# Patient Record
Sex: MALE | Race: WHITE | Employment: OTHER | URBAN - METROPOLITAN AREA
[De-identification: names, ages, dates, MRNs, and addresses within clinical notes are randomized per-mention and may not be internally consistent; named-entity substitution may affect disease eponyms.]

---

## 2017-01-04 ENCOUNTER — TELEPHONE (OUTPATIENT)
Dept: INTERVENTIONAL RADIOLOGY/VASCULAR | Facility: CLINIC | Age: 67
End: 2017-01-04

## 2017-01-05 ENCOUNTER — APPOINTMENT (OUTPATIENT)
Dept: INTERVENTIONAL RADIOLOGY/VASCULAR | Facility: CLINIC | Age: 67
End: 2017-01-05
Attending: RADIOLOGY
Payer: MEDICARE

## 2017-01-05 ENCOUNTER — APPOINTMENT (OUTPATIENT)
Dept: MEDSURG UNIT | Facility: CLINIC | Age: 67
End: 2017-01-05
Attending: RADIOLOGY
Payer: MEDICARE

## 2017-01-05 PROCEDURE — C1887 CATHETER, GUIDING: HCPCS

## 2017-01-05 PROCEDURE — C1874 STENT, COATED/COV W/DEL SYS: HCPCS

## 2017-01-05 PROCEDURE — 75710 ARTERY X-RAYS ARM/LEG: CPT | Mod: LT

## 2017-01-05 PROCEDURE — 37221 ZZHC REVASC ILIAC ART, ANGIO/STENT, INIT VESSEL: CPT

## 2017-01-05 PROCEDURE — C1769 GUIDE WIRE: HCPCS

## 2017-01-05 PROCEDURE — C1725 CATH, TRANSLUMIN NON-LASER: HCPCS

## 2017-01-16 ENCOUNTER — TRANSFERRED RECORDS (OUTPATIENT)
Dept: HEALTH INFORMATION MANAGEMENT | Facility: CLINIC | Age: 67
End: 2017-01-16

## 2017-01-17 ENCOUNTER — TELEPHONE (OUTPATIENT)
Dept: INTERVENTIONAL RADIOLOGY/VASCULAR | Facility: CLINIC | Age: 67
End: 2017-01-17

## 2017-01-17 ENCOUNTER — TELEPHONE (OUTPATIENT)
Dept: FAMILY MEDICINE | Facility: CLINIC | Age: 67
End: 2017-01-17

## 2017-01-17 DIAGNOSIS — I70.213 ATHEROSCLEROSIS OF BOTH LOWER EXTREMITIES WITH INTERMITTENT CLAUDICATION (H): ICD-10-CM

## 2017-01-17 DIAGNOSIS — I73.9 PAD (PERIPHERAL ARTERY DISEASE) (H): Primary | ICD-10-CM

## 2017-01-17 NOTE — TELEPHONE ENCOUNTER
I called to get patient scheduled for 1 month post stent placement bilateral common and external iliac arteries imaging and follow up visit with Dr. Hampton.  Pt confirms that he is available 2/1/17 and is scheduled as follows ( a letter was sent with details).  Appointment Reminder:      Your ultrasounds have been scheduled for Wednesday, February 1st @ 12:30 pm.     Please arrive 15 minutes early.     Allow 90 minutes for this exam.    Nothing to eat for 8 hours prior to imaging.    To check in for this test go to the [facility] building located at [address] [place] [address]. Imaging will be completed on the first floor.    Your clinic appointment with Dr. Hampton will be on Wednesday, February 1st @ 2:00 pm, at the [facility], Vascular/Derm Clinic 3F.    DEISY Day, RN, BSN  Interventional Radiology Care Coordinator   Phone:  [phone]

## 2017-01-17 NOTE — Clinical Note
January 17, 2017    Joseph Cardona  [address] [address] [facility] [address]    Dear Joseph,     Appointment Reminder:      Your ultrasounds have been scheduled for Wednesday, February 1st @ 12:30 pm.     Please arrive 15 minutes early.     Allow 90 minutes for this exam.    Nothing to eat for 8 hours prior to imaging.    To check in for this test go to the [facility] building located at [address] [place] [address]. Imaging will be completed on the first floor.    Your clinic appointment with Dr. Hampton will be on Wednesday, February 1st @ 2:00 pm, at the [facility], Vascular/Derm Clinic 3F.    DEISY Day, RN, BSN  Interventional Radiology Care Coordinator   Phone:  [phone]

## 2017-02-01 ENCOUNTER — OFFICE VISIT (OUTPATIENT)
Dept: RADIOLOGY | Facility: CLINIC | Age: 67
End: 2017-02-01

## 2017-02-01 VITALS — DIASTOLIC BLOOD PRESSURE: 70 MMHG | HEART RATE: 77 BPM | SYSTOLIC BLOOD PRESSURE: 149 MMHG

## 2017-02-01 DIAGNOSIS — I70.213 ATHEROSCLEROSIS OF NATIVE ARTERY OF BOTH LOWER EXTREMITIES WITH INTERMITTENT CLAUDICATION (H): Primary | ICD-10-CM

## 2017-02-01 ASSESSMENT — PAIN SCALES - GENERAL: PAINLEVEL: NO PAIN (0)

## 2017-02-01 NOTE — Clinical Note
2/1/2017       RE: Joseph Cardona  [address] [address] [facility] [place] [address]     Dear Colleague,    Thank you for referring your patient, Joseph Cardona, to the [facility] VASCULAR [facility]INIC at [facility]. Please see a copy of my visit note below.        INTERVENTIONAL RADIOLOGY CONSULTATION    Name: Joseph Cardona  Age: 66 year old   Referring Physician: Dr. López   REASON FOR Followup: Post procedure followup    HPI:   Mr Cardona is a 66 year old gentleman, status placement of overlapping bare metal stents in the right iliac arteries (8 mm Zilver bare metal stents in the right common iliac artery and right external iliac artery) and overlapping stent grafts in the left iliac arteries (10 mm iCast stent graft in left common iliac artery, and 8 mm Viabhan stent graft in the left external iliac artery) on 1/5/2017 for bilateral lower extremity claudication. Clinically, he reports significant improvement in his claudication symptoms. He has been able to walk without life limiting claudications and he is very happy with the results of the procedure. He denies any new leg ulcers, palpable mass or hematoma in the groin access site, fever, chills, or any other medical issues. He has been compliant with his antiplatelet medications (ASA and Clopidegrol).         PAST MEDICAL HISTORY:   Past Medical History   Diagnosis Date     Heart disease      Hypertension        PAST SURGICAL HISTORY:   Past Surgical History   Procedure Laterality Date     Eye surgery       cataract surgery     Cardiac surgery       Bypass double and quadruple     Head & neck surgery       Caratid surgery       FAMILY HISTORY:   No family history on file.    SOCIAL HISTORY:   Social History   Substance Use Topics     Smoking status: Former Smoker     Smokeless tobacco: Never Used      Comment: quit 2 years ago     Alcohol Use: 0.0 oz/week     0 Standard drinks or equivalent per week       PROBLEM LIST:    Patient Active Problem List    Diagnosis Date Noted     Intermittent claudication (H) 11/09/2016     Priority: Medium     Post herpetic neuralgia 11/09/2016     Priority: Medium     Positive hepatitis C antibody test 06/10/2016     Priority: Medium     Coronary artery disease involving native coronary artery of native heart without angina pectoris 06/09/2016     Priority: Medium     COPD, mild (H) 02/09/2016     Priority: Medium     Tinea pedis of left foot 02/09/2016     Priority: Medium     Hyperlipidemia LDL goal <70 08/14/2015     Priority: Medium     Problem list name updated by automated process. Provider to review       Essential hypertension 08/14/2015     Priority: Medium     Tobacco dependence syndrome 08/14/2015     Priority: Medium     Paroxysmal atrial fibrillation (H) 08/14/2015     Priority: Medium     Advanced directives, counseling/discussion 11/17/2015     Patient dont have HCD, declined to discuss at this time  Funmi King MA           MEDICATIONS:   Prescription Medications as of 2/1/2017             gabapentin (NEURONTIN) 300 MG capsule TAKE 2 CAPSULES (600 MG) BY MOUTH 3 TIMES DAILY    albuterol (VENTOLIN HFA) 108 (90 BASE) MCG/ACT inhaler Inhale 2 puffs into the lungs every 4 hours as needed for shortness of breath / dyspnea or wheezing    losartan (COZAAR) 50 MG tablet Take 1 tablet (50 mg) by mouth daily    carvedilol (COREG) 6.25 MG tablet Take 1 tablet (6.25 mg) by mouth 2 times daily (with meals)    pantoprazole (PROTONIX) 20 MG tablet Take 1 capsule 30 minutes before breakfast.    zolpidem (AMBIEN) 5 MG tablet Take 1 tablet (5 mg) by mouth nightly as needed for sleep    tiotropium (SPIRIVA HANDIHALER) 18 MCG inhalation capsule Inhale 1 capsule (18 mcg) into the lungs daily    lidocaine (LIDODERM) 5 % patch Place 1 patch onto the skin every 24 hours    FUROSEMIDE PO Take 20 mg by mouth daily     nitroglycerin (NITROSTAT) 0.4 MG SL tablet Place 1 tablet (0.4 mg) under the tongue every  5 minutes as needed for chest pain As needed for chest pain    aspirin 81 MG chewable tablet 81 mg daily    atorvastatin (LIPITOR) 40 MG tablet Take 40 mg by mouth daily    clopidogrel (PLAVIX) 75 MG tablet Take 75 mg by mouth daily          ALLERGIES:   Review of patient's allergies indicates no known allergies.    ROS:  Skin: negative  Cardiovascular: negative  Gastrointestinal: negative  Genitourinary: negative  Musculoskeletal: negative  Neurologic: negative  Psychiatric: negative      Physical Examination:   VITALS:   There were no vitals taken for this visit.  Constitutional: healthy, alert and no distress  Head: Normocephalic.   Musculoskeletal: extremities normal- no gross deformities noted  Skin: no suspicious lesions or rashes  Psychiatric: mentation appears normal and mentation appears normal.  Vascular: No bruits are noted. No hematoma or mass in the groin,.   DP  PT    Left  1/2 1/2    Right  2/2 2/2        Labs:    BMP RESULTS:  Lab Results   Component Value Date     11/08/2016    POTASSIUM 4.4 11/08/2016    CHLORIDE 107 11/08/2016    CO2 26 11/08/2016    ANIONGAP 8 11/08/2016    * 11/08/2016    BUN 22 11/08/2016    CR 0.98 01/05/2017    GFRESTIMATED 76 01/05/2017    GFRESTBLACK >90   GFR Calc   01/05/2017    MARIBEL 8.2* 11/08/2016        CBC RESULTS:  Lab Results   Component Value Date    WBC 7.6 01/05/2017    RBC 4.66 01/05/2017    HGB 14.4 01/05/2017    HCT 42.9 01/05/2017    MCV 92 01/05/2017    MCH 30.9 01/05/2017    MCHC 33.6 01/05/2017    RDW 12.2 01/05/2017     01/05/2017       INR/PTT:  Lab Results   Component Value Date    INR 1.29* 01/05/2017    PTT 31 01/05/2017       Diagnostic studies: ABIs and Doppler US of 2/1/2017 was reviewed,    Assessment/ Plan:  Mr Cardona is a 66 year old gentleman, status placement of overlapping bare metal stents in the right iliac arteries (8 mm Zilver bare metal stents in the right common iliac artery and right external  iliac artery) and overlapping stent grafts in the left iliac arteries (10 mm iCast stent graft in left common iliac artery, and 8 mm Viabhan stent graft in the left external iliac artery) on 1/5/2017 for bilateral lower extremity claudication. Clinically, he reports significant improvement in his claudication symptoms. On Doppler US today, the stents are patent. His ABIs has not significantly changed since before the procedure. This could be potentially due to improvement in the inflow of the bilateral lower extremity arteries. His left fem-pop occlusion is not symptomatic at this stage. For now, we would continue to monitor him with a repeat PIOTR and Doppler US of the pelvic arteries in 6 months to ascertain patency of the stents. From IR prospective, we would like him to continue Plavix for at least 3 months. He is concerned about driving in the rush hour and prefers to have his tests be done in [facility] clinic. We could arrange his tests to be done in [facility] and if there is any concerns about the patency of the stents or if the left fem-pop occlusive disease becomes symptomatic, we are happy to see him again in the [facility] IR clinic.    I saw and examined the patient with the fellow Dr. Jordan and agree with the assessment and plan. Clinical success after iliac stenting with resolution of lifestyle limiting claudication. Would recommend continued antiplatelet treatment with high dose ASA or plavix. Recommened surveillance at 6 months. Given clinical improvement no current need to attempt left SFA revascularization.     Total of approximately 25 minutes spent with the patient of which >50% spent on couselling.    Sincerely,    Bo Hampton MD and Viri Jordan MD  Interventional Radiology Clinic  [facility]    CC  Patient Care Team:  Kyle López MD as PCP - General (Internal Medicine)

## 2017-02-01 NOTE — Clinical Note
February 2, 2017    Joseph Cardona  [address] [address] [facility] [address]    Dear Joseph,     Appointment Reminder:      Your 6 month interval PIOTR and iliac duplex ultrasounds have been scheduled for Monday, August 7th starting at 8:00 am at [place].   Located at [address] [place] [id number]    Nothing to eat 8 hours prior to imaging    No caffiene or tobacco 1 hour prior to imaging    Please arrive 15 minutes early.     Allow 80 minutes for these exams.    My plan is to have Dr. Hampton review your imaging and call you with the results.      Please feel free to call me with any questions or concerns as this date approaches.    Sincerely,    Cheri Day RN, BSN  Interventional Radiology Care Coordinator  Office: [phone]

## 2017-02-01 NOTE — NURSING NOTE
"Chief Complaint   Patient presents with     Vascular Disease     Follow up for posterior iliac artery stent, \"things are going pretty well.\"       Filed Vitals:    02/01/17 1400   BP: 149/70   Pulse: 77       There is no weight on file to calculate BMI.                              Irma Paulino, [facility]    "

## 2017-02-01 NOTE — PROGRESS NOTES
INTERVENTIONAL RADIOLOGY CONSULTATION    Name: Joseph Cardona  Age: 66 year old   Referring Physician: Dr. López   REASON FOR Followup: Post procedure followup    HPI:   Mr Cardona is a 66 year old gentleman, status placement of overlapping bare metal stents in the right iliac arteries (8 mm Zilver bare metal stents in the right common iliac artery and right external iliac artery) and overlapping stent grafts in the left iliac arteries (10 mm iCast stent graft in left common iliac artery, and 8 mm Viabhan stent graft in the left external iliac artery) on 1/5/2017 for bilateral lower extremity claudication. Clinically, he reports significant improvement in his claudication symptoms. He has been able to walk without life limiting claudications and he is very happy with the results of the procedure. He denies any new leg ulcers, palpable mass or hematoma in the groin access site, fever, chills, or any other medical issues. He has been compliant with his antiplatelet medications (ASA and Clopidegrol).         PAST MEDICAL HISTORY:   Past Medical History   Diagnosis Date     Heart disease      Hypertension        PAST SURGICAL HISTORY:   Past Surgical History   Procedure Laterality Date     Eye surgery       cataract surgery     Cardiac surgery       Bypass double and quadruple     Head & neck surgery       Caratid surgery       FAMILY HISTORY:   No family history on file.    SOCIAL HISTORY:   Social History   Substance Use Topics     Smoking status: Former Smoker     Smokeless tobacco: Never Used      Comment: quit 2 years ago     Alcohol Use: 0.0 oz/week     0 Standard drinks or equivalent per week       PROBLEM LIST:   Patient Active Problem List    Diagnosis Date Noted     Intermittent claudication (H) 11/09/2016     Priority: Medium     Post herpetic neuralgia 11/09/2016     Priority: Medium     Positive hepatitis C antibody test 06/10/2016     Priority: Medium     Coronary artery disease involving native  coronary artery of native heart without angina pectoris 06/09/2016     Priority: Medium     COPD, mild (H) 02/09/2016     Priority: Medium     Tinea pedis of left foot 02/09/2016     Priority: Medium     Hyperlipidemia LDL goal <70 08/14/2015     Priority: Medium     Problem list name updated by automated process. Provider to review       Essential hypertension 08/14/2015     Priority: Medium     Tobacco dependence syndrome 08/14/2015     Priority: Medium     Paroxysmal atrial fibrillation (H) 08/14/2015     Priority: Medium     Advanced directives, counseling/discussion 11/17/2015     Patient dont have HCD, declined to discuss at this time  Funmi King MA           MEDICATIONS:   Prescription Medications as of 2/1/2017             gabapentin (NEURONTIN) 300 MG capsule TAKE 2 CAPSULES (600 MG) BY MOUTH 3 TIMES DAILY    albuterol (VENTOLIN HFA) 108 (90 BASE) MCG/ACT inhaler Inhale 2 puffs into the lungs every 4 hours as needed for shortness of breath / dyspnea or wheezing    losartan (COZAAR) 50 MG tablet Take 1 tablet (50 mg) by mouth daily    carvedilol (COREG) 6.25 MG tablet Take 1 tablet (6.25 mg) by mouth 2 times daily (with meals)    pantoprazole (PROTONIX) 20 MG tablet Take 1 capsule 30 minutes before breakfast.    zolpidem (AMBIEN) 5 MG tablet Take 1 tablet (5 mg) by mouth nightly as needed for sleep    tiotropium (SPIRIVA HANDIHALER) 18 MCG inhalation capsule Inhale 1 capsule (18 mcg) into the lungs daily    lidocaine (LIDODERM) 5 % patch Place 1 patch onto the skin every 24 hours    FUROSEMIDE PO Take 20 mg by mouth daily     nitroglycerin (NITROSTAT) 0.4 MG SL tablet Place 1 tablet (0.4 mg) under the tongue every 5 minutes as needed for chest pain As needed for chest pain    aspirin 81 MG chewable tablet 81 mg daily    atorvastatin (LIPITOR) 40 MG tablet Take 40 mg by mouth daily    clopidogrel (PLAVIX) 75 MG tablet Take 75 mg by mouth daily          ALLERGIES:   Review of patient's allergies indicates  no known allergies.    ROS:  Skin: negative  Cardiovascular: negative  Gastrointestinal: negative  Genitourinary: negative  Musculoskeletal: negative  Neurologic: negative  Psychiatric: negative      Physical Examination:   VITALS:   There were no vitals taken for this visit.  Constitutional: healthy, alert and no distress  Head: Normocephalic.   Musculoskeletal: extremities normal- no gross deformities noted  Skin: no suspicious lesions or rashes  Psychiatric: mentation appears normal and mentation appears normal.  Vascular: No bruits are noted. No hematoma or mass in the groin,.   DP  PT    Left  1/2  1/2    Right  2/2  2/2        Labs:    BMP RESULTS:  Lab Results   Component Value Date     11/08/2016    POTASSIUM 4.4 11/08/2016    CHLORIDE 107 11/08/2016    CO2 26 11/08/2016    ANIONGAP 8 11/08/2016    * 11/08/2016    BUN 22 11/08/2016    CR 0.98 01/05/2017    GFRESTIMATED 76 01/05/2017    GFRESTBLACK >90   GFR Calc   01/05/2017    MARIBEL 8.2* 11/08/2016        CBC RESULTS:  Lab Results   Component Value Date    WBC 7.6 01/05/2017    RBC 4.66 01/05/2017    HGB 14.4 01/05/2017    HCT 42.9 01/05/2017    MCV 92 01/05/2017    MCH 30.9 01/05/2017    MCHC 33.6 01/05/2017    RDW 12.2 01/05/2017     01/05/2017       INR/PTT:  Lab Results   Component Value Date    INR 1.29* 01/05/2017    PTT 31 01/05/2017       Diagnostic studies: ABIs and Doppler US of 2/1/2017 was reviewed,    Assessment/ Plan:  Mr Cardona is a 66 year old gentleman, status placement of overlapping bare metal stents in the right iliac arteries (8 mm Zilver bare metal stents in the right common iliac artery and right external iliac artery) and overlapping stent grafts in the left iliac arteries (10 mm iCast stent graft in left common iliac artery, and 8 mm Viabhan stent graft in the left external iliac artery) on 1/5/2017 for bilateral lower extremity claudication. Clinically, he reports significant improvement in his  claudication symptoms. On Doppler US today, the stents are patent. His ABIs has not significantly changed since before the procedure. This could be potentially due to improvement in the inflow of the bilateral lower extremity arteries. His left fem-pop occlusion is not symptomatic at this stage. For now, we would continue to monitor him with a repeat PIOTR and Doppler US of the pelvic arteries in 6 months to ascertain patency of the stents. From IR prospective, we would like him to continue Plavix for at least 3 months. He is concerned about driving in the rush hour and prefers to have his tests be done in [facility] clinic. We could arrange his tests to be done in [facility] and if there is any concerns about the patency of the stents or if the left fem-pop occlusive disease becomes symptomatic, we are happy to see him again in the [facility] IR clinic.    I saw and examined the patient with the fellow Dr. Jordan and agree with the assessment and plan. Clinical success after iliac stenting with resolution of lifestyle limiting claudication. Would recommend continued antiplatelet treatment with high dose ASA or plavix. Recommened surveillance at 6 months. Given clinical improvement no current need to attempt left SFA revascularization.     Total of approximately 25 minutes spent with the patient of which >50% spent on couselling.    Sincerely,    Bo Hampton MD and Viri Jordan MD  Interventional Radiology Clinic  [facility]    CC  Patient Care Team:  Erik López MD as PCP - General (Internal Medicine)  Erik López MD as MD (Internal Medicine)  Bo Hampton MD as Resident (Radiology)  ERIK LÓPEZ

## 2017-04-05 DIAGNOSIS — K21.9 GASTROESOPHAGEAL REFLUX DISEASE, ESOPHAGITIS PRESENCE NOT SPECIFIED: ICD-10-CM

## 2017-04-05 NOTE — TELEPHONE ENCOUNTER
pantoprazole (PROTONIX) 20 MG tablet      Last Written Prescription Date: 10/07/16  Last Fill Quantity: 90,  # refills: 1   Last Office Visit with [facility], [facility] or [facility] prescribing provider: 11/08/16      Sana Bejarano Radiology

## 2017-04-06 RX ORDER — PANTOPRAZOLE SODIUM 20 MG/1
TABLET, DELAYED RELEASE ORAL
Qty: 90 TABLET | Refills: 1 | Status: SHIPPED | OUTPATIENT
Start: 2017-04-06 | End: 2018-02-02

## 2017-04-06 NOTE — TELEPHONE ENCOUNTER
Prescription approved per [facility] Refill Protocol.    Jacquelyn Finn RN, [facility]

## 2017-04-07 DIAGNOSIS — G47.00 PERSISTENT INSOMNIA: ICD-10-CM

## 2017-04-07 NOTE — TELEPHONE ENCOUNTER
Ambien      Last Written Prescription Date: 09/15/16  Last Fill Quantity: 30,  # refills: 5   Last Office Visit with [facility], [facility] or [facility] prescribing provider: 11/08/16                                             Routing refill request to provider for review/approval because:  Drug not on the [facility] refill protocol   Kristen Olsen RN

## 2017-04-10 RX ORDER — ZOLPIDEM TARTRATE 5 MG/1
TABLET ORAL
Qty: 30 TABLET | Refills: 5 | Status: SHIPPED | OUTPATIENT
Start: 2017-04-10 | End: 2017-10-04

## 2017-04-27 ENCOUNTER — TELEPHONE (OUTPATIENT)
Dept: FAMILY MEDICINE | Facility: CLINIC | Age: 67
End: 2017-04-27

## 2017-04-27 NOTE — TELEPHONE ENCOUNTER
Panel Management Review          Fail List measure:       Patient is due/failing the following:   COLONOSCOPY    Action needed:   none    Type of outreach:    Sent letter.    Questions for provider review:    None                                                                                                                                    Tiffanie Champion MA

## 2017-04-27 NOTE — LETTER
[address] [address]  [phone]  Dept: [phone]      April 27, 2017      Joseph Cardona  [address] [facility]  [place] [address]    Dear Joseph Cardona,     At [facility] we care about your health and are committed to providing quality patient care.    Which includes staying current on preventive cancer screenings.  You can increase your chances of finding and treating cancers through regular screenings.      Our records indicate you may be due for the following preventive screening(s):    Colonoscopy    Colonoscopy is recommended every ten years for everyone age 50 and older. Please take a moment to read over the enclosed information packet about colon cancer screening. We strongly urge our patient's to consider having a colonoscopy done, which is the best screening test available and only needs to be done every 10 years if normal. If you are unwilling or unable to have a colonoscopy then we recommend the annual stool testing for blood. This test is called a FIT test and it looks for blood in the stool.     To schedule an appointment for your colonoscopy, please see the attached referral.     To schedule an appointment or discuss this screening further, you may contact us by phone at the [facility] at [phone] or online through the patient [email or web address]    If you have had any of the screenings listed above at another facility, please call us so that we may update your chart.      Your partners in health,      Quality Committee at [facility]

## 2017-05-08 ENCOUNTER — OFFICE VISIT (OUTPATIENT)
Dept: PHARMACY | Facility: CLINIC | Age: 67
End: 2017-05-08
Payer: COMMERCIAL

## 2017-05-08 DIAGNOSIS — J44.9 COPD, MILD (H): ICD-10-CM

## 2017-05-08 DIAGNOSIS — E78.5 HYPERLIPIDEMIA LDL GOAL <70: ICD-10-CM

## 2017-05-08 DIAGNOSIS — I10 ESSENTIAL HYPERTENSION: Primary | ICD-10-CM

## 2017-05-08 DIAGNOSIS — K21.9 GASTROESOPHAGEAL REFLUX DISEASE WITHOUT ESOPHAGITIS: ICD-10-CM

## 2017-05-08 DIAGNOSIS — G47.00 INSOMNIA, UNSPECIFIED TYPE: ICD-10-CM

## 2017-05-08 DIAGNOSIS — I25.810 ATHEROSCLEROSIS OF CORONARY ARTERY BYPASS GRAFT OF NATIVE HEART WITHOUT ANGINA PECTORIS: ICD-10-CM

## 2017-05-08 PROCEDURE — 99607 MTMS BY PHARM ADDL 15 MIN: CPT | Performed by: PHARMACIST

## 2017-05-08 PROCEDURE — 99605 MTMS BY PHARM NP 15 MIN: CPT | Performed by: PHARMACIST

## 2017-05-08 NOTE — PROGRESS NOTES
SUBJECTIVE/OBJECTIVE:                                                    Joseph Cardona is a 66 year old male coming in for a follow-up visit for Medication Therapy Management.  He was referred to me from  health insurance plan.     Chief Complaint: Follow-up from our appointment on 3/7/16. General medication review.    Allergies/ADRs: Reviewed in Epic  Tobacco: History of tobacco dependence - quit August 2015   Alcohol: not currently using  Caffeine: 1 cups/day of coffee  Activity: walks every day - couple blocks  PMH: Reviewed in Epic    Medication Adherence: no issues reported by patient    H/o MI/HTN: Currently taking losartan 50mg daily, carvedilol 6.25mg BID, furosemide 20mg daily, ASA 81mg daily and Plavix 75mg daily. Also has nitrostat 0.4mg SL on-hand for chest pain, though he hasn't needed to every use it (bottle is in-date). Denies abnormal bruising/bleeding or other side effects. [place]. Denies lightheadedness or orthostatic hypotension. Is complaining of significant urination with furosemide.    Hyperlipidemia: Current therapy includes atorvastatin 40mg once daily.  Pt reports no significant myalgias or other side effects.     COPD: Current medications: Tiotropium (Spiriva)  once daily and Albuterol used rarely  Pt is not experiencing side effects.   Pt reports the following symptoms: none.  Pt does not have an COPD Action Plan on file.   Has spirometry been completed: Yes     Insomnia: Current medications include: zolpidem 5mg 3-4 times per week. Pt reports trouble falling asleep. Denies sleep walking or sleep eating; also denies daytime grogginess.    GERD: Current medications include: Protonix (pantoprazole) 20mg daily. The patient does notice symptoms if they miss a dose. Patient feels that current regimen is effective, however is willing to decrease to avoid long term side effects.     Current labs include:  BP Readings from Last 3 Encounters:   05/08/17  136/50   02/01/17 149/70   01/05/17 137/54     Lab Results   Component Value Date    A1C 5.4 07/14/2016    A1C 5.6 02/09/2016     Recent Labs   Lab Test  11/08/16   1139 10/26/15  01/12/10   0757   CHOL  124   --   118   HDL  36*   --   33*   LDL  78  72  71   TRIG  50   --   68   CHOLHDLRATIO   --    --   3.6       Lab Results   Component Value Date    MICROL <5 07/14/2016     Last Basic Metabolic Panel:  Lab Results   Component Value Date     11/08/2016      Lab Results   Component Value Date    POTASSIUM 4.4 11/08/2016     Lab Results   Component Value Date    CHLORIDE 107 11/08/2016     Lab Results   Component Value Date    MARIBEL 8.2 11/08/2016     Lab Results   Component Value Date    CO2 26 11/08/2016     Lab Results   Component Value Date    BUN 22 11/08/2016     Lab Results   Component Value Date    CR 0.98 01/05/2017     Lab Results   Component Value Date     11/08/2016       GFR Estimate   Date Value Ref Range Status   01/05/2017 76 >60 mL/min/1.7m2 Final     Comment:     Non  GFR Calc   11/08/2016 77 >60 mL/min/1.7m2 Final     Comment:     Non  GFR Calc   02/09/2016 63 >60 mL/min/1.7m2 Final     Comment:     Non  GFR Calc     TSH   Date Value Ref Range Status   11/17/2015 4.71 (H) 0.40 - 4.00 mU/L Final   ]  T4 Free   Date Value Ref Range Status   11/17/2015 1.10 0.76 - 1.46 ng/dL Final   ]    Wt Readings from Last 2 Encounters:   01/05/17 180 lb (81.6 kg)   11/08/16 191 lb (86.6 kg)       Most Recent Immunizations   Administered Date(s) Administered     Influenza (High Dose) 3 valent vaccine 10/01/2016     Pneumococcal (PCV 13) 11/08/2016     Pneumococcal 23 valent 11/17/2015     TDAP Vaccine (Adacel) 11/17/2015     ASSESSMENT:                                                       Current medications were reviewed with him today. Medicare Part D topics discussed:Recommendations to doctor and Medication changes      Medication Adherence: no issues  identified    H/o MI/HTN: Consider discontinuation of furosemide, as he has been having frequent urination. There is room to increase the other BP medications if needed.     Hyperlipidemia: Stable. Pt is on high intensity statin which is indicated based on 2013 ACC/AHA guidelines for lipid management.      COPD: Stable.     Insomnia: Stable.    GERD: Would be beneficial to taper off PPI and start a PRN H2 blocker due to the potential for long-term side effects. Pt open to this.     PLAN:                                                      1. Take Protonix 20mg every other day until July, then discontinue. Start Zantac 150mg PRN BID for breakthrough heartburn.   2. Consider discontinuation of furosemide, can increase other BP medications if needed for BP.     I spent 30 minutes with this patient today. Extra 15 minutes spent creating a MAP. All changes were made via collaborative practice agreement with Kyle López. A copy of the visit note was provided to the patient's primary care provider.    Will follow up by phone in 1 month.    The patient was given a summary of these recommendations as an after visit summary.     Janell Harris, PharmD  Medication Therapy Management Pharmacist  Pager: [phone]

## 2017-05-08 NOTE — PATIENT INSTRUCTIONS
Recommendations from today's MTM visit:                                                      1. Cut back your pantoprazole (Protonix) to every other day until July, then stop. Start Zantac 150mg as needed (up to twice a day) for heartburn.     2. Talk to Dr. López about stopping the furosemide and increasing one of your other medications for your blood pressure.     Next MTM visit: 1 year, I'll call you in about a month to check in on your heartburn.     To schedule another MTM appointment, please call the clinic directly or you may call the MTM scheduling line at [phone] or toll-free at [phone].     My Clinical Pharmacist's contact information:                                                      It was a pleasure seeing you today!  Please feel free to contact me with any questions or concerns you have.      Janell Woodson, PharmD  Medication Therapy Management Pharmacist  Pager: [phone]    You may receive a survey about the MTM services you received.  I would appreciate your feedback to help me serve you better in the future. Please fill it out and return it when you can. Your comments will be anonymous.

## 2017-05-08 NOTE — MR AVS SNAPSHOT
After Visit Summary   5/8/2017    Joseph Cardona    MRN: [id number]           Patient Information     Date Of Birth          1950        Visit Information        Provider Department      5/8/2017 10:30 AM Janell Woodson [facility]        Care Instructions    Recommendations from today's MTM visit:                                                      1. Cut back your pantoprazole (Protonix) to every other day until July, then stop. Start Zantac 150mg as needed (up to twice a day) for heartburn.     2. Talk to Dr. López about stopping the furosemide and increasing one of your other medications for your blood pressure.     Next MTM visit: 1 year, I'll call you in about a month to check in on your heartburn.     To schedule another MT appointment, please call the clinic directly or you may call the MTM scheduling line at [phone] or toll-free at [phone].     My Clinical Pharmacist's contact information:                                                      It was a pleasure seeing you today!  Please feel free to contact me with any questions or concerns you have.      Janell Woodson, PharmD  Medication Therapy Management Pharmacist  Pager: [phone]    You may receive a survey about the MTM services you received.  I would appreciate your feedback to help me serve you better in the future. Please fill it out and return it when you can. Your comments will be anonymous.                Follow-ups after your visit        Your next 10 appointments already scheduled     Aug 07, 2017  8:00 AM CDT   US AORTA/IVC/ILIAC DUPLEX COMPLETE with MGUS1   [facility] ([facility])    [address] [address] [place] [id number] [phone]           Please bring a list of your medicines (including vitamins, minerals and over-the-counter drugs). Also, tell your doctor about any allergies you may have. Wear comfortable clothes  and leave your valuables at home.  Adults: No eating or drinking for 8 hours before the exam. You may take medicine with a small sip of water.  Children: - Children 6+ years: No food or drink for 6 hours before exam. - Children 1-5 years: No food or drink for 4 hours before exam. - Infants, breast-fed: may have breast milk up to 2 hours before exam. - Infants, formula: may have bottle until 4 hours before exam.  Please call the Imaging Department at your exam site with any questions.            Aug 07, 2017  8:40 AM CDT   US PIOTR DOPPLER NO EXERCISE with MGUS1, MG US TECH   [facility]    [address] [address] [place] [id number] [phone]           Please bring a list of your medicines (including vitamins, minerals and over-the-counter drugs). Also, tell your doctor about any allergies you may have. Wear comfortable clothes and leave your valuables at home.  No caffeine or tobacco for 1 hour prior to exam.  Please call the Imaging Department at your exam site with any questions.              Who to contact     If you have questions or need follow up information about today's clinic visit or your schedule please contact [facility] directly at [phone].  Normal or non-critical lab and imaging results will be communicated to you by [employer], letter or phone within 4 business days after the clinic has received the results. If you do not hear from us within 7 days, please contact the clinic through [employer] or phone. If you have a critical or abnormal lab result, we will notify you by phone as soon as possible.  Submit refill requests through [employer] or call your pharmacy and they will forward the refill request to us. Please allow 3 business days for your refill to be completed.          Additional Information About Your Visit        [employer] Information     [employer] lets you send messages to your doctor, view your test results, renew your  "prescriptions, schedule appointments and more. To sign up, go to [email or web address] . Click on \"Log in\" on the left side of the screen, which will take you to the Welcome page. Then click on \"Sign up Now\" on the right side of the page.     You will be asked to enter the access code listed below, as well as some personal information. Please follow the directions to create your username and password.     Your access code is: [email or web address]  Expires: 2017 10:44 AM     Your access code will  in 90 days. If you need help or a new code, please call your [facility] or [phone].        Care EveryWhere ID     This is your Care EveryWhere ID. This could be used by other organizations to access your [facility] medical records  [id number]         Blood Pressure from Last 3 Encounters:   17 149/70   17 137/54   16 156/78    Weight from Last 3 Encounters:   17 180 lb (81.6 kg)   16 191 lb (86.6 kg)   16 180 lb (81.6 kg)              Today, you had the following     No orders found for display       Primary Care Provider Office Phone # Fax #    Kyle López [phone]       [facility] [address]  [facility] [address]        Thank you!     Thank you for choosing [facility]  for your care. Our goal is always to provide you with excellent care. Hearing back from our patients is one way we can continue to improve our services. Please take a few minutes to complete the written survey that you may receive in the mail after your visit with us. Thank you!             Your Updated Medication List - Protect others around you: Learn how to safely use, store and throw away your medicines at www.disposemymeds.org.          This list is accurate as of: 17 10:44 AM.  Always use your most recent med list.                   Brand Name Dispense Instructions for use    albuterol 108 (90 BASE) MCG/ACT Inhaler    " VENTOLIN HFA    1 Inhaler    Inhale 2 puffs into the lungs every 4 hours as needed for shortness of breath / dyspnea or wheezing       aspirin 81 MG chewable tablet      81 mg daily       atorvastatin 40 MG tablet    LIPITOR     Take 40 mg by mouth daily       carvedilol 6.25 MG tablet    COREG    180 tablet    Take 1 tablet (6.25 mg) by mouth 2 times daily (with meals)       clopidogrel 75 MG tablet    PLAVIX     Take 75 mg by mouth daily       FUROSEMIDE PO      Take 20 mg by mouth daily       lidocaine 5 % Patch    LIDODERM    30 patch    Place 1 patch onto the skin every 24 hours       losartan 50 MG tablet    COZAAR    90 tablet    Take 1 tablet (50 mg) by mouth daily       nitroglycerin 0.4 MG sublingual tablet    NITROSTAT    25 tablet    Place 1 tablet (0.4 mg) under the tongue every 5 minutes as needed for chest pain As needed for chest pain       pantoprazole 20 MG EC tablet    PROTONIX    90 tablet    TAKE 1 TABLET BY MOUTH DAILY 30 MINUTES BEFORE BREAKFAST.       tiotropium 18 MCG capsule    SPIRIVA HANDIHALER    30 capsule    Inhale 1 capsule (18 mcg) into the lungs daily       zolpidem 5 MG tablet    AMBIEN    30 tablet    TAKE 1 TABLET BY MOUTH NIGHTLY AS NEEDED FOR SLEEP.

## 2017-05-08 NOTE — LETTER
"     [facility] MTM     Date: 2017    Joseph Cardona  [address] [address] [address]    Dear Mr. Cardona,    Thank you for talking with me on 2017 about your health and medications. Medicare s MTM (Medication Therapy Management) program helps you understand your medications and use them safely.      This letter includes an action plan (Medication Action Plan) and medication list (Personal Medication List). The action plan has steps you should take to help you get the best results from your medications. The medication list will help you keep track of your medications and how to use them the right way.       Have your action plan and medication list with you when you talk with your doctors, pharmacists, and other healthcare providers in your care team.     Ask your doctors, pharmacists, and other healthcare providers to update the action plan and medication list at every visit.     Take your medication list with you if you go to the hospital or emergency room.     Give a copy of the action plan and medication list to your family or caregivers.     If you want to talk about this letter or any of the papers with it, please call   [phone].   We look forward to working with you, your doctors, and other healthcare providers to help you stay healthy.     Sincerely,    Janell Woodson      Enclosed: Medication Action Plan and Personal Medication List    MEDICATION ACTION PLAN FOR Joseph Cardona,  1950     This action plan will help you get the best results from your medications if you:   1. Read \"What we talked about.\"   2. Take the steps listed in the \"What I need to do\" boxes.   3. Fill in \"What I did and when I did it.\"   4. Fill in \"My follow-up plan\" and \"Questions I want to ask.\"     Have this action plan with you when you talk with your doctors, pharmacists, and other healthcare providers in your care team. Share this with your family or caregivers " too.  DATE PREPARED: 2017  What we talked about: Stopping pantoprazole due to concerns about long-term side effects.                                                   What I need to do: Take pantoprazole every other day until July, then stop it.        What I did and when I did it:                                              What we talked about: Starting Zantac (ranitidine) for breakthrough heartburn.                                                   What I need to do: Take ranitidine 150mg as needed (up to twice daily) for heartburn.        What I did and when I did it:                                               What we talked about: Potentially stopping furosemide.                                                   What I need to do: Talk to Dr. López about stopping this.        What I did and when I did it:                                                 My follow-up plan:                 Questions I want to ask:              If you have any questions about your action plan, call Janell Woodson, Phone: [phone] , Monday-Friday 8-4:30pm.           MEDICATION LIST FOR Joseph Cardona,  1950     This medication list was made for you after we talked. We also used information from your doctor's chart.      Use blank rows to add new medications. Then fill in the dates you started using them.    Cross out medications when you no longer use them. Then write the date and why you stopped using them.    Ask your doctors, pharmacists, and other healthcare providers to update this list at every visit. Keep this list up-to-date with:       Prescription medications    Over the counter drugs     Herbals    Vitamins    Minerals      If you go to the hospital or emergency room, take this list with you. Share this with your family or caregivers too.     DATE PREPARED: 2017  Allergies or side effects: Review of patient's allergies indicates no known allergies.     Medication:  ALBUTEROL SULFATE   (90 BASE) MCG/ACT IN AERS      How I use it:  Inhale 2 puffs into the lungs every 4 hours as needed for shortness of breath / dyspnea or wheezing      Why I use it: COPD, mild (H)    Prescriber:  Kyle López MD      Date I started using it:       Date I stopped using it:         Why I stopped using it:            Medication:  ASPIRIN 81 MG PO CHEW      How I use it:  81 mg daily      Why I use it:  history of heart attack    Prescriber:  Patient Reported      Date I started using it:       Date I stopped using it:         Why I stopped using it:            Medication:  ATORVASTATIN CALCIUM 40 MG PO TABS      How I use it:  Take 40 mg by mouth daily      Why I use it:  high cholesterol    Prescriber:  Patient Reported      Date I started using it:       Date I stopped using it:         Why I stopped using it:            Medication:  CARVEDILOL 6.25 MG PO TABS      How I use it:  Take 1 tablet (6.25 mg) by mouth 2 times daily (with meals)      Why I use it:  Essential hypertension with goal blood pressure less than 140/90    Prescriber:  Kyle López MD      Date I started using it:       Date I stopped using it:         Why I stopped using it:            Medication:  CLOPIDOGREL BISULFATE 75 MG PO TABS      How I use it:  Take 75 mg by mouth daily      Why I use it:  history of heart attack    Prescriber:  Patient Reported      Date I started using it:       Date I stopped using it:         Why I stopped using it:            Medication:  FUROSEMIDE PO      How I use it:  Take 20 mg by mouth daily       Why I use it:  blood pressure    Prescriber:  Patient Reported      Date I started using it:       Date I stopped using it:         Why I stopped using it:            Medication:  LOSARTAN POTASSIUM 50 MG PO TABS      How I use it:  Take 1 tablet (50 mg) by mouth daily      Why I use it: Essential hypertension with goal blood pressure less than 140/90    Prescriber:  Kyle López MD      Date I  started using it:       Date I stopped using it:         Why I stopped using it:            Medication:  NITROGLYCERIN 0.4 MG SL SUBL      How I use it:  Place 1 tablet (0.4 mg) under the tongue every 5 minutes as needed for chest pain As needed for chest pain      Why I use it: Coronary artery disease involving native coronary artery without angina pectoris, unspecified whether native or transplanted heart    Prescriber:  Kyle López MD      Date I started using it:       Date I stopped using it:         Why I stopped using it:            Medication:  PANTOPRAZOLE SODIUM 20 MG PO TBEC      How I use it:  TAKE 1 TABLET BY MOUTH DAILY 30 MINUTES BEFORE BREAKFAST.      Why I use it: Gastroesophageal reflux disease, esophagitis presence not specified    Prescriber:  Kyle López MD      Date I started using it:       Date I stopped using it:         Why I stopped using it:            Medication:  RANITIDINE  MG PO TABS      How I use it:  Take 1 tablet (150 mg) by mouth 2 times daily as needed for heartburn      Why I use it: Gastroesophageal reflux disease without esophagitis    Prescriber:  Kyle López MD      Date I started using it:       Date I stopped using it:         Why I stopped using it:            Medication:  TIOTROPIUM BROMIDE MONOHYDRATE 18 MCG IN CAPS      How I use it:  Inhale 1 capsule (18 mcg) into the lungs daily      Why I use it: Mild chronic obstructive pulmonary disease (H)    Prescriber:  Kyle López MD      Date I started using it:       Date I stopped using it:         Why I stopped using it:            Medication:  ZOLPIDEM TARTRATE 5 MG PO TABS      How I use it:  TAKE 1 TABLET BY MOUTH NIGHTLY AS NEEDED FOR SLEEP.      Why I use it: Persistent insomnia    Prescriber:  Kyle López MD      Date I started using it:       Date I stopped using it:         Why I stopped using it:            Medication:         How I use it:         Why I use it:       Prescriber:         Date I started using it:       Date I stopped using it:         Why I stopped using it:            Medication:         How I use it:         Why I use it:      Prescriber:         Date I started using it:       Date I stopped using it:         Why I stopped using it:            Medication:         How I use it:         Why I use it:      Prescriber:         Date I started using it:       Date I stopped using it:         Why I stopped using it:              Other Information:     If you have any questions about your action plan, call [phone].    According to the Paperwork Reduction Act of 1995, no persons are required to respond to a collection of information unless it displays a valid OMB control number. The valid OMB number for this information collection is 4069-7926. The time required to complete this information collection is estimated to average 40 minutes per response, including the time to review instructions, searching existing data resources, gather the data needed, and complete and review the information collection. If you have any comments concerning the accuracy of the time estimate(s) or suggestions for improving this form, please write to: CMS, Attn: CHANDAN Reports Clearance Officer, [address] [place] [phone].

## 2017-05-08 NOTE — Clinical Note
Blayne López,   I met with Joseph this week. He's having excessive urination with furosemide and would like to stop it. He's due for a visit with you, so I said you might want to talk with him about this at your appointment, otherwise I can call him.   Thanks, Janell Woodson, PharmD Medication Therapy Management Pharmacist Pager: [phone]

## 2017-05-11 VITALS — DIASTOLIC BLOOD PRESSURE: 50 MMHG | SYSTOLIC BLOOD PRESSURE: 136 MMHG

## 2017-06-09 ENCOUNTER — TELEPHONE (OUTPATIENT)
Dept: PHARMACY | Facility: CLINIC | Age: 67
End: 2017-06-09

## 2017-06-09 NOTE — TELEPHONE ENCOUNTER
Patient was in to see MTM visit on 5/8/17. It was decided during the visit to discontinue the pantoprazole through a taper, while also starting ranitidine 150 mg by mouth twice daily as needed because of long term side effects of pantoprazole.    Today called patient to check in on how the taper is going, and how the ranitidine is going. Patient had started the taper, and said that he has been experiencing heartburn at night 3 days of the week. The patient has not started ranitidine yet because he was not sure if he could take the pantoprazole and ranitidine together. Suggested that he start ranitidine as needed and he could do non-pharm options such as raising the foot of the bed and avoiding spicy or greasy meals before bed.    Lisa Garzon D4 Student  Ryann PatleD  Medication Therapy Management Pharmacist  Pager: [phone]

## 2017-06-26 ENCOUNTER — RADIANT APPOINTMENT (OUTPATIENT)
Dept: GENERAL RADIOLOGY | Facility: CLINIC | Age: 67
End: 2017-06-26
Attending: INTERNAL MEDICINE
Payer: COMMERCIAL

## 2017-06-26 ENCOUNTER — OFFICE VISIT (OUTPATIENT)
Dept: FAMILY MEDICINE | Facility: CLINIC | Age: 67
End: 2017-06-26
Payer: COMMERCIAL

## 2017-06-26 VITALS
HEIGHT: 70 IN | OXYGEN SATURATION: 95 % | DIASTOLIC BLOOD PRESSURE: 70 MMHG | TEMPERATURE: 97.3 F | BODY MASS INDEX: 26.7 KG/M2 | WEIGHT: 186.5 LBS | HEART RATE: 76 BPM | SYSTOLIC BLOOD PRESSURE: 135 MMHG

## 2017-06-26 DIAGNOSIS — R73.03 PREDIABETES: ICD-10-CM

## 2017-06-26 DIAGNOSIS — E78.5 HYPERLIPIDEMIA LDL GOAL <70: ICD-10-CM

## 2017-06-26 DIAGNOSIS — I10 HYPERTENSION GOAL BP (BLOOD PRESSURE) < 140/90: ICD-10-CM

## 2017-06-26 DIAGNOSIS — M76.61 ACHILLES TENDINITIS OF RIGHT LOWER EXTREMITY: Primary | ICD-10-CM

## 2017-06-26 DIAGNOSIS — I73.9 PERIPHERAL VASCULAR DISEASE (H): ICD-10-CM

## 2017-06-26 PROBLEM — Z95.828 S/P INSERTION OF ILIAC ARTERY STENT: Status: ACTIVE | Noted: 2017-06-26

## 2017-06-26 LAB
ALBUMIN SERPL-MCNC: 3.7 G/DL (ref 3.4–5)
ALP SERPL-CCNC: 73 U/L (ref 40–150)
ALT SERPL W P-5'-P-CCNC: 15 U/L (ref 0–70)
ANION GAP SERPL CALCULATED.3IONS-SCNC: 5 MMOL/L (ref 3–14)
AST SERPL W P-5'-P-CCNC: 12 U/L (ref 0–45)
BASOPHILS # BLD AUTO: 0.1 10E9/L (ref 0–0.2)
BASOPHILS NFR BLD AUTO: 0.6 %
BILIRUB SERPL-MCNC: 1.1 MG/DL (ref 0.2–1.3)
BUN SERPL-MCNC: 23 MG/DL (ref 7–30)
CALCIUM SERPL-MCNC: 9 MG/DL (ref 8.5–10.1)
CHLORIDE SERPL-SCNC: 106 MMOL/L (ref 94–109)
CHOLEST SERPL-MCNC: 109 MG/DL
CO2 SERPL-SCNC: 28 MMOL/L (ref 20–32)
CREAT SERPL-MCNC: 1.03 MG/DL (ref 0.66–1.25)
DIFFERENTIAL METHOD BLD: NORMAL
EOSINOPHIL # BLD AUTO: 0.3 10E9/L (ref 0–0.7)
EOSINOPHIL NFR BLD AUTO: 3.4 %
ERYTHROCYTE [DISTWIDTH] IN BLOOD BY AUTOMATED COUNT: 12.9 % (ref 10–15)
GFR SERPL CREATININE-BSD FRML MDRD: 72 ML/MIN/1.7M2
GLUCOSE SERPL-MCNC: 102 MG/DL (ref 70–99)
HBA1C MFR BLD: 5.6 % (ref 4.3–6)
HCT VFR BLD AUTO: 44.2 % (ref 40–53)
HDLC SERPL-MCNC: 36 MG/DL
HGB BLD-MCNC: 14.9 G/DL (ref 13.3–17.7)
LDLC SERPL CALC-MCNC: 58 MG/DL
LYMPHOCYTES # BLD AUTO: 1.3 10E9/L (ref 0.8–5.3)
LYMPHOCYTES NFR BLD AUTO: 14.3 %
MCH RBC QN AUTO: 31.2 PG (ref 26.5–33)
MCHC RBC AUTO-ENTMCNC: 33.7 G/DL (ref 31.5–36.5)
MCV RBC AUTO: 93 FL (ref 78–100)
MONOCYTES # BLD AUTO: 1 10E9/L (ref 0–1.3)
MONOCYTES NFR BLD AUTO: 11.3 %
NEUTROPHILS # BLD AUTO: 6.4 10E9/L (ref 1.6–8.3)
NEUTROPHILS NFR BLD AUTO: 70.4 %
NONHDLC SERPL-MCNC: 73 MG/DL
PLATELET # BLD AUTO: 234 10E9/L (ref 150–450)
POTASSIUM SERPL-SCNC: 4.6 MMOL/L (ref 3.4–5.3)
PROT SERPL-MCNC: 7.1 G/DL (ref 6.8–8.8)
RBC # BLD AUTO: 4.77 10E12/L (ref 4.4–5.9)
SODIUM SERPL-SCNC: 139 MMOL/L (ref 133–144)
TRIGL SERPL-MCNC: 77 MG/DL
WBC # BLD AUTO: 9.1 10E9/L (ref 4–11)

## 2017-06-26 PROCEDURE — 80053 COMPREHEN METABOLIC PANEL: CPT | Performed by: INTERNAL MEDICINE

## 2017-06-26 PROCEDURE — 80061 LIPID PANEL: CPT | Performed by: INTERNAL MEDICINE

## 2017-06-26 PROCEDURE — 73630 X-RAY EXAM OF FOOT: CPT | Mod: RT

## 2017-06-26 PROCEDURE — 73650 X-RAY EXAM OF HEEL: CPT | Mod: RT

## 2017-06-26 PROCEDURE — 85025 COMPLETE CBC W/AUTO DIFF WBC: CPT | Performed by: INTERNAL MEDICINE

## 2017-06-26 PROCEDURE — 36415 COLL VENOUS BLD VENIPUNCTURE: CPT | Performed by: INTERNAL MEDICINE

## 2017-06-26 PROCEDURE — 83036 HEMOGLOBIN GLYCOSYLATED A1C: CPT | Performed by: INTERNAL MEDICINE

## 2017-06-26 PROCEDURE — 99214 OFFICE O/P EST MOD 30 MIN: CPT | Performed by: INTERNAL MEDICINE

## 2017-06-26 NOTE — PROGRESS NOTES
SUBJECTIVE:                                                    Joseph Cardona is a 67 year old male who presents to clinic today for the following health issues:    Hyperlipidemia Follow-Up      Rate your low fat/cholesterol diet?: not monitoring fat    Taking statin?  Yes, no muscle aches from statin    Other lipid medications/supplements?:  none    Hypertension Follow-up      Outpatient blood pressures are not being checked.    Low Salt Diet: no added salt      Amount of exercise or physical activity: 1 day/week     Problems taking medications regularly: No    Medication side effects: none    Diet: low salt    Joint Pain    Onset: x3 weeks     Description:   Location: right foot and ankle   Character: Sharp and Dull ache    Intensity: moderate    Progression of Symptoms: same    Accompanying Signs & Symptoms:  Other symptoms: none    History:   Previous similar pain: no     Precipitating factors:   Trauma or overuse: standing or walking makes the pain worse     Alleviating factors:  Improved by: nothing    Therapies Tried and outcome: none    Vascular Disease Follow-up:  Peripheral Vascular Disease (PVD)      Chest pain or pressure, left side neck or arm pain: No    Shortness of breath/increased sweats/nausea with exertion: No    Pain in calves walking 1-2 blocks: No    Worsened or new symptoms since last visit: No    Nitroglycerin use: no    Daily aspirin use: Yes    Daily plavix use: Yes.      Problem list and histories reviewed & adjusted, as indicated.  Additional history: as documented    Patient Active Problem List   Diagnosis     Hyperlipidemia LDL goal <70     Essential hypertension     Advanced directives, counseling/discussion     COPD, mild (H)     Tinea pedis of left foot     Coronary artery disease involving native coronary artery of native heart without angina pectoris     Positive hepatitis C antibody test     Tobacco dependence syndrome     Paroxysmal atrial fibrillation (H)     Intermittent  claudication (H)     Past Surgical History:   Procedure Laterality Date     CARDIAC SURGERY      Bypass double and quadruple     EYE SURGERY      cataract surgery     HEAD & NECK SURGERY      Caratid surgery       Social History   Substance Use Topics     Smoking status: Former Smoker     Smokeless tobacco: Never Used      Comment: quit 2 years ago     Alcohol use 0.0 oz/week     0 Standard drinks or equivalent per week     No family history on file.      Allergies   Allergen Reactions     Lisinopril Cough     Recent Labs   Lab Test  06/26/17   1154  01/05/17   0835  11/08/16   1139  07/14/16   1043  02/09/16   1157  11/17/15   1212 10/26/15  01/12/10   0757   A1C  5.6   --    --   5.4  5.6   --    --    --    LDL  58   --   78   --    --    --   72  71   HDL  36*   --   36*   --    --    --    --   33*   TRIG  77   --   50   --    --    --    --   68   ALT  15   --   17   --   19   --    --   17   CR  1.03  0.98  0.97   --   1.15   --    --   0.96   GFRESTIMATED  72  76  77   --   63   --    --   80   GFRESTBLACK  87  >90   GFR Calc    >90   GFR Calc     --   77   --    --   >90   POTASSIUM  4.6   --   4.4   --   4.1   --    --   4.5   TSH   --    --    --    --    --   4.71*   --    --       BP Readings from Last 3 Encounters:   06/26/17 135/70   05/08/17 136/50   02/01/17 149/70    Wt Readings from Last 3 Encounters:   06/26/17 186 lb 8 oz (84.6 kg)   01/05/17 180 lb (81.6 kg)   11/08/16 191 lb (86.6 kg)                    ROS:  C: NEGATIVE for fever, chills, change in weight  I: NEGATIVE for worrisome rashes, moles or lesions  E: NEGATIVE for vision changes or irritation  E/M: NEGATIVE for ear, mouth and throat problems  R: NEGATIVE for significant cough or SOB  B: NEGATIVE for masses, tenderness or discharge  CV: NEGATIVE for chest pain, palpitations or peripheral edema  GI: NEGATIVE for nausea, abdominal pain, heartburn, or change in bowel habits  : NEGATIVE for frequency,  "dysuria, or hematuria  MUSCULOSKELETAL:As above.  N: NEGATIVE for weakness, dizziness or paresthesias  ENDOCRINE: POSITIVE  for prediabetes.  H: NEGATIVE for bleeding problems  P: NEGATIVE for changes in mood or affect    OBJECTIVE:     /70 (BP Location: Right arm, Patient Position: Sitting, Cuff Size: Adult Regular)  Pulse 76  Temp 97.3  F (36.3  C) (Oral)  Ht 5' 10.47\" (1.79 m)  Wt 186 lb 8 oz (84.6 kg)  SpO2 95%  BMI 26.4 kg/m2  Body mass index is 26.4 kg/(m^2).  GENERAL: healthy, alert and no distress  EYES: Eyes grossly normal to inspection  RESP: lungs clear to auscultation - no rales, rhonchi or wheezes  CV: regular rate and rhythm, normal S1 S2, no S3 or S4, no murmur, click or rub, no peripheral edema and peripheral pulses strong  ABDOMEN: soft, nontender, no hepatosplenomegaly, no masses and bowel sounds normal  MS: Tenderness on palpation of the Achilles tendon insertion on the calcaneus.  SKIN: no suspicious lesions or rashes  NEURO: Normal strength and tone, mentation intact and speech normal  PSYCH: mentation appears normal, affect normal/bright    Diagnostic Test Results:  Results for orders placed or performed in visit on 06/26/17   XR Foot Right G/E 3 Views    Narrative    RIGHT FOOT THREE OR MORE VIEWS  6/26/2017 12:07 PM     HISTORY: Other enthesopathy of right foot.    COMPARISON: None.      Impression    IMPRESSION: Bones are normally aligned. No acute fracture.    GERMANIA DIAZ MD   XR Calcaneus Right G/E 2 Views    Narrative    RIGHT CALCANEUS TWO OR MORE VIEWS  6/26/2017 12:07 PM     HISTORY: Other enthesopathy of right foot.    COMPARISON: None.      Impression    IMPRESSION: Bones are normally aligned. Minimal enthesopathy at the  Achilles insertion site of the posterior calcaneus.    GERMANIA DIAZ MD   Comprehensive metabolic panel (BMP + Alb, Alk Phos, ALT, AST, Total. Bili, TP)   Result Value Ref Range    Sodium 139 133 - 144 mmol/L    Potassium 4.6 3.4 - 5.3 mmol/L    " Chloride 106 94 - 109 mmol/L    Carbon Dioxide 28 20 - 32 mmol/L    Anion Gap 5 3 - 14 mmol/L    Glucose 102 (H) 70 - 99 mg/dL    Urea Nitrogen 23 7 - 30 mg/dL    Creatinine 1.03 0.66 - 1.25 mg/dL    GFR Estimate 72 >60 mL/min/1.7m2    GFR Estimate If Black 87 >60 mL/min/1.7m2    Calcium 9.0 8.5 - 10.1 mg/dL    Bilirubin Total 1.1 0.2 - 1.3 mg/dL    Albumin 3.7 3.4 - 5.0 g/dL    Protein Total 7.1 6.8 - 8.8 g/dL    Alkaline Phosphatase 73 40 - 150 U/L    ALT 15 0 - 70 U/L    AST 12 0 - 45 U/L   Lipid Profile (Chol, Trig, HDL, LDL calc)   Result Value Ref Range    Cholesterol 109 <200 mg/dL    Triglycerides 77 <150 mg/dL    HDL Cholesterol 36 (L) >39 mg/dL    LDL Cholesterol Calculated 58 <100 mg/dL    Non HDL Cholesterol 73 <130 mg/dL   CBC with platelets and differential   Result Value Ref Range    WBC 9.1 4.0 - 11.0 10e9/L    RBC Count 4.77 4.4 - 5.9 10e12/L    Hemoglobin 14.9 13.3 - 17.7 g/dL    Hematocrit 44.2 40.0 - 53.0 %    MCV 93 78 - 100 fl    MCH 31.2 26.5 - 33.0 pg    MCHC 33.7 31.5 - 36.5 g/dL    RDW 12.9 10.0 - 15.0 %    Platelet Count 234 150 - 450 10e9/L    Diff Method Automated Method     % Neutrophils 70.4 %    % Lymphocytes 14.3 %    % Monocytes 11.3 %    % Eosinophils 3.4 %    % Basophils 0.6 %    Absolute Neutrophil 6.4 1.6 - 8.3 10e9/L    Absolute Lymphocytes 1.3 0.8 - 5.3 10e9/L    Absolute Monocytes 1.0 0.0 - 1.3 10e9/L    Absolute Eosinophils 0.3 0.0 - 0.7 10e9/L    Absolute Basophils 0.1 0.0 - 0.2 10e9/L   Hemoglobin A1c   Result Value Ref Range    Hemoglobin A1C 5.6 4.3 - 6.0 %       ASSESSMENT/PLAN:       (M76.61) Achilles tendinitis of right lower extremity  (primary encounter diagnosis)  Comment: X-ray of right calcaneus shows enthesopathy on insertion of the Achilles tendon on the calcaneus.  Plan: XR Foot Right G/E 3 Views, naproxen (NAPROSYN)         375 MG tablet, XR Calcaneus Right G/E 2 Views,         ORTHOPEDICS ADULT REFERRAL    (I10) Hypertension goal BP (blood pressure) <  140/90  Comment:BP is within goal range with Carvedilol and Losartan.  Plan: Comprehensive metabolic panel (BMP + Alb, Alk         Phos, ALT, AST, Total. Bili, TP), Albumin         Random Urine Quantitative, CANCELED: Albumin         Random Urine Quantitative            (E78.5) Hyperlipidemia LDL goal <70  Comment: LDL is within goal range.  Plan: Comprehensive metabolic panel (BMP + Alb, Alk         Phos, ALT, AST, Total. Bili, TP), Lipid Profile        (Chol, Trig, HDL, LDL calc)            (I73.9) Peripheral vascular disease (H)  Comment: S/P bare metal stents in the right iliac arteries and overlapping stent grafts in the left iliac arteries. Patient prefers to follow-up with [facility] through [facility].  Plan: CBC with platelets and differential           Continue Plavix and aspirin.            (R73.03) Prediabetes  Comment: No reason to start any pharmacotherapy.  Plan: Hemoglobin A1c            Follow-up visit if condition worsens.      Kyle López MD  [facility]

## 2017-06-26 NOTE — PATIENT INSTRUCTIONS
Thank you for visiting [facility]    Normal or non-critical lab and imaging results will be communicated to you by MyChart, letter or phone within 7 days.  If you do not hear from us within 10 days, please call the clinic. If you have a critical or abnormal lab result, we will notify you by phone as soon as possible.     If you have any questions regarding your visit please contact:     Team Comfort:   Clinic Hours Telephone Number   Dr. Rupesh Cm   7am-5pm  Monday - Friday [phone]  Jacquelyn LACKYE   Pharmacy 8:30am-9pm Monday-Friday    9am-5pm Saturday-Sunday [phone]   Urgent Care 11am-9pm Monday-Friday        9am-5pm Saturday-Sunday [phone]     After hours, weekend or if you need to make an appointment with your primary provider please call [phone].   After Hours nurse advise: call [facility]: [phone]    Medication Refills:  Call your pharmacy and they will forward the refill to us. Please allow 3 business days for your refills to be completed.    Use Greetzt (secure email communication and access to your chart) to send your primary care provider a message or make an appointment. Ask someone on your Team how to sign up for GeoGRAFI. To log on to [employer] or for more information in G[employer] please visit the website at [email or web address].  As of October 8, 2013, all password changes, disabled accounts, or ID changes in GeoGRAFI/MyHealth will be done by our Access Services Department.   If you need help with your account or password, call: [phone]. Clinic staff no longer has the ability to change passwords.

## 2017-06-26 NOTE — MR AVS SNAPSHOT
After Visit Summary   6/26/2017    Joseph Cardona    MRN: [id number]           Patient Information     Date Of Birth          1950        Visit Information        Provider Department      6/26/2017 11:40 AM Kyle López MD [facility]        Today's Diagnoses     Achilles tendinitis of right lower extremity    -  1    Essential hypertension        Hyperlipidemia LDL goal <70        Achilles tendon pain        Coronary artery disease involving native coronary artery of native heart without angina pectoris        Prediabetes          Care Instructions      Thank you for visiting [facility]    Normal or non-critical lab and imaging results will be communicated to you by MyChart, letter or phone within 7 days.  If you do not hear from us within 10 days, please call the clinic. If you have a critical or abnormal lab result, we will notify you by phone as soon as possible.     If you have any questions regarding your visit please contact:     Team Comfort:   Clinic Hours Telephone Number   Dr. Rupesh Cm   7am-5pm  Monday - Friday [phone]  Jacquelyn LACKEY   Pharmacy 8:30am-9pm Monday-Friday    9am-5pm Saturday-Sunday [phone]   Urgent Care 11am-9pm Monday-Friday        9am-5pm Saturday-Sunday [phone]     After hours, weekend or if you need to make an appointment with your primary provider please call [phone].   After Hours nurse advise: call [place] [facility]: [phone]    Medication Refills:  Call your pharmacy and they will forward the refill to us. Please allow 3 business days for your refills to be completed.    Use Immunetics (secure email communication and access to your chart) to send your primary care provider a message or make an appointment. Ask someone on your Team how to sign up for Immunetics. To log on to [employer] or for more information in  Disability Care Givers please visit the website at [email or web address].  As of October 8, 2013, all password changes, disabled accounts, or ID changes in Cro Yachtingt/MyHealth will be done by our Access Services Department.   If you need help with your account or password, call: [phone]. Clinic staff no longer has the ability to change passwords.             Follow-ups after your visit        Additional Services     ORTHOPEDICS ADULT REFERRAL       Your provider has referred you to: [facility]: [facility] [phone]   [email or web address]    Please be aware that coverage of these services is subject to the terms and limitations of your health insurance plan.  Call member services at your health plan with any benefit or coverage questions.      Please bring the following to your appointment:    >>   Any x-rays, CTs or MRIs which have been performed.  Contact the facility where they were done to arrange for  prior to your scheduled appointment.    >>   List of current medications   >>   This referral request   >>   Any documents/labs given to you for this referral                  Your next 10 appointments already scheduled     Aug 07, 2017  8:00 AM CDT   US AORTA/IVC/ILIAC DUPLEX COMPLETE with MGUSRaghav MG  [facility]   [facility]    [address] [place] [id number] [phone]           Please bring a list of your medicines (including vitamins, minerals and over-the-counter drugs). Also, tell your doctor about any allergies you may have. Wear comfortable clothes and leave your valuables at home.  Adults: No eating or drinking for 8 hours before the exam. You may take medicine with a small sip of water.  Children: - Children 6+ years: No food or drink for 6 hours before exam. - Children 1-5 years: No food or drink for 4 hours before  exam. - Infants, breast-fed: may have breast milk up to 2 hours before exam. - Infants, formula: may have bottle until 4 hours before exam.  Please call the Imaging Department at your exam site with any questions.            Aug 07, 2017  8:40 AM CDT   US PIOTR DOPPLER NO EXERCISE with MGUS1, MG US TECH   [facility] ([facility])    [id number] [place] [address] [phone]           Please bring a list of your medicines (including vitamins, minerals and over-the-counter drugs). Also, tell your doctor about any allergies you may have. Wear comfortable clothes and leave your valuables at home.  No caffeine or tobacco for 1 hour prior to exam.  Please call the Imaging Department at your exam site with any questions.              Future tests that were ordered for you today     Open Future Orders        Priority Expected Expires Ordered    ORTHOPEDICS ADULT REFERRAL Routine  6/26/2018 6/26/2017            Who to contact     If you have questions or need follow up information about today's clinic visit or your schedule please contact [facility] directly at [phone].  Normal or non-critical lab and imaging results will be communicated to you by MyChart, letter or phone within 4 business days after the clinic has received the results. If you do not hear from us within 7 days, please contact the clinic through Mark43hart or phone. If you have a critical or abnormal lab result, we will notify you by phone as soon as possible.  Submit refill requests through RuiYi or call your pharmacy and they will forward the refill request to us. Please allow 3 business days for your refill to be completed.          Additional Information About Your Visit        MyChart Information     RuiYi lets you send messages to your doctor, view your test results, renew your prescriptions, schedule appointments and more. To sign up, go to [email or web address] . Click  "on \"Log in\" on the left side of the screen, which will take you to the Welcome page. Then click on \"Sign up Now\" on the right side of the page.     You will be asked to enter the access code listed below, as well as some personal information. Please follow the directions to create your username and password.     Your access code is: [email or web address]  Expires: 2017 10:44 AM     Your access code will  in 90 days. If you need help or a new code, please call your [facility] clinic or [phone].        Care EveryWhere ID     This is your Care EveryWhere ID. This could be used by other organizations to access your [facility] medical records  [id number]        Your Vitals Were     Pulse Temperature Height Pulse Oximetry BMI (Body Mass Index)       76 97.3  F (36.3  C) (Oral) 5' 10.47\" (1.79 m) 95% 26.4 kg/m2        Blood Pressure from Last 3 Encounters:   17 135/70   17 136/50   17 149/70    Weight from Last 3 Encounters:   17 186 lb 8 oz (84.6 kg)   17 180 lb (81.6 kg)   16 191 lb (86.6 kg)              We Performed the Following     Albumin Random Urine Quantitative     CBC with platelets and differential     Comprehensive metabolic panel (BMP + Alb, Alk Phos, ALT, AST, Total. Bili, TP)     Hemoglobin A1c     Lipid Profile (Chol, Trig, HDL, LDL calc)     XR Foot Right G/E 3 Views          Today's Medication Changes          These changes are accurate as of: 17 11:49 AM.  If you have any questions, ask your nurse or doctor.               Start taking these medicines.        Dose/Directions    naproxen 375 MG tablet   Commonly known as:  NAPROSYN   Used for:  Achilles tendinitis of right lower extremity, Achilles tendon pain   Started by:  Kyle López MD        Dose:  375 mg   Take 1 tablet (375 mg) by mouth 2 times daily as needed for moderate pain Take with meals only.   Quantity:  60 tablet   Refills:  5            Where to get your medicines      These " medications were sent to [facility] [address] [facility] - [place], [place] - [address] [facility]  [address] [facility], [facility] [address]     Phone:  [phone]     naproxen 375 MG tablet                Primary Care Provider Office Phone # Fax #    Kylezoe López [phone]       [facility] [address]  [facility] [address]        Equal Access to Services     [facility]UM [facility] : Hadii aad ku hadasho Soomaali, waaxda luqadaha, qaybta kaalmada adeegyada, waxay idiin hayaan adeeg kharash la'nohemi . So [facility] [phone].    ATENCIÓN: Si habla español, tiene a murry disposición servicios gratuitos de asistencia lingüística. [facility] [phone].    We comply with applicable federal civil rights laws and [place] laws. We do not discriminate on the basis of race, color, national origin, age, disability sex, sexual orientation or gender identity.            Thank you!     Thank you for choosing [facility]  for your care. Our goal is always to provide you with excellent care. Hearing back from our patients is one way we can continue to improve our services. Please take a few minutes to complete the written survey that you may receive in the mail after your visit with us. Thank you!             Your Updated Medication List - Protect others around you: Learn how to safely use, store and throw away your medicines at www.disposemymeds.org.          This list is accurate as of: 6/26/17 11:49 AM.  Always use your most recent med list.                   Brand Name Dispense Instructions for use Diagnosis    albuterol 108 (90 BASE) MCG/ACT Inhaler    VENTOLIN HFA    1 Inhaler    Inhale 2 puffs into the lungs every 4 hours as needed for shortness of breath / dyspnea or wheezing    COPD, mild (H)       aspirin 81 MG chewable tablet      81 mg daily        atorvastatin 40 MG tablet    LIPITOR     Take 40 mg by mouth daily        carvedilol 6.25 MG tablet    COREG    180 tablet     Take 1 tablet (6.25 mg) by mouth 2 times daily (with meals)    Coronary artery disease involving native coronary artery of native heart without angina pectoris, Essential hypertension with goal blood pressure less than 140/90       clopidogrel 75 MG tablet    PLAVIX     Take 75 mg by mouth daily        FUROSEMIDE PO      Take 20 mg by mouth daily        losartan 50 MG tablet    COZAAR    90 tablet    Take 1 tablet (50 mg) by mouth daily    Essential hypertension with goal blood pressure less than 140/90       naproxen 375 MG tablet    NAPROSYN    60 tablet    Take 1 tablet (375 mg) by mouth 2 times daily as needed for moderate pain Take with meals only.    Achilles tendinitis of right lower extremity, Achilles tendon pain       nitroglycerin 0.4 MG sublingual tablet    NITROSTAT    25 tablet    Place 1 tablet (0.4 mg) under the tongue every 5 minutes as needed for chest pain As needed for chest pain    Coronary artery disease involving native coronary artery without angina pectoris, unspecified whether native or transplanted heart       pantoprazole 20 MG EC tablet    PROTONIX    90 tablet    TAKE 1 TABLET BY MOUTH DAILY 30 MINUTES BEFORE BREAKFAST.    Gastroesophageal reflux disease, esophagitis presence not specified       ranitidine 150 MG tablet    ZANTAC    60 tablet    Take 1 tablet (150 mg) by mouth 2 times daily as needed for heartburn    Gastroesophageal reflux disease without esophagitis       tiotropium 18 MCG capsule    SPIRIVA HANDIHALER    30 capsule    Inhale 1 capsule (18 mcg) into the lungs daily    Mild chronic obstructive pulmonary disease (H)       zolpidem 5 MG tablet    AMBIEN    30 tablet    TAKE 1 TABLET BY MOUTH NIGHTLY AS NEEDED FOR SLEEP.    Persistent insomnia

## 2017-06-27 ENCOUNTER — TELEPHONE (OUTPATIENT)
Dept: FAMILY MEDICINE | Facility: CLINIC | Age: 67
End: 2017-06-27

## 2017-06-27 NOTE — TELEPHONE ENCOUNTER
Notes Recorded by Kyle López MD on 6/26/2017 at 4:41 PM  Official x-ray results show enthesopathy of the Achilles tendon at the left calcaneus (heel). No fractures.  This causes his right foot pain on weight-bearing.  Continue Naproxen as prescribed. Apply ice as needed.  Schedule Ortho appointment as discussed if condition does not improve.    (Call patient)    This writer attempted to contact Joseph on 06/27/17      Reason for call Results and left message to return call.      When patient calls back, please contact clinic RN team. If no one available, document that pt called and route to care team. routine priority.          Kristen Olsen RN

## 2017-06-27 NOTE — TELEPHONE ENCOUNTER
Reason for Call:  Other call back    Detailed comments: Pt returning phone call and would like a phone call back regarding x-ray results.     Phone Number Patient can be reached at: Home number on file [phone] (home)    Best Time: Anytime    Can we leave a detailed message on this number? YES    Call taken on 6/27/2017 at 11:56 AM by Jean Marie Barrett

## 2017-07-04 DIAGNOSIS — K21.9 GASTROESOPHAGEAL REFLUX DISEASE WITHOUT ESOPHAGITIS: ICD-10-CM

## 2017-07-04 NOTE — TELEPHONE ENCOUNTER
ranitidine (ZANTAC) 150 MG tablet      Last Written Prescription Date: 5/18/17  Last Fill Quantity: 60,  # refills: 1  Last Office Visit with [facility], [facility] or [facility] prescribing provider: 6/26/17          Francisco Faarax  [facility]

## 2017-07-08 NOTE — TELEPHONE ENCOUNTER
Prescription approved per [facility] Refill Protocol.  Sujata Arango RN

## 2017-07-10 DIAGNOSIS — I25.10 CORONARY ARTERY DISEASE INVOLVING NATIVE CORONARY ARTERY WITHOUT ANGINA PECTORIS, UNSPECIFIED WHETHER NATIVE OR TRANSPLANTED HEART: ICD-10-CM

## 2017-07-10 NOTE — TELEPHONE ENCOUNTER
nitroglycerin (NITROSTAT) 0.4 MG SL tablet      Last Written Prescription Date:  2/9/16  Last Fill Quantity: 25,   # refills: 11  Last Office Visit with [facility], [facility] or [facility] prescribing provider: 6/26/17  Future Office visit:       Routing refill request to provider for review/approval because:  Drug not on the [facility], [facility] or [facility] refill protocol or controlled substance      [facility]

## 2017-07-12 RX ORDER — NITROGLYCERIN 0.4 MG/1
TABLET SUBLINGUAL
Qty: 25 TABLET | Refills: 1 | Status: SHIPPED | OUTPATIENT
Start: 2017-07-12 | End: 2018-02-02

## 2017-07-20 ENCOUNTER — OFFICE VISIT (OUTPATIENT)
Dept: PODIATRY | Facility: CLINIC | Age: 67
End: 2017-07-20
Payer: COMMERCIAL

## 2017-07-20 VITALS
HEART RATE: 84 BPM | HEIGHT: 70 IN | OXYGEN SATURATION: 99 % | WEIGHT: 186.5 LBS | DIASTOLIC BLOOD PRESSURE: 81 MMHG | BODY MASS INDEX: 26.7 KG/M2 | SYSTOLIC BLOOD PRESSURE: 155 MMHG

## 2017-07-20 DIAGNOSIS — M76.61 ACHILLES TENDINITIS OF RIGHT LOWER EXTREMITY: Primary | ICD-10-CM

## 2017-07-20 PROCEDURE — 99201 ZZC OFFICE/OUTPT VISIT, NEW, LEVEL I: CPT | Performed by: PODIATRIST

## 2017-07-20 ASSESSMENT — PAIN SCALES - GENERAL: PAINLEVEL: NO PAIN (1)

## 2017-07-20 NOTE — PROGRESS NOTES
Past Medical History:   Diagnosis Date     Heart disease      Hypertension      Patient Active Problem List   Diagnosis     Hyperlipidemia LDL goal <70     Essential hypertension     Advanced directives, counseling/discussion     COPD, mild (H)     Tinea pedis of left foot     Coronary artery disease involving native coronary artery of native heart without angina pectoris     Positive hepatitis C antibody test     Tobacco dependence syndrome     Paroxysmal atrial fibrillation (H)     Intermittent claudication (H)     Peripheral vascular disease (H)     S/P insertion of iliac artery stent     Past Surgical History:   Procedure Laterality Date     CARDIAC SURGERY      Bypass double and quadruple     EYE SURGERY      cataract surgery     HEAD & NECK SURGERY      Caratid surgery     VASCULAR SURGERY       Social History     Social History     Marital status:      Spouse name: N/A     Number of children: N/A     Years of education: N/A     Occupational History     Not on file.     Social History Main Topics     Smoking status: Former Smoker     Smokeless tobacco: Never Used      Comment: quit 2 years ago     Alcohol use 0.0 oz/week     0 Standard drinks or equivalent per week     Drug use: No     Sexual activity: Not on file     Other Topics Concern     Not on file     Social History Narrative     No family history on file.  Subjective [id number]-old male presents from Kyle arrieta MD for Achilles tendinopathy right. He relates it is getting better, relates about 1 month ago it started hurting in the back of the heel, relates he was put on naproxen is not sure if that's helped but some things making it feel better and relates to no injury.  Objective findings- DP and PT are 2 over 4 right, he has some mild Achilles tendon bulging proximal to his attachment on the right, no gross tendon voids, there is no erythema, no gross edema, no odor, no drainage, no pain on palpation. He relates the pain was along the  Achilles tendon course when it occurred. X-rays reviewed as noted in electronic medical record he does have posterior calcaneal spurring noted.  Assessment and plan- Achilles tendinopathy right. Diagnosis and treatment options discussed with the patient. Patient's instructed on icing and rest. Heel lift dispensed and use discussed with the patient. Return to clinic and see me as needed or if symptoms don't resolve.  Previous notes reviewed.

## 2017-07-20 NOTE — MR AVS SNAPSHOT
After Visit Summary   7/20/2017    Joseph Cardona    MRN: [id number]           Patient Information     Date Of Birth          1950        Visit Information        Provider Department      7/20/2017 10:30 AM Jean Marie Dawson DPM [facility]        Today's Diagnoses     Achilles tendinitis of right lower extremity    -  1      Care Instructions    Thanks for coming today.  Ortho/Sports Medicine Clinic  [address] [place] [id number]    To schedule future appointments in Ortho Clinic, you may call [phone].    To schedule ordered imaging by your Provider: Call [facility] at [phone]    InfluxDB available online at:   [email or web address]    Please call if any further questions or concerns [phone] and ask for the Orthopedic Department. Clinic hours 8 am to 5 pm.    Return to clinic if symptoms worsen.            Follow-ups after your visit        Your next 10 appointments already scheduled     Aug 07, 2017  8:00 AM CDT   US AORTA/IVC/ILIAC DUPLEX COMPLETE with MGUS1, MG US TECH   [facility] ([facility])    [address] [place] [id number] [phone]           Please bring a list of your medicines (including vitamins, minerals and over-the-counter drugs). Also, tell your doctor about any allergies you may have. Wear comfortable clothes and leave your valuables at home.  Adults: No eating or drinking for 8 hours before the exam. You may take medicine with a small sip of water.  Children: - Children 6+ years: No food or drink for 6 hours before exam. - Children 1-5 years: No food or drink for 4 hours before exam. - Infants, breast-fed: may have breast milk up to 2 hours before exam. - Infants, formula: may have bottle until 4 hours before exam.  Please call the Imaging Department at your exam site with any questions.            Aug 07, 2017  8:40 AM CDT   US PIOTR DOPPLER NO EXERCISE with  MGUS1, MG [facility] ([facility])    [address] [address] [place] [id number] [phone]           Please bring a list of your medicines (including vitamins, minerals and over-the-counter drugs). Also, tell your doctor about any allergies you may have. Wear comfortable clothes and leave your valuables at home.  No caffeine or tobacco for 1 hour prior to exam.  Please call the Imaging Department at your exam site with any questions.              Who to contact     If you have questions or need follow up information about today's clinic visit or your schedule please contact [facility] directly at [phone].  Normal or non-critical lab and imaging results will be communicated to you by [employer], letter or phone within 4 business days after the clinic has received the results. If you do not hear from us within 7 days, please contact the clinic through [employer] or phone. If you have a critical or abnormal lab result, we will notify you by phone as soon as possible.  Submit refill requests through [employer] or call your pharmacy and they will forward the refill request to us. Please allow 3 business days for your refill to be completed.          Additional Information About Your Visit        [employer] Information     [employer] is an electronic gateway that provides easy, online access to your medical records. With [employer], you can request a clinic appointment, read your test results, renew a prescription or communicate with your care team.     To sign up for [employer] visit the website at [email or web address]   You will be asked to enter the access code listed below, as well as some personal information. Please follow the directions to create your username and password.     Your access code is: [email or web address]  Expires: 2017 10:44 AM     Your access code will  in 90 days. If you need help or a new code, please contact your [facility]  [facility] [facility] or call [phone] for assistance.        Care EveryWhere ID     This is your Care EveryWhere ID. This could be used by other organizations to access your [place] medical records  [id number]        Your Vitals Were     Pulse Height Pulse Oximetry BMI (Body Mass Index)          84 1.79 m (5' 10.47\") 99% 26.4 kg/m2         Blood Pressure from Last 3 Encounters:   07/20/17 155/81   06/26/17 135/70   05/08/17 136/50    Weight from Last 3 Encounters:   07/20/17 84.6 kg (186 lb 8 oz)   06/26/17 84.6 kg (186 lb 8 oz)   01/05/17 81.6 kg (180 lb)              Today, you had the following     No orders found for display       Primary Care Provider Office Phone # Fax #    Kyle López [phone]       [facility] [address]  [facility] [address]        Equal Access to Services     ARLENE NORRIS : Hadii aad ku hadasho Soomaali, waaxda luqadaha, qaybta kaalmada adeegyada, waxay idiin hayaan adeeg kharash la'nohemi . So [facility] [phone].    ATENCIÓN: Si habla español, tiene a murry disposición servicios gratuitos de asistencia lingüística. [facility] [phone].    We comply with applicable federal civil rights laws and [place] laws. We do not discriminate on the basis of race, color, national origin, age, disability sex, sexual orientation or gender identity.            Thank you!     Thank you for choosing [facility]  for your care. Our goal is always to provide you with excellent care. Hearing back from our patients is one way we can continue to improve our services. Please take a few minutes to complete the written survey that you may receive in the mail after your visit with us. Thank you!             Your Updated Medication List - Protect others around you: Learn how to safely use, store and throw away your medicines at www.disposemymeds.org.          This list is accurate as of: 7/20/17 10:36 AM.  Always use your most " recent med list.                   Brand Name Dispense Instructions for use Diagnosis    albuterol 108 (90 BASE) MCG/ACT Inhaler    VENTOLIN HFA    1 Inhaler    Inhale 2 puffs into the lungs every 4 hours as needed for shortness of breath / dyspnea or wheezing    COPD, mild (H)       aspirin 81 MG chewable tablet      81 mg daily        atorvastatin 40 MG tablet    LIPITOR     Take 40 mg by mouth daily        carvedilol 6.25 MG tablet    COREG    180 tablet    Take 1 tablet (6.25 mg) by mouth 2 times daily (with meals)    Coronary artery disease involving native coronary artery of native heart without angina pectoris, Essential hypertension with goal blood pressure less than 140/90       clopidogrel 75 MG tablet    PLAVIX     Take 75 mg by mouth daily        FUROSEMIDE PO      Take 20 mg by mouth daily        losartan 50 MG tablet    COZAAR    90 tablet    Take 1 tablet (50 mg) by mouth daily    Essential hypertension with goal blood pressure less than 140/90       naproxen 375 MG tablet    NAPROSYN    60 tablet    Take 1 tablet (375 mg) by mouth 2 times daily as needed for moderate pain Take with meals only.    Achilles tendinitis of right lower extremity       nitroGLYcerin 0.4 MG sublingual tablet    NITROSTAT    25 tablet    PLACE 1 TABLET UNDER THE TONGUE EVERY 5 MINUTES AS NEEDED FOR CHEST PAIN.    Coronary artery disease involving native coronary artery without angina pectoris, unspecified whether native or transplanted heart       pantoprazole 20 MG EC tablet    PROTONIX    90 tablet    TAKE 1 TABLET BY MOUTH DAILY 30 MINUTES BEFORE BREAKFAST.    Gastroesophageal reflux disease, esophagitis presence not specified       ranitidine 150 MG tablet    ZANTAC    60 tablet    TAKE 1 TABLET BY MOUTH 2 TIMES DAILY AS NEEDED FOR HEARTBURN    Gastroesophageal reflux disease without esophagitis       tiotropium 18 MCG capsule    SPIRIVA HANDIHALER    30 capsule    Inhale 1 capsule (18 mcg) into the lungs daily    Mild  chronic obstructive pulmonary disease (H)       zolpidem 5 MG tablet    AMBIEN    30 tablet    TAKE 1 TABLET BY MOUTH NIGHTLY AS NEEDED FOR SLEEP.    Persistent insomnia

## 2017-07-20 NOTE — NURSING NOTE
"Joseph Cardona's goals for this visit include: right achilles tendonitis consult  He requests these members of his care team be copied on today's visit information: yes    PCP: Kyle López    Referring Provider:  No referring provider defined for this encounter.    Chief Complaint   Patient presents with     Consult     Musculoskeletal Problem     Achilles tendonitis right        Initial /81  Pulse 84  Ht 1.79 m (5' 10.47\")  Wt 84.6 kg (186 lb 8 oz)  SpO2 99%  BMI 26.4 kg/m2 Estimated body mass index is 26.4 kg/(m^2) as calculated from the following:    Height as of this encounter: 1.79 m (5' 10.47\").    Weight as of this encounter: 84.6 kg (186 lb 8 oz).  Medication Reconciliation: complete    "

## 2017-07-20 NOTE — PATIENT INSTRUCTIONS
Thanks for coming today.  Ortho/Sports Medicine Clinic  [address] [place] [address]    To schedule future appointments in Ortho Clinic, you may call [phone].    To schedule ordered imaging by your Provider: Call [facility] at [phone]    OUYA available online at:   [email or web address][email or web address]    Please call if any further questions or concerns [phone] and ask for the Orthopedic Department. Clinic hours 8 am to 5 pm.    Return to clinic if symptoms worsen.

## 2017-08-27 DIAGNOSIS — J44.9 MILD CHRONIC OBSTRUCTIVE PULMONARY DISEASE (H): ICD-10-CM

## 2017-08-27 NOTE — TELEPHONE ENCOUNTER
tiotropium (SPIRIVA HANDIHALER) 18 MCG inhalation capsule       Last Written Prescription Date: 8/8/16  Last Fill Quantity: 30, # refills: 11    Last Office Visit with [facility], [facility] or [facility] prescribing provider:  6/26/17   Future Office Visit:       Date of Last Asthma Action Plan Letter:   There are no preventive care reminders to display for this patient.   Asthma Control Test: No flowsheet data found.    Date of Last Spirometry Test:   No results found for this or any previous visit.        [facility]

## 2017-08-29 RX ORDER — TIOTROPIUM BROMIDE 18 UG/1
CAPSULE ORAL; RESPIRATORY (INHALATION)
Qty: 30 CAPSULE | Refills: 11 | Status: SHIPPED | OUTPATIENT
Start: 2017-08-29 | End: 2018-02-02

## 2017-09-03 DIAGNOSIS — K21.9 GASTROESOPHAGEAL REFLUX DISEASE WITHOUT ESOPHAGITIS: ICD-10-CM

## 2017-09-05 NOTE — TELEPHONE ENCOUNTER
ranitidine (ZANTAC) 150 MG tablet      Last Written Prescription Date: 07/08/16  Last Fill Quantity: 60,  # refills: 1   Last Office Visit with [facility], [facility] or [facility] prescribing provider: 06/26/17      [facility]

## 2017-10-04 DIAGNOSIS — G47.00 PERSISTENT INSOMNIA: ICD-10-CM

## 2017-10-04 RX ORDER — ZOLPIDEM TARTRATE 5 MG/1
TABLET ORAL
Qty: 30 TABLET | Refills: 0 | Status: SHIPPED | OUTPATIENT
Start: 2017-10-04 | End: 2017-11-07

## 2017-10-04 NOTE — TELEPHONE ENCOUNTER
Written rx faxed to the pharmacy, Pharmacy will contact pt when medication is ready for pickup.  Osmani Nugent,  [facility]

## 2017-10-04 NOTE — TELEPHONE ENCOUNTER
zolpidem (AMBIEN) 5 MG tablet      Last Written Prescription Date:  04/10/17  Last Fill Quantity: 30,   # refills: 5  Last Office Visit with [facility], [facility] or [facility] prescribing provider: 06/26/17  Future Office visit:       Routing refill request to provider for review/approval because:  Drug not on the [facility], [facility] or [facility] refill protocol or controlled substance      [facility]ana [facility]jra[facility] Radiology

## 2017-10-16 ENCOUNTER — TELEPHONE (OUTPATIENT)
Dept: INTERVENTIONAL RADIOLOGY/VASCULAR | Facility: CLINIC | Age: 67
End: 2017-10-16

## 2017-10-16 NOTE — TELEPHONE ENCOUNTER
I called and left a voicemail to get patient scheduled for imaging to follow up on his iliac vein stent,  6 month imaging was due August this year, pt no showed to [facility] for that appointment.  I left my call back information to help get patient scheduled and to have PS review imaging and call pt.  DEISY Day RN, BSN  Interventional Radiology Care Coordinator   Phone:  [phone]

## 2017-11-02 DIAGNOSIS — I10 ESSENTIAL HYPERTENSION WITH GOAL BLOOD PRESSURE LESS THAN 140/90: ICD-10-CM

## 2017-11-02 DIAGNOSIS — I25.10 CORONARY ARTERY DISEASE INVOLVING NATIVE CORONARY ARTERY OF NATIVE HEART WITHOUT ANGINA PECTORIS: ICD-10-CM

## 2017-11-03 RX ORDER — CARVEDILOL 6.25 MG/1
TABLET ORAL
Qty: 180 TABLET | Refills: 1 | Status: SHIPPED | OUTPATIENT
Start: 2017-11-03 | End: 2018-02-02

## 2017-11-03 RX ORDER — LOSARTAN POTASSIUM 50 MG/1
TABLET ORAL
Qty: 90 TABLET | Refills: 1 | Status: SHIPPED | OUTPATIENT
Start: 2017-11-03 | End: 2018-02-02

## 2017-11-07 DIAGNOSIS — G47.00 PERSISTENT INSOMNIA: ICD-10-CM

## 2017-11-07 RX ORDER — ZOLPIDEM TARTRATE 5 MG/1
TABLET ORAL
Qty: 30 TABLET | Refills: 5 | Status: SHIPPED | OUTPATIENT
Start: 2017-11-07 | End: 2018-02-02

## 2017-11-07 NOTE — TELEPHONE ENCOUNTER
Requested Prescriptions   Pending Prescriptions Disp Refills     zolpidem (AMBIEN) 5 MG tablet [Pharmacy Med Name: ZOLPIDEM TARTRATE 5 MG TABLET] 30 tablet 0     Sig: TAKE 1 TABLET BY MOUTH NIGHTLY AS NEEDED FOR SLEEP    There is no refill protocol information for this order        6/26/2017            Francisco Faarax  [facility]

## 2017-11-07 NOTE — TELEPHONE ENCOUNTER
Ambien      Last Written Prescription Date: 10/04/17  Last Fill Quantity: 30,  # refills: 0   Last Office Visit with [facility], [facility] or [facility] prescribing provider: 06/26/17    Routing refill request to provider for review/approval because:  Drug not on the [facility] refill protocol   Kristen Olsen RN

## 2017-12-19 ENCOUNTER — TELEPHONE (OUTPATIENT)
Dept: FAMILY MEDICINE | Facility: CLINIC | Age: 67
End: 2017-12-19

## 2017-12-19 NOTE — LETTER
December 19, 2017      Joseph Cardona  [address]  [place] [address]        Dear Joseph,     At [facility] we care about your health and are committed to providing quality patient care. Which includes staying current on preventive cancer screenings.  You can increase your chances of finding and treating cancers through regular screenings.      Our records indicate you may be due for the following preventive screening(s):    Colonoscopy  Colonoscopy is recommended every ten years for everyone age 50 and older. We strongly urge our patient's to consider having a colonoscopy done, which is the best screening test available and only needs to be done every 10 years if normal. If you are unwilling or unable to have a colonoscopy then we recommend the annual stool testing for blood. This test is called a FIT test and it looks for blood in the stool.     To schedule an appointment or discuss this screening further, you may contact us by phone at the [facility] at [phone] or online through the patient portal/[employer] @ [email or web address]    If you have had any of the screenings listed above at another facility, please call us so that we may update your chart.      Your partners in health,        Quality Committee at [facility]/[employer]

## 2017-12-19 NOTE — TELEPHONE ENCOUNTER
Panel Management Review      BP Readings from Last 1 Encounters:   07/20/17 155/81    ,   Lab Results   Component Value Date    A1C 5.6 06/26/2017    A1C 5.4 07/14/2016   , 6/27/2017  Last Office Visit with this department: 6/27/2017    Fail List measure: Colon cancer screening      Patient is due/failing the following:   COLONOSCOPY    Action needed:   Schedule colonoscopy    Type of outreach:    Sent letter.    Questions for provider review:    None                                                                                                                                    Shun Dodson      Chart routed to NA .

## 2018-01-28 DIAGNOSIS — K21.9 GASTROESOPHAGEAL REFLUX DISEASE WITHOUT ESOPHAGITIS: ICD-10-CM

## 2018-01-28 NOTE — TELEPHONE ENCOUNTER
"Requested Prescriptions   Pending Prescriptions Disp Refills     ranitidine (ZANTAC) 150 MG tablet [Pharmacy Med Name: RANITIDINE 150 MG TABLET] 60 tablet 2    Last Written Prescription Date:  9/5/17  Last Fill Quantity: 60,  # refills: 6   Last Office Visit with [facility], [facility] or [facility] prescribing provider:  5/8/2017     Future Office Visit:      Sig: TAKE 1 TABLET BY MOUTH 2 TIMES DAILY AS NEEDED FOR HEARTBURN    H2 Blockers Protocol Passed    1/28/2018  1:11 AM       Passed - Patient is age 12 or older       Passed - Recent or future visit with authorizing provider's specialty    Patient had office visit in the last year or has a visit in the next 30 days with authorizing provider.  See \"Patient Info\" tab in inbasket, or \"Choose Columns\" in Meds & Orders section of the refill encounter.               "

## 2018-02-02 ENCOUNTER — OFFICE VISIT (OUTPATIENT)
Dept: FAMILY MEDICINE | Facility: CLINIC | Age: 68
End: 2018-02-02
Payer: COMMERCIAL

## 2018-02-02 VITALS
SYSTOLIC BLOOD PRESSURE: 138 MMHG | OXYGEN SATURATION: 95 % | TEMPERATURE: 96.6 F | HEIGHT: 70 IN | HEART RATE: 85 BPM | WEIGHT: 188 LBS | DIASTOLIC BLOOD PRESSURE: 74 MMHG | BODY MASS INDEX: 26.92 KG/M2

## 2018-02-02 DIAGNOSIS — K21.9 GASTROESOPHAGEAL REFLUX DISEASE, ESOPHAGITIS PRESENCE NOT SPECIFIED: ICD-10-CM

## 2018-02-02 DIAGNOSIS — J44.9 COPD, MILD (H): ICD-10-CM

## 2018-02-02 DIAGNOSIS — I10 HYPERTENSION GOAL BP (BLOOD PRESSURE) < 140/90: ICD-10-CM

## 2018-02-02 DIAGNOSIS — Z12.11 SCREEN FOR COLON CANCER: ICD-10-CM

## 2018-02-02 DIAGNOSIS — E78.5 HYPERLIPIDEMIA LDL GOAL <70: ICD-10-CM

## 2018-02-02 DIAGNOSIS — I10 ESSENTIAL HYPERTENSION WITH GOAL BLOOD PRESSURE LESS THAN 140/90: ICD-10-CM

## 2018-02-02 DIAGNOSIS — K21.9 GASTROESOPHAGEAL REFLUX DISEASE WITHOUT ESOPHAGITIS: ICD-10-CM

## 2018-02-02 DIAGNOSIS — G47.00 PERSISTENT INSOMNIA: ICD-10-CM

## 2018-02-02 DIAGNOSIS — I25.118 CORONARY ARTERY DISEASE OF NATIVE HEART WITH STABLE ANGINA PECTORIS, UNSPECIFIED VESSEL OR LESION TYPE (H): Primary | ICD-10-CM

## 2018-02-02 LAB
ALBUMIN SERPL-MCNC: 3.9 G/DL (ref 3.4–5)
ALP SERPL-CCNC: 68 U/L (ref 40–150)
ALT SERPL W P-5'-P-CCNC: 17 U/L (ref 0–70)
ANION GAP SERPL CALCULATED.3IONS-SCNC: 8 MMOL/L (ref 3–14)
AST SERPL W P-5'-P-CCNC: 13 U/L (ref 0–45)
BASOPHILS # BLD AUTO: 0 10E9/L (ref 0–0.2)
BASOPHILS NFR BLD AUTO: 0.4 %
BILIRUB SERPL-MCNC: 1.2 MG/DL (ref 0.2–1.3)
BUN SERPL-MCNC: 22 MG/DL (ref 7–30)
CALCIUM SERPL-MCNC: 9.1 MG/DL (ref 8.5–10.1)
CHLORIDE SERPL-SCNC: 103 MMOL/L (ref 94–109)
CHOLEST SERPL-MCNC: 119 MG/DL
CO2 SERPL-SCNC: 28 MMOL/L (ref 20–32)
CREAT SERPL-MCNC: 1.13 MG/DL (ref 0.66–1.25)
CREAT UR-MCNC: 57 MG/DL
DIFFERENTIAL METHOD BLD: NORMAL
EOSINOPHIL # BLD AUTO: 0.3 10E9/L (ref 0–0.7)
EOSINOPHIL NFR BLD AUTO: 3.3 %
ERYTHROCYTE [DISTWIDTH] IN BLOOD BY AUTOMATED COUNT: 12.8 % (ref 10–15)
GFR SERPL CREATININE-BSD FRML MDRD: 64 ML/MIN/1.7M2
GLUCOSE SERPL-MCNC: 99 MG/DL (ref 70–99)
HCT VFR BLD AUTO: 47.3 % (ref 40–53)
HDLC SERPL-MCNC: 39 MG/DL
HGB BLD-MCNC: 15.9 G/DL (ref 13.3–17.7)
LDLC SERPL CALC-MCNC: 66 MG/DL
LYMPHOCYTES # BLD AUTO: 1.3 10E9/L (ref 0.8–5.3)
LYMPHOCYTES NFR BLD AUTO: 16.4 %
MCH RBC QN AUTO: 31.2 PG (ref 26.5–33)
MCHC RBC AUTO-ENTMCNC: 33.6 G/DL (ref 31.5–36.5)
MCV RBC AUTO: 93 FL (ref 78–100)
MICROALBUMIN UR-MCNC: 7 MG/L
MICROALBUMIN/CREAT UR: 11.63 MG/G CR (ref 0–17)
MONOCYTES # BLD AUTO: 0.8 10E9/L (ref 0–1.3)
MONOCYTES NFR BLD AUTO: 10.5 %
NEUTROPHILS # BLD AUTO: 5.4 10E9/L (ref 1.6–8.3)
NEUTROPHILS NFR BLD AUTO: 69.4 %
NONHDLC SERPL-MCNC: 80 MG/DL
PLATELET # BLD AUTO: 233 10E9/L (ref 150–450)
POTASSIUM SERPL-SCNC: 4.5 MMOL/L (ref 3.4–5.3)
PROT SERPL-MCNC: 7.4 G/DL (ref 6.8–8.8)
RBC # BLD AUTO: 5.09 10E12/L (ref 4.4–5.9)
SODIUM SERPL-SCNC: 139 MMOL/L (ref 133–144)
TRIGL SERPL-MCNC: 72 MG/DL
WBC # BLD AUTO: 7.8 10E9/L (ref 4–11)

## 2018-02-02 PROCEDURE — 36415 COLL VENOUS BLD VENIPUNCTURE: CPT | Performed by: INTERNAL MEDICINE

## 2018-02-02 PROCEDURE — 82043 UR ALBUMIN QUANTITATIVE: CPT | Performed by: INTERNAL MEDICINE

## 2018-02-02 PROCEDURE — 85025 COMPLETE CBC W/AUTO DIFF WBC: CPT | Performed by: INTERNAL MEDICINE

## 2018-02-02 PROCEDURE — 93000 ELECTROCARDIOGRAM COMPLETE: CPT | Performed by: INTERNAL MEDICINE

## 2018-02-02 PROCEDURE — 80061 LIPID PANEL: CPT | Performed by: INTERNAL MEDICINE

## 2018-02-02 PROCEDURE — 99214 OFFICE O/P EST MOD 30 MIN: CPT | Performed by: INTERNAL MEDICINE

## 2018-02-02 PROCEDURE — 80053 COMPREHEN METABOLIC PANEL: CPT | Performed by: INTERNAL MEDICINE

## 2018-02-02 RX ORDER — CARVEDILOL 6.25 MG/1
TABLET ORAL
Qty: 180 TABLET | Refills: 3 | Status: SHIPPED | OUTPATIENT
Start: 2018-02-02 | End: 2018-11-12

## 2018-02-02 RX ORDER — NITROGLYCERIN 0.4 MG/1
TABLET SUBLINGUAL
Qty: 25 TABLET | Refills: 1 | Status: SHIPPED | OUTPATIENT
Start: 2018-02-02 | End: 2020-02-05

## 2018-02-02 RX ORDER — PANTOPRAZOLE SODIUM 20 MG/1
TABLET, DELAYED RELEASE ORAL
Qty: 90 TABLET | Refills: 3 | Status: SHIPPED | OUTPATIENT
Start: 2018-02-02 | End: 2018-02-19 | Stop reason: ALTCHOICE

## 2018-02-02 RX ORDER — FUROSEMIDE 20 MG
20 TABLET ORAL DAILY
Qty: 90 TABLET | Refills: 3 | Status: SHIPPED | OUTPATIENT
Start: 2018-02-02 | End: 2019-01-19

## 2018-02-02 RX ORDER — LOSARTAN POTASSIUM 50 MG/1
50 TABLET ORAL DAILY
Qty: 90 TABLET | Refills: 3 | Status: SHIPPED | OUTPATIENT
Start: 2018-02-02 | End: 2018-11-12

## 2018-02-02 RX ORDER — ATORVASTATIN CALCIUM 40 MG/1
40 TABLET, FILM COATED ORAL DAILY
Qty: 90 TABLET | Refills: 3 | Status: SHIPPED | OUTPATIENT
Start: 2018-02-02 | End: 2019-05-31

## 2018-02-02 RX ORDER — CLOPIDOGREL BISULFATE 75 MG/1
75 TABLET ORAL DAILY
Qty: 90 TABLET | Refills: 3 | Status: SHIPPED | OUTPATIENT
Start: 2018-02-02 | End: 2019-03-26

## 2018-02-02 RX ORDER — ZOLPIDEM TARTRATE 5 MG/1
5 TABLET ORAL
Qty: 30 TABLET | Refills: 5 | Status: SHIPPED | OUTPATIENT
Start: 2018-02-02 | End: 2018-09-01

## 2018-02-02 ASSESSMENT — PAIN SCALES - GENERAL: PAINLEVEL: NO PAIN (0)

## 2018-02-02 NOTE — PATIENT INSTRUCTIONS
At [facility], we strive to deliver an exceptional experience to you, every time we see you.  If you receive a survey in the mail, please send us back your thoughts. We really do value your feedback.    Based on your medical history, these are the current health maintenance/preventive care services that you are due for (some may have been done at this visit.)  Health Maintenance Due   Topic Date Due     EYE EXAM Q1 YEAR  02/16/1951     MICROALBUMIN Q1 YEAR  07/14/2017     FOOT EXAM Q1 YEAR  07/29/2017     TSH W/ FREE T4 REFLEX Q2 YEAR  11/17/2017     A1C Q6 MO  12/26/2017         Suggested websites for health information:  Www.[facility].org : Up to date and easily searchable information on multiple topics.  Www.medlineplus.gov : medication info, interactive tutorials, watch real surgeries online  Www.familydoctor.org : good info from the Academy of Family Physicians  Www.cdc.gov : public health info, travel advisories, epidemics (H1N1)  Www.aap.org : children's health info, normal development, vaccinations  Www.health.[facility].[place].us : [place] dept of health, public health issues in [place], N1N1    Your care team:                            Family Medicine Internal Medicine   MD Kyle Mario MD Shantel Branch-Fleming, MD Katya Georgiev PA-C Nam Ho, MD Pediatrics   RONI Ricci, MD Rohini Lanza CNP, MD Deborah Mielke, MD Kim Thein, APRN [facility]      Clinic hours: Monday - Thursday 7 am-7 pm; Fridays 7 am-5 pm.   Urgent care: Monday - Friday 11 am-9 pm; Saturday and Sunday 9 am-5 pm.  Pharmacy : Monday -Thursday 8 am-8 pm; Friday 8 am-6 pm; Saturday and Sunday 9 am-5 pm.     Clinic: [phone]   Pharmacy: [phone]

## 2018-02-02 NOTE — PROGRESS NOTES
SUBJECTIVE:   Joseph Cardona is a 67 year old male who presents to clinic today for the following health issues:    Medication Followup of Lipid Panel, Potassium, Creatinine    Taking Medication as prescribed: yes    Side Effects:  None    Medication Helping Symptoms:  Yes    When shoveling snow, patient developed CP.       Problem list and histories reviewed & adjusted, as indicated.  Additional history: as documented    Patient Active Problem List   Diagnosis     Hyperlipidemia LDL goal <70     Essential hypertension     Advanced directives, counseling/discussion     COPD, mild (H)     Tinea pedis of left foot     Coronary artery disease involving native coronary artery of native heart without angina pectoris     Positive hepatitis C antibody test     Tobacco dependence syndrome     Paroxysmal atrial fibrillation (H)     Intermittent claudication (H)     Peripheral vascular disease (H)     S/P insertion of iliac artery stent     Past Surgical History:   Procedure Laterality Date     CARDIAC SURGERY      Bypass double and quadruple     EYE SURGERY      cataract surgery     HEAD & NECK SURGERY      Caratid surgery     VASCULAR SURGERY         Social History   Substance Use Topics     Smoking status: Former Smoker     Smokeless tobacco: Never Used      Comment: quit 2 years ago     Alcohol use 0.0 oz/week     0 Standard drinks or equivalent per week     History reviewed. No pertinent family history.      Allergies   Allergen Reactions     Lisinopril Cough     Recent Labs   Lab Test  02/02/18   1130  06/26/17   1154   11/08/16   1139  07/14/16   1043  02/09/16   1157  11/17/15   1212   A1C   --   5.6   --    --   5.4  5.6   --    LDL  66  58   --   78   --    --    --    HDL  39*  36*   --   36*   --    --    --    TRIG  72  77   --   50   --    --    --    ALT  17  15   --   17   --   19   --    CR  1.13  1.03   < >  0.97   --   1.15   --    GFRESTIMATED  64  72   < >  77   --   63   --    GFRESTBLACK  78  87    "< >  >90   GFR Calc     --   77   --    POTASSIUM  4.5  4.6   --   4.4   --   4.1   --    TSH   --    --    --    --    --    --   4.71*    < > = values in this interval not displayed.      BP Readings from Last 3 Encounters:   02/02/18 138/74   07/20/17 155/81   06/26/17 135/70    Wt Readings from Last 3 Encounters:   02/02/18 188 lb (85.3 kg)   07/20/17 186 lb 8 oz (84.6 kg)   06/26/17 186 lb 8 oz (84.6 kg)                    ROS:  C: NEGATIVE for fever, chills, change in weight  I: NEGATIVE for worrisome rashes, moles or lesions  E: NEGATIVE for vision changes or irritation  E/M: NEGATIVE for ear, mouth and throat problems  R: NEGATIVE for significant cough or SOB  CV: NEGATIVE for chest pain, palpitations or peripheral edema  GI: NEGATIVE for nausea, abdominal pain, heartburn, or change in bowel habits  : NEGATIVE for frequency, dysuria, or hematuria  M: NEGATIVE for significant arthralgias or myalgia  N: NEGATIVE for weakness, dizziness or paresthesias  E: NEGATIVE for temperature intolerance, skin/hair changes  H: NEGATIVE for bleeding problems  P: NEGATIVE for changes in mood or affect    OBJECTIVE:     /74 (BP Location: Right arm, Patient Position: Chair, Cuff Size: Adult Regular)  Pulse 85  Temp 96.6  F (35.9  C) (Oral)  Ht 5' 10\" (1.778 m)  Wt 188 lb (85.3 kg)  SpO2 95%  BMI 26.98 kg/m2  Body mass index is 26.98 kg/(m^2).  GENERAL: healthy, alert and no distress  EYES: Eyes grossly normal to inspection, PERRL and conjunctivae and sclerae normal  HENT: ear canals and TM's normal, nose and mouth without ulcers or lesions  NECK: no adenopathy, no asymmetry, masses, or scars and thyroid normal to palpation  RESP: lungs clear to auscultation - no rales, rhonchi or wheezes  CV: regular rate and rhythm, normal S1 S2, no S3 or S4, no murmur, click or rub, no peripheral edema and peripheral pulses strong  ABDOMEN: soft, nontender, no hepatosplenomegaly, no masses and bowel sounds " normal  MS: no gross musculoskeletal defects noted, no edema  SKIN: no suspicious lesions or rashes  NEURO: Normal strength and tone, mentation intact and speech normal  PSYCH: mentation appears normal, affect normal/bright    Diagnostic Test Results:  Results for orders placed or performed in visit on 02/02/18   Lipid Profile   Result Value Ref Range    Cholesterol 119 <200 mg/dL    Triglycerides 72 <150 mg/dL    HDL Cholesterol 39 (L) >39 mg/dL    LDL Cholesterol Calculated 66 <100 mg/dL    Non HDL Cholesterol 80 <130 mg/dL   Albumin Random Urine Quantitative with Creat Ratio   Result Value Ref Range    Creatinine Urine 57 mg/dL    Albumin Urine mg/L 7 mg/L    Albumin Urine mg/g Cr 11.63 0 - 17 mg/g Cr   CBC with platelets and differential   Result Value Ref Range    WBC 7.8 4.0 - 11.0 10e9/L    RBC Count 5.09 4.4 - 5.9 10e12/L    Hemoglobin 15.9 13.3 - 17.7 g/dL    Hematocrit 47.3 40.0 - 53.0 %    MCV 93 78 - 100 fl    MCH 31.2 26.5 - 33.0 pg    MCHC 33.6 31.5 - 36.5 g/dL    RDW 12.8 10.0 - 15.0 %    Platelet Count 233 150 - 450 10e9/L    Diff Method Automated Method     % Neutrophils 69.4 %    % Lymphocytes 16.4 %    % Monocytes 10.5 %    % Eosinophils 3.3 %    % Basophils 0.4 %    Absolute Neutrophil 5.4 1.6 - 8.3 10e9/L    Absolute Lymphocytes 1.3 0.8 - 5.3 10e9/L    Absolute Monocytes 0.8 0.0 - 1.3 10e9/L    Absolute Eosinophils 0.3 0.0 - 0.7 10e9/L    Absolute Basophils 0.0 0.0 - 0.2 10e9/L   Comprehensive metabolic panel (BMP + Alb, Alk Phos, ALT, AST, Total. Bili, TP)   Result Value Ref Range    Sodium 139 133 - 144 mmol/L    Potassium 4.5 3.4 - 5.3 mmol/L    Chloride 103 94 - 109 mmol/L    Carbon Dioxide 28 20 - 32 mmol/L    Anion Gap 8 3 - 14 mmol/L    Glucose 99 70 - 99 mg/dL    Urea Nitrogen 22 7 - 30 mg/dL    Creatinine 1.13 0.66 - 1.25 mg/dL    GFR Estimate 64 >60 mL/min/1.7m2    GFR Estimate If Black 78 >60 mL/min/1.7m2    Calcium 9.1 8.5 - 10.1 mg/dL    Bilirubin Total 1.2 0.2 - 1.3 mg/dL     Albumin 3.9 3.4 - 5.0 g/dL    Protein Total 7.4 6.8 - 8.8 g/dL    Alkaline Phosphatase 68 40 - 150 U/L    ALT 17 0 - 70 U/L    AST 13 0 - 45 U/L       ASSESSMENT/PLAN:     (I25.118) Coronary artery disease of native heart with stable angina pectoris, unspecified vessel or lesion type (H)  (primary encounter diagnosis)  Comment:   Plan: EKG 12-lead complete w/read - Clinics,         Echocardiogram Complete, carvedilol (COREG)         6.25 MG tablet, nitroGLYcerin (NITROSTAT) 0.4         MG sublingual tablet, atorvastatin (LIPITOR) 40        MG tablet, clopidogrel (PLAVIX) 75 MG tablet            (E78.5) Hyperlipidemia LDL goal <70  Comment:   Plan: Lipid Profile            (J44.9) COPD, mild (H)  Comment:   Plan: CBC with platelets and differential,         umeclidinium (INCRUSE ELLIPTA) 62.5 MCG/INH         oral inhaler            (I10) Hypertension goal BP (blood pressure) < 140/90  Comment:   Plan: Albumin Random Urine Quantitative with Creat         Ratio, Comprehensive metabolic panel (BMP +         Alb, Alk Phos, ALT, AST, Total. Bili, TP)            (K21.9) Gastroesophageal reflux disease without esophagitis  Comment:   Plan: ranitidine (ZANTAC) 150 MG tablet            (I10) Essential hypertension with goal blood pressure less than 140/90  Comment:   Plan: losartan (COZAAR) 50 MG tablet, carvedilol         (COREG) 6.25 MG tablet, furosemide (LASIX) 20         MG tablet            (G47.00) Persistent insomnia  Comment:   Plan: zolpidem (AMBIEN) 5 MG tablet            (K21.9) Gastroesophageal reflux disease, esophagitis presence not specified  Comment:   Plan: pantoprazole (PROTONIX) 20 MG EC tablet            (Z12.11) Screen for colon cancer  Comment:   Plan: Fecal colorectal cancer screen (FIT)            Follow-up visit if condition worsens.    Kyle López MD  [facility]

## 2018-02-02 NOTE — MR AVS SNAPSHOT
After Visit Summary   2/2/2018    Joseph Cardona    MRN: [id number]           Patient Information     Date Of Birth          1950        Visit Information        Provider Department      2/2/2018 10:40 AM Kyle López MD [facility]        Today's Diagnoses     Coronary artery disease of native heart with stable angina pectoris, unspecified vessel or lesion type (H)    -  1    Hyperlipidemia LDL goal <70        COPD, mild (H)        Hypertension goal BP (blood pressure) < 140/90        Gastroesophageal reflux disease without esophagitis        Essential hypertension with goal blood pressure less than 140/90        Persistent insomnia        Gastroesophageal reflux disease, esophagitis presence not specified          Care Instructions    At [facility], we strive to deliver an exceptional experience to you, every time we see you.  If you receive a survey in the mail, please send us back your thoughts. We really do value your feedback.    Based on your medical history, these are the current health maintenance/preventive care services that you are due for (some may have been done at this visit.)  Health Maintenance Due   Topic Date Due     EYE EXAM Q1 YEAR  02/16/1951     MICROALBUMIN Q1 YEAR  07/14/2017     FOOT EXAM Q1 YEAR  07/29/2017     TSH W/ FREE T4 REFLEX Q2 YEAR  11/17/2017     A1C Q6 MO  12/26/2017         Suggested websites for health information:  Www.[employer].org : Up to date and easily searchable information on multiple topics.  Www.medlineplus.gov : medication info, interactive tutorials, watch real surgeries online  Www.familydoctor.org : good info from the Academy of Family Physicians  Www.cdc.gov : public health info, travel advisories, epidemics (H1N1)  Www.aap.org : children's health info, normal development, vaccinations  Www.health.state.[place].us : [place] dept of health, public health issues in [place], N1N1    Your care team:              "               Family Medicine Internal Medicine   MD Kyle Mario MD Shantel Branch-Fleming, MD Katya Georgiev PA-C Nam Ho, MD Pediatrics   RONI Ricci, MARLENE George APRMD Rohini Quigley CNP, MD Deborah Mielke, MD Kim Thein, APRN [facility]      Clinic hours: Monday - Thursday 7 am-7 pm; Fridays 7 am-5 pm.   Urgent care: Monday - Friday 11 am-9 pm; Saturday and Sunday 9 am-5 pm.  Pharmacy : Monday -Thursday 8 am-8 pm; Friday 8 am-6 pm; Saturday and Sunday 9 am-5 pm.     Clinic: [phone]   Pharmacy: [phone]            Follow-ups after your visit        Future tests that were ordered for you today     Open Future Orders        Priority Expected Expires Ordered    Echocardiogram Complete Routine  2/2/2019 2/2/2018            Who to contact     If you have questions or need follow up information about today's clinic visit or your schedule please contact [facility] directly at [phone].  Normal or non-critical lab and imaging results will be communicated to you by MyChart, letter or phone within 4 business days after the clinic has received the results. If you do not hear from us within 7 days, please contact the clinic through MyChart or phone. If you have a critical or abnormal lab result, we will notify you by phone as soon as possible.  Submit refill requests through [employer] or call your pharmacy and they will forward the refill request to us. Please allow 3 business days for your refill to be completed.          Additional Information About Your Visit        [email or web address] Information     Obsorb lets you send messages to your doctor, view your test results, renew your prescriptions, schedule appointments and more. To sign up, go to [email or web address] . Click on \"Log in\" on the left side of the screen, which will take you to the Welcome page. Then click on \"Sign up Now\" on the right side of the page.     You " "will be asked to enter the access code listed below, as well as some personal information. Please follow the directions to create your username and password.     Your access code is: [email or web address]  Expires: 5/3/2018 11:23 AM     Your access code will  in 90 days. If you need help or a new code, please call your [place] clinic or [phone].        Care EveryWhere ID     This is your Care EveryWhere ID. This could be used by other organizations to access your [place] medical records  [id number]        Your Vitals Were     Pulse Temperature Height Pulse Oximetry BMI (Body Mass Index)       85 96.6  F (35.9  C) (Oral) 5' 10\" (1.778 m) 95% 26.98 kg/m2        Blood Pressure from Last 3 Encounters:   18 138/74   17 155/81   17 135/70    Weight from Last 3 Encounters:   18 188 lb (85.3 kg)   17 186 lb 8 oz (84.6 kg)   17 186 lb 8 oz (84.6 kg)              We Performed the Following     Albumin Random Urine Quantitative with Creat Ratio     CBC with platelets and differential     Comprehensive metabolic panel (BMP + Alb, Alk Phos, ALT, AST, Total. Bili, TP)     EKG 12-lead complete w/read - Clinics     Lipid Profile          Today's Medication Changes          These changes are accurate as of 18 11:23 AM.  If you have any questions, ask your nurse or doctor.               Start taking these medicines.        Dose/Directions    umeclidinium 62.5 MCG/INH oral inhaler   Commonly known as:  INCRUSE ELLIPTA   Used for:  COPD, mild (H)   Started by:  Kyle López MD        Dose:  1 puff   Inhale 1 puff into the lungs daily   Quantity:  1 Inhaler   Refills:  11         These medicines have changed or have updated prescriptions.        Dose/Directions    carvedilol 6.25 MG tablet   Commonly known as:  COREG   This may have changed:  See the new instructions.   Used for:  Essential hypertension with goal blood pressure less than 140/90, Coronary artery disease of native " heart with stable angina pectoris, unspecified vessel or lesion type (H)   Changed by:  Kyle López MD        TAKE 1 TABLET BY MOUTH TWO TIMES DAILY WITH MEALS   Quantity:  180 tablet   Refills:  3       furosemide 20 MG tablet   Commonly known as:  LASIX   This may have changed:  medication strength   Used for:  Essential hypertension with goal blood pressure less than 140/90   Changed by:  Kyle López MD        Dose:  20 mg   Take 1 tablet (20 mg) by mouth daily   Quantity:  90 tablet   Refills:  3       losartan 50 MG tablet   Commonly known as:  COZAAR   This may have changed:  See the new instructions.   Used for:  Essential hypertension with goal blood pressure less than 140/90   Changed by:  Kyle López MD        Dose:  50 mg   Take 1 tablet (50 mg) by mouth daily   Quantity:  90 tablet   Refills:  3       nitroGLYcerin 0.4 MG sublingual tablet   Commonly known as:  NITROSTAT   This may have changed:  See the new instructions.   Used for:  Coronary artery disease of native heart with stable angina pectoris, unspecified vessel or lesion type (H)   Changed by:  Kyle López MD        PLACE 1 TABLET UNDER THE TONGUE EVERY 5 MINUTES AS NEEDED FOR CHEST PAIN.   Quantity:  25 tablet   Refills:  1       pantoprazole 20 MG EC tablet   Commonly known as:  PROTONIX   This may have changed:  See the new instructions.   Used for:  Gastroesophageal reflux disease, esophagitis presence not specified   Changed by:  Kyle López MD        TAKE 1 TABLET BY MOUTH DAILY 30 MINUTES BEFORE BREAKFAST.   Quantity:  90 tablet   Refills:  3       ranitidine 150 MG tablet   Commonly known as:  ZANTAC   This may have changed:  See the new instructions.   Used for:  Gastroesophageal reflux disease without esophagitis   Changed by:  Kyle López MD        Dose:  150 mg   Take 1 tablet (150 mg) by mouth 2 times daily   Quantity:  180 tablet   Refills:  3       zolpidem 5 MG tablet    Commonly known as:  AMBIEN   This may have changed:  See the new instructions.   Used for:  Persistent insomnia   Changed by:  Kyle López MD        Dose:  5 mg   Take 1 tablet (5 mg) by mouth nightly as needed   Quantity:  30 tablet   Refills:  5         Stop taking these medicines if you haven't already. Please contact your care team if you have questions.     SPIRIVA HANDIHALER 18 MCG capsule   Generic drug:  tiotropium   Stopped by:  Kyle López MD                Where to get your medicines      These medications were sent to [facility] [address] [address] [facility] [address] [facility]  [address] [facility] [address]     Phone:  [phone]     atorvastatin 40 MG tablet    carvedilol 6.25 MG tablet    clopidogrel 75 MG tablet    furosemide 20 MG tablet    losartan 50 MG tablet    nitroGLYcerin 0.4 MG sublingual tablet    pantoprazole 20 MG EC tablet    ranitidine 150 MG tablet    umeclidinium 62.5 MCG/INH oral inhaler         Some of these will need a paper prescription and others can be bought over the counter.  Ask your nurse if you have questions.     Bring a paper prescription for each of these medications     zolpidem 5 MG tablet                Primary Care Provider Office Phone # Fax #    Kyle López [phone]       [address]  [facility] [address]        Equal Access to Services     ARLENE NORRIS AH: Hadii myrtle ku hadasho Soomaali, waaxda luqadaha, qaybta kaalmada adeegyada, paul ayon hayallynn jania lutz . So [facility] [phone].    ATENCIÓN: Si habla español, tiene a murry disposición servicios gratuitos de asistencia lingüística. Caitlin al [phone].    We comply with applicable federal civil rights laws and [place] laws. We do not discriminate on the basis of race, color, national origin, age, disability, sex, sexual orientation, or gender identity.            Thank you!     Thank you for choosing [facility]  [facility]  for your care. Our goal is always to provide you with excellent care. Hearing back from our patients is one way we can continue to improve our services. Please take a few minutes to complete the written survey that you may receive in the mail after your visit with us. Thank you!             Your Updated Medication List - Protect others around you: Learn how to safely use, store and throw away your medicines at www.disposemymeds.org.          This list is accurate as of 2/2/18 11:23 AM.  Always use your most recent med list.                   Brand Name Dispense Instructions for use Diagnosis    albuterol 108 (90 BASE) MCG/ACT Inhaler    VENTOLIN HFA    1 Inhaler    Inhale 2 puffs into the lungs every 4 hours as needed for shortness of breath / dyspnea or wheezing    COPD, mild (H)       aspirin 81 MG chewable tablet      81 mg daily        atorvastatin 40 MG tablet    LIPITOR    90 tablet    Take 1 tablet (40 mg) by mouth daily    Coronary artery disease of native heart with stable angina pectoris, unspecified vessel or lesion type (H)       carvedilol 6.25 MG tablet    COREG    180 tablet    TAKE 1 TABLET BY MOUTH TWO TIMES DAILY WITH MEALS    Essential hypertension with goal blood pressure less than 140/90, Coronary artery disease of native heart with stable angina pectoris, unspecified vessel or lesion type (H)       clopidogrel 75 MG tablet    PLAVIX    90 tablet    Take 1 tablet (75 mg) by mouth daily    Coronary artery disease of native heart with stable angina pectoris, unspecified vessel or lesion type (H)       furosemide 20 MG tablet    LASIX    90 tablet    Take 1 tablet (20 mg) by mouth daily    Essential hypertension with goal blood pressure less than 140/90       losartan 50 MG tablet    COZAAR    90 tablet    Take 1 tablet (50 mg) by mouth daily    Essential hypertension with goal blood pressure less than 140/90       naproxen 375 MG tablet    NAPROSYN    60 tablet    Take 1 tablet (375 mg) by  mouth 2 times daily as needed for moderate pain Take with meals only.    Achilles tendinitis of right lower extremity       nitroGLYcerin 0.4 MG sublingual tablet    NITROSTAT    25 tablet    PLACE 1 TABLET UNDER THE TONGUE EVERY 5 MINUTES AS NEEDED FOR CHEST PAIN.    Coronary artery disease of native heart with stable angina pectoris, unspecified vessel or lesion type (H)       pantoprazole 20 MG EC tablet    PROTONIX    90 tablet    TAKE 1 TABLET BY MOUTH DAILY 30 MINUTES BEFORE BREAKFAST.    Gastroesophageal reflux disease, esophagitis presence not specified       ranitidine 150 MG tablet    ZANTAC    180 tablet    Take 1 tablet (150 mg) by mouth 2 times daily    Gastroesophageal reflux disease without esophagitis       umeclidinium 62.5 MCG/INH oral inhaler    INCRUSE ELLIPTA    1 Inhaler    Inhale 1 puff into the lungs daily    COPD, mild (H)       zolpidem 5 MG tablet    AMBIEN    30 tablet    Take 1 tablet (5 mg) by mouth nightly as needed    Persistent insomnia

## 2018-02-19 ENCOUNTER — OFFICE VISIT (OUTPATIENT)
Dept: PHARMACY | Facility: CLINIC | Age: 68
End: 2018-02-19
Payer: COMMERCIAL

## 2018-02-19 VITALS — SYSTOLIC BLOOD PRESSURE: 154 MMHG | DIASTOLIC BLOOD PRESSURE: 80 MMHG

## 2018-02-19 DIAGNOSIS — K21.9 GASTROESOPHAGEAL REFLUX DISEASE WITHOUT ESOPHAGITIS: ICD-10-CM

## 2018-02-19 DIAGNOSIS — I25.810 ATHEROSCLEROSIS OF CORONARY ARTERY BYPASS GRAFT OF NATIVE HEART WITHOUT ANGINA PECTORIS: Primary | ICD-10-CM

## 2018-02-19 DIAGNOSIS — J44.9 COPD, MILD (H): ICD-10-CM

## 2018-02-19 DIAGNOSIS — I10 ESSENTIAL HYPERTENSION: ICD-10-CM

## 2018-02-19 DIAGNOSIS — I73.9 PERIPHERAL VASCULAR DISEASE (H): ICD-10-CM

## 2018-02-19 DIAGNOSIS — E78.5 HYPERLIPIDEMIA LDL GOAL <70: ICD-10-CM

## 2018-02-19 DIAGNOSIS — I25.10 CORONARY ARTERY DISEASE INVOLVING NATIVE CORONARY ARTERY OF NATIVE HEART WITHOUT ANGINA PECTORIS: Primary | ICD-10-CM

## 2018-02-19 DIAGNOSIS — G47.00 INSOMNIA, UNSPECIFIED TYPE: ICD-10-CM

## 2018-02-19 PROCEDURE — 99605 MTMS BY PHARM NP 15 MIN: CPT | Performed by: PHARMACIST

## 2018-02-19 PROCEDURE — 99607 MTMS BY PHARM ADDL 15 MIN: CPT | Performed by: PHARMACIST

## 2018-02-19 NOTE — Clinical Note
"He would like to go to [facility] to have the Echo done (and tried to show up to have it done there once already) - I pended the Echo order for you to change the location to \"external\", not sure what the workflow is to notify pt/[facility] of the order?   Thanks Natalya Stephenson, PharmD, BCACP  "

## 2018-02-19 NOTE — PATIENT INSTRUCTIONS
Recommendations from today's MTM visit:                                                    MTM (medication therapy management) is a service provided by a clinical pharmacist designed to help you get the most of out of your medicines.   Today we reviewed what your medicines are for, how to know if they are working, that your medicines are safe and how to make your medicine regimen as easy as possible.     1. Stop taking pantoprazole. You are taking two medicines for heartburn right now.    2. Please stop taking naproxen if you are taking it right now. That should be a short term medicine for pain. Only take as needed.    3. Try not to take zolpidem for sleep. It can affect your balance and clarity of thinking, even the next day.  You could try melatonin over the counter.     4. I will talk to Dr López about your Echo    Next MTM visit: 3 months    To schedule another MTM appointment, please call the clinic directly or you may call the MTM scheduling line at [phone] or toll-free at [phone].     My Clinical Pharmacist's contact information:                                                      It was a pleasure seeing you today!  Please feel free to contact me with any questions or concerns you have.      Natalya Stephenson, PharmD, [facility]   [phone]    You may receive a survey about the MTM services you received.  I would appreciate your feedback to help me serve you better in the future. Please fill it out and return it when you can. Your comments will be anonymous.      My healthcare goals:                                                      Cholesterol Goals:    Things you can do (in addition to taking your atorvastatin 40mg once daily) to lower your LDL cholesterol and triglycerides:    Lifestyle changes (based on research you may lower your LDL cholesterol by making the following changes):     Change LDL Reduction   Saturated Fat Decrease to <7% of calories 8-10%   Dietary Cholesterol Decrease to  "<200mg/day 3-5%   Weight  Lose 10 lbs if overweight 5-8%   Soluble Fiber  Add 5-10 grams/day 3-5%   Plant Stanols/Sterols Add 2 grams/day 5-15%   Total  20-30%   Adapted from http://www.nhlbi.nih.gov/health/public/heart/chol/chol_tlc.pdf    A few YOUTUBE Videos for some more information:    More information on plant stanols/sterols and how they work:  http://www.GeoEyeube.com/watch?v=CJ729vTI_qV    Explanation of what cholesterol is:   http://www.GeoEyeube.com/watch?v=-WvJKy8ZHsC&feature=related    The \"walking 30 minutes per day\" youtube video:  Http://www.GeoEyeube.com/watch?v=mOvZqG3ZBSa    Goals for patients with hypertension (high blood pressure):  Your blood pressure should be less than <140/90.   Today, yours was   BP Readings from Last 1 Encounters:   02/19/18 154/80   .  Things you can do to help lower your blood pressure:  1) Take your medications as directed  2) Regular exercise- Aim for at least 30 minutes of daily physical activity.  3) Weight loss if overweight- losing as little as 10 lbs. can reduce blood pressure  4) Avoid excess dietary sodium (salt).  You should not consume more than 1500mg of sodium per day.  Following this restriction can lower your blood pressure as much as adding another medication.  -Foods high in sodium include: ham, biswas, deli meats, canned soups and vegetables, frozen meals, gravy, chips, cheeses, fast food, bread.  -1 teaspoon of table salt is about 2300mg of sodium.  5) Avoid alcohol- if you drink, please do so in moderation (no more than 1 drink per day for women or 2 drinks per day for men).  6) Quit smoking- Tobacco injures vessel walls and speeds up the process of hardening of the arteries, making it harder to control your blood pressure.  7) Look for ways to reduce stress in your life.  8)  Try and get plenty of sleep.  9) Consider the DASH diet.  This diet can lower your blood pressure as much as adding another medication if followed correctly.   a. The DASH diet is high " in fruits and vegetables, whole grains, fat-free or low-fat milk products, fish and poultry, beans, seeds and nuts.  b. The DASH diet is low in salt, sodium, sugar, sweets, high-sugar drinks, red meat, saturated fat and trans fats.  c. For more information on the DASH diet, visit the National Heart, Lung and Blood [place] at http://www.nhlbi.nih.gov

## 2018-02-19 NOTE — LETTER
"     [facility] MTM     Date: 2018    Joseph Cardona  [address] [address]    Dear Mr. Cardona,    Thank you for talking with me on  about your health and medications. Medicare s MTM (Medication Therapy Management) program helps you understand your medications and use them safely.      This letter includes an action plan (Medication Action Plan) and medication list (Personal Medication List). The action plan has steps you should take to help you get the best results from your medications. The medication list will help you keep track of your medications and how to use them the right way.       Have your action plan and medication list with you when you talk with your doctors, pharmacists, and other healthcare providers in your care team.     Ask your doctors, pharmacists, and other healthcare providers to update the action plan and medication list at every visit.     Take your medication list with you if you go to the hospital or emergency room.     Give a copy of the action plan and medication list to your family or caregivers.     If you want to talk about this letter or any of the papers with it, please call   [phone].   We look forward to working with you, your doctors, and other healthcare providers to help you stay healthy.     Sincerely,    Natalya Stephenson, [facility]      Enclosed: Medication Action Plan and Personal Medication List    MEDICATION ACTION PLAN FOR Joseph Cardona,  1950     This action plan will help you get the best results from your medications if you:   1. Read \"What we talked about.\"   2. Take the steps listed in the \"What I need to do\" boxes.   3. Fill in \"What I did and when I did it.\"   4. Fill in \"My follow-up plan\" and \"Questions I want to ask.\"     Have this action plan with you when you talk with your doctors, pharmacists, and other healthcare providers in your care team. Share this with your family " or caregivers too.  DATE PREPARED: 2018  What we talked about: You are taking two medications for heartburn - pantoprazole and ranitidine.                                                  What I need to do: stop taking pantoprazole       What I did and when I did it:                                              What we talked about: zolpidem is not recommended for people over age 65 years because it can linger in your body and increase risk of falls and worsen your memory/cloud your thinking the next day                                                  What I need to do: try not to take zolpidem       What I did and when I did it:                                              My follow-up plan:                 Questions I want to ask:              If you have any questions about your action plan, call Natalya Martinez Seema, Phone: [phone] , Monday-Friday 8-4:30pm.           MEDICATION LIST FOR Joseph Cardona,  1950     This medication list was made for you after we talked. We also used information from your doctor's chart.      Use blank rows to add new medications. Then fill in the dates you started using them.    Cross out medications when you no longer use them. Then write the date and why you stopped using them.    Ask your doctors, pharmacists, and other healthcare providers to update this list at every visit. Keep this list up-to-date with:       Prescription medications    Over the counter drugs     Herbals    Vitamins    Minerals      If you go to the hospital or emergency room, take this list with you. Share this with your family or caregivers too.     DATE PREPARED: 2018  Allergies or side effects: Lisinopril     Medication:  ALBUTEROL SULFATE  (90 BASE) MCG/ACT IN AERS      How I use it:  Inhale 2 puffs into the lungs every 4 hours as needed for shortness of breath / dyspnea or wheezing      Why I use it: COPD    Prescriber:  Kyle López MD      Date I started  using it:       Date I stopped using it:         Why I stopped using it:            Medication:  ASPIRIN 81 MG PO CHEW      How I use it:  81 mg daily      Why I use it:  blood thinner    Prescriber:  Patient Reported      Date I started using it:       Date I stopped using it:         Why I stopped using it:            Medication:  ATORVASTATIN CALCIUM 40 MG PO TABS      How I use it:  Take 1 tablet (40 mg) by mouth daily      Why I use it: High cholesterol and arterial disease    Prescriber:  Kyle López MD      Date I started using it:       Date I stopped using it:         Why I stopped using it:            Medication:  CARVEDILOL 6.25 MG PO TABS      How I use it:  TAKE 1 TABLET BY MOUTH TWO TIMES DAILY WITH MEALS      Why I use it: High blood pressure    Prescriber:  Kyle López MD      Date I started using it:       Date I stopped using it:         Why I stopped using it:            Medication:  CLOPIDOGREL BISULFATE 75 MG PO TABS      How I use it:  Take 1 tablet (75 mg) by mouth daily      Why I use it: Blood thinner    Prescriber:  Kyle López MD      Date I started using it:       Date I stopped using it:         Why I stopped using it:            Medication:  FUROSEMIDE 20 MG PO TABS      How I use it:  Take 1 tablet (20 mg) by mouth daily      Why I use it: High blood pressure    Prescriber:  Kyle López MD      Date I started using it:       Date I stopped using it:         Why I stopped using it:            Medication:  LOSARTAN POTASSIUM 50 MG PO TABS      How I use it:  Take 1 tablet (50 mg) by mouth daily      Why I use it: High blood pressure    Prescriber:  Kyle López MD      Date I started using it:       Date I stopped using it:         Why I stopped using it:            Medication:  NAPROXEN 375 MG PO TABS      How I use it:  Take 1 tablet (375 mg) by mouth 2 times daily as needed for moderate pain Take with meals only.      Why I use it: Achilles  pain as needed    Prescriber:  Kyle López MD      Date I started using it:       Date I stopped using it:         Why I stopped using it:            Medication:  NITROGLYCERIN 0.4 MG SL SUBL      How I use it:  PLACE 1 TABLET UNDER THE TONGUE EVERY 5 MINUTES AS NEEDED FOR CHEST PAIN.      Why I use it: Chest pain    Prescriber:  Kyle López MD      Date I started using it:       Date I stopped using it:         Why I stopped using it:            Medication:  RANITIDINE  MG PO TABS      How I use it:  Take 1 tablet (150 mg) by mouth 2 times daily      Why I use it: heartburn    Prescriber:  Kyle López MD      Date I started using it:       Date I stopped using it:         Why I stopped using it:            Medication:  UMECLIDINIUM BROMIDE 62.5 MCG/INH IN AEPB      How I use it:  Inhale 1 puff into the lungs daily      Why I use it: COPD    Prescriber:  Kyle López MD      Date I started using it:       Date I stopped using it:         Why I stopped using it:            Medication:  ZOLPIDEM TARTRATE 5 MG PO TABS      How I use it:  Take 1 tablet (5 mg) by mouth nightly as needed      Why I use it: insomnia    Prescriber:  Kyle López MD      Date I started using it:       Date I stopped using it:         Why I stopped using it:            Medication:         How I use it:         Why I use it:      Prescriber:         Date I started using it:       Date I stopped using it:         Why I stopped using it:            Medication:         How I use it:         Why I use it:      Prescriber:         Date I started using it:       Date I stopped using it:         Why I stopped using it:            Medication:         How I use it:         Why I use it:      Prescriber:         Date I started using it:       Date I stopped using it:         Why I stopped using it:              Other Information:     If you have any questions about your action plan, call  [phone].    According to the Paperwork Reduction Act of 1995, no persons are required to respond to a collection of information unless it displays a valid OMB control number. The valid OMB number for this information collection is [id number]. The time required to complete this information collection is estimated to average 40 minutes per response, including the time to review instructions, searching existing data resources, gather the data needed, and complete and review the information collection. If you have any comments concerning the accuracy of the time estimate(s) or suggestions for improving this form, please write to: CMS, Attn: CHANDAN Reports Clearance Officer, [address] [place] [phone].

## 2018-02-19 NOTE — PROGRESS NOTES
"SUBJECTIVE/OBJECTIVE:                Joseph Cardona is a 68 year old male coming in for a follow-up visit for Medication Therapy Management.  [facility]   This is a follow-up visit but the first visit of this calendar year.    Chief Complaint: Follow up from [facility] visit on 5/8/17.  \"want to have my Echo at [facility]\"  Tobacco: History of tobacco dependence - quit August 2015   Alcohol: not currently using    Medication Adherence: No issues. Adherence is excellent; denies any missed doses. He sets up own med boxes and takes meds 2 times per day.     Hx STEMI/HTN: Currently taking losartan 50mg daily, carvedilol 6.25mg BID, furosemide 20mg daily, ASA 81mg daily and Plavix 75mg daily. Also has nitrostat 0.4mg SL on-hand for chest pain, though he has never used it. Denies abnormal bruising/bleeding or other side effects. [place]. Denies lightheadedness or orthostatic hypotension. Continues to complain of significant urination with furosemide. Denies any hx edema. Does not check BP outside of clinic.  Echo was ordered for him but pt wants to complete at [facility]; he tried showing up at [facility] but they were unable to complete without an order.   Cardiology: Dr Hernandez at [facility]  Last visit 12/13/2016 Matthew BARBER per [facility]:  1. Coronary artery disease.     Joseph Cardona presents to the Cardiology clinic today in annual routine follow-up. He offers no concerns of exertional angina or dyspnea on exertion today.     I will have him continue all of his medications in this regard, including aspirin, atorvastatin, as well as Plavix.    2. Ischemic cardiomyopathy with chronic systolic heart failure.     Mr Cardona's last echocardiogram performed in November 2015 demonstrated an LV function near 35%. He has had no further evaluation since this time.     I have asked that Joseph have a repeat echocardiogram within the next 1 month and we will " contact him with the results. If his left ventricular function continues to be 35% or less, we may consider a discussion of ICD implantation. Mr Cardona does seem receptive to this initially.     He is to continue his current medications, including Losartan as well as Carvedilol.      Hyperlipidemia/PVD: Current therapy includes atorvastatin 40mg once daily.  Pt reports no significant myalgias or other side effects.   Per [facility] vascular specialty consult Christo Corona MD 1/16/17:  Joseph Cardona is a 66-year-old male status post left leg intervention. 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 However, with his symptomatic improvement with iliac stenting, I would recommend just medical optimization at this time. This should include increased physical activity with exercise walking program, appropriate cholesterol management and appropriate blood pressure management. I told the patient to follow up with [facility] regarding interval surveillance and if they do not want to proceed with followup, he can call the office and we will continue to manage his postoperative ultrasound imaging.       COPD: Current medications: Incruse once daily and Albuterol used rarely, none recently.  Pt is not experiencing side effects.   Cost is not a problem for inhalers.  Pt reports the following symptoms: none.  Pt does not have an COPD Action Plan on file.   Has spirometry been completed: Yes      Insomnia: Current medications include: zolpidem 5mg every night. Pt reports trouble falling asleep and medication is effective. Denies side effects including daytime grogginess. No hx falls.      GERD: Current medications include: Protonix (pantoprazole) 20mg daily and ranitidine 150mg BID. No symptoms; pt feels his regimen is effective. Unaware that he is currently taking two medications for heartburn.     Current labs  include:  BP Readings from Last 3 Encounters:   02/02/18 138/74   07/20/17 155/81   06/26/17 135/70     Today's Vitals: /80 x2 with recheck  Lab Results   Component Value Date    A1C 5.6 06/26/2017   .  Lab Results   Component Value Date    CHOL 119 02/02/2018     Lab Results   Component Value Date    TRIG 72 02/02/2018     Lab Results   Component Value Date    HDL 39 02/02/2018     Lab Results   Component Value Date    LDL 66 02/02/2018       Liver Function Studies -   Recent Labs   Lab Test  02/02/18   1130   PROTTOTAL  7.4   ALBUMIN  3.9   BILITOTAL  1.2   ALKPHOS  68   AST  13   ALT  17       Lab Results   Component Value Date    UCRR 57 02/02/2018    MICROL 7 02/02/2018    UMALCR 11.63 02/02/2018       Last Basic Metabolic Panel:  Lab Results   Component Value Date     02/02/2018      Lab Results   Component Value Date    POTASSIUM 4.5 02/02/2018     Lab Results   Component Value Date    CHLORIDE 103 02/02/2018     Lab Results   Component Value Date    BUN 22 02/02/2018     Lab Results   Component Value Date    CR 1.13 02/02/2018     GFR Estimate   Date Value Ref Range Status   02/02/2018 64 >60 mL/min/1.7m2 Final     Comment:     Non  GFR Calc   06/26/2017 72 >60 mL/min/1.7m2 Final     Comment:     Non  GFR Calc   01/05/2017 76 >60 mL/min/1.7m2 Final     Comment:     Non  GFR Calc     GFR Estimate If Black   Date Value Ref Range Status   02/02/2018 78 >60 mL/min/1.7m2 Final     Comment:      GFR Calc   06/26/2017 87 >60 mL/min/1.7m2 Final     Comment:      GFR Calc   01/05/2017 >90   GFR Calc   >60 mL/min/1.7m2 Final     TSH   Date Value Ref Range Status   11/17/2015 4.71 (H) 0.40 - 4.00 mU/L Final   ]    Most Recent Immunizations   Administered Date(s) Administered     Influenza (High Dose) 3 valent vaccine 09/06/2017     Pneumo Conj 13-V (2010&after) 11/08/2016     Pneumococcal 23 valent 11/17/2015      "TDAP Vaccine (Adacel) 11/17/2015     Est CrCl ([place]-Gault, IBW 73kg) ~ 63 ml/min  Wt Readings from Last 1 Encounters:   02/02/18 188 lb (85.3 kg)     Ht Readings from Last 1 Encounters:   02/02/18 5' 10\" (1.778 m)     Creatinine   Date Value Ref Range Status   02/02/2018 1.13 0.66 - 1.25 mg/dL Final   ]      ASSESSMENT:              Current medications were reviewed today as discussed above.  Medicare Part D topics discussed:Medication safety and Medication changes    Medication Adherence: no issues identified. Pt has difficulty with the     Hx STEMI/HTN: needs improvement. BP today not at goal <140/90; will hold off on changing antihypertensives until pt completes Echo, as he may need to be titrated up anyway on beta-blocker and ARB if continues to have low EF.   Continue on two antiplatelet agents per cardiology recommendations.   Routed message to PCP regarding change in facility for Echo per pt request.    Hyperlipidemia/PVD: stable. Pt is taking high intensity statin as recommended per AHA/ACC guidelines.     COPD: Stable.     insomnia: stable, however not recommended for pt to continue taking zolpidem per [facility] list safety concerns (increased risk of cognitive impairment, falls). Encouraged pt try to take sparingly PRN. Alternative therapy that could be considered: melatonin, trazodone. Provided dose recommendations for melatonin.     GERD: stable, however pt is taking duplicate therapy for GERD and does not appear to have tapered off PPI as planned.  Encouraged pt to d/c PPI and monitor on H2 blocker alone.       PLAN:                  1. D/c pantoprazole  2. Reduce zolpidem use to PRN sparingly  3. Complete Echo - sent change in facility request to PCP for consideration    I spent 40 minutes with this patient today (an extra 15 minutes was spent creating the Medication Action Plan). All changes were made via collaborative practice agreement with Kyle López. A copy of the visit note was provided " to the patient's primary care provider.     Will follow up in 3 months.    The patient was given a summary of these recommendations as an after visit summary.    Natalya Stephenson, PharmD, BCACP

## 2018-02-19 NOTE — MR AVS SNAPSHOT
After Visit Summary   2/19/2018    Joseph Cardona    MRN: [id number]           Patient Information     Date Of Birth          1950        Visit Information        Provider Department      2/19/2018 9:30 AM Natalya Stephenson, [facility] MTM        Care Instructions    Recommendations from today's MTM visit:                                                    MTM (medication therapy management) is a service provided by a clinical pharmacist designed to help you get the most of out of your medicines.   Today we reviewed what your medicines are for, how to know if they are working, that your medicines are safe and how to make your medicine regimen as easy as possible.     1. Stop taking pantoprazole. You are taking two medicines for heartburn right now.    2. Please stop taking naproxen if you are taking it right now. That should be a short term medicine for pain. Only take as needed.    3. Try not to take zolpidem for sleep. It can affect your balance and clarity of thinking, even the next day.  You could try melatonin over the counter.     4. I will talk to Dr López about your Echo    Next MTM visit: 3 months    To schedule another MTM appointment, please call the clinic directly or you may call the MTM scheduling line at [phone] or toll-free at [phone].     My Clinical Pharmacist's contact information:                                                      It was a pleasure seeing you today!  Please feel free to contact me with any questions or concerns you have.      Natalya Stephenson, PharmD, [facility]   [phone]    You may receive a survey about the MTM services you received.  I would appreciate your feedback to help me serve you better in the future. Please fill it out and return it when you can. Your comments will be anonymous.      My healthcare goals:                                                      Cholesterol Goals:    Things you can do (in  "addition to taking your atorvastatin 40mg once daily) to lower your LDL cholesterol and triglycerides:    Lifestyle changes (based on research you may lower your LDL cholesterol by making the following changes):     Change LDL Reduction   Saturated Fat Decrease to <7% of calories 8-10%   Dietary Cholesterol Decrease to <200mg/day 3-5%   Weight  Lose 10 lbs if overweight 5-8%   Soluble Fiber  Add 5-10 grams/day 3-5%   Plant Stanols/Sterols Add 2 grams/day 5-15%   Total  20-30%   Adapted from http://www.nhlbi.nih.gov/health/public/heart/chol/chol_tlc.pdf    A few YOUTUBE Videos for some more information:    More information on plant stanols/sterols and how they work:  http://www.[employer].com/watch?v=XM713dMC_qH    Explanation of what cholesterol is:   http://www.[employer].com/watch?v=-PwRVw5EExY&feature=related    The \"walking 30 minutes per day\" youtube video:  Http://www.[employer].com/watch?v=yEmQnL9GCDt    Goals for patients with hypertension (high blood pressure):  Your blood pressure should be less than <140/90.   Today, yours was   BP Readings from Last 1 Encounters:   02/19/18 154/80   .  Things you can do to help lower your blood pressure:  1) Take your medications as directed  2) Regular exercise- Aim for at least 30 minutes of daily physical activity.  3) Weight loss if overweight- losing as little as 10 lbs. can reduce blood pressure  4) Avoid excess dietary sodium (salt).  You should not consume more than 1500mg of sodium per day.  Following this restriction can lower your blood pressure as much as adding another medication.  -Foods high in sodium include: ham, biswas, deli meats, canned soups and vegetables, frozen meals, gravy, chips, cheeses, fast food, bread.  -1 teaspoon of table salt is about 2300mg of sodium.  5) Avoid alcohol- if you drink, please do so in moderation (no more than 1 drink per day for women or 2 drinks per day for men).  6) Quit smoking- Tobacco injures vessel walls and speeds up the " "process of hardening of the arteries, making it harder to control your blood pressure.  7) Look for ways to reduce stress in your life.  8)  Try and get plenty of sleep.  9) Consider the DASH diet.  This diet can lower your blood pressure as much as adding another medication if followed correctly.   a. The DASH diet is high in fruits and vegetables, whole grains, fat-free or low-fat milk products, fish and poultry, beans, seeds and nuts.  b. The DASH diet is low in salt, sodium, sugar, sweets, high-sugar drinks, red meat, saturated fat and trans fats.  c. For more information on the DASH diet, visit the National Heart, Lung and Blood [place] at http://www.nhlbi.nih.gov                  Follow-ups after your visit        Who to contact     If you have questions or need follow up information about today's clinic visit or your schedule please contact [facility] directly at [phone].  Normal or non-critical lab and imaging results will be communicated to you by [employer], letter or phone within 4 business days after the clinic has received the results. If you do not hear from us within 7 days, please contact the clinic through [employer] or phone. If you have a critical or abnormal lab result, we will notify you by phone as soon as possible.  Submit refill requests through LightTable or call your pharmacy and they will forward the refill request to us. Please allow 3 business days for your refill to be completed.          Additional Information About Your Visit        LightTable Information     LightTable lets you send messages to your doctor, view your test results, renew your prescriptions, schedule appointments and more. To sign up, go to [email or web address] . Click on \"Log in\" on the left side of the screen, which will take you to the Welcome page. Then click on \"Sign up Now\" on the right side of the page.     You will be asked to enter the access code listed below, as well as some personal " information. Please follow the directions to create your username and password.     Your access code is: [email or web address]  Expires: 5/3/2018 11:23 AM     Your access code will  in 90 days. If you need help or a new code, please call your [place] clinic or [phone].        Care EveryWhere ID     This is your Care EveryWhere ID. This could be used by other organizations to access your [place] medical records  [id number]         Blood Pressure from Last 3 Encounters:   18 154/80   18 138/74   17 155/81    Weight from Last 3 Encounters:   18 188 lb (85.3 kg)   17 186 lb 8 oz (84.6 kg)   17 186 lb 8 oz (84.6 kg)              Today, you had the following     No orders found for display       Primary Care Provider Office Phone # Fax #    Kyle López [phone]       [address]  [facility] [address]        Equal Access to Services     [facility]: Hadii aad ku hadasho Soomaali, waaxda luqadaha, qaybta kaalmada adeegyada, waxay nany lutz . So [facility] [phone].    ATENCIÓN: Si habla español, tiene a murry disposición servicios gratuitos de asistencia lingüística. [facility] [phone].    We comply with applicable federal civil rights laws and [place] laws. We do not discriminate on the basis of race, color, national origin, age, disability, sex, sexual orientation, or gender identity.            Thank you!     Thank you for choosing [facility]  for your care. Our goal is always to provide you with excellent care. Hearing back from our patients is one way we can continue to improve our services. Please take a few minutes to complete the written survey that you may receive in the mail after your visit with us. Thank you!             Your Updated Medication List - Protect others around you: Learn how to safely use, store and throw away your medicines at www.disposemymeds.org.          This list is  accurate as of 2/19/18  9:53 AM.  Always use your most recent med list.                   Brand Name Dispense Instructions for use Diagnosis    albuterol 108 (90 BASE) MCG/ACT Inhaler    VENTOLIN HFA    1 Inhaler    Inhale 2 puffs into the lungs every 4 hours as needed for shortness of breath / dyspnea or wheezing    COPD, mild (H)       aspirin 81 MG chewable tablet      81 mg daily        atorvastatin 40 MG tablet    LIPITOR    90 tablet    Take 1 tablet (40 mg) by mouth daily    Coronary artery disease of native heart with stable angina pectoris, unspecified vessel or lesion type (H)       carvedilol 6.25 MG tablet    COREG    180 tablet    TAKE 1 TABLET BY MOUTH TWO TIMES DAILY WITH MEALS    Essential hypertension with goal blood pressure less than 140/90, Coronary artery disease of native heart with stable angina pectoris, unspecified vessel or lesion type (H)       clopidogrel 75 MG tablet    PLAVIX    90 tablet    Take 1 tablet (75 mg) by mouth daily    Coronary artery disease of native heart with stable angina pectoris, unspecified vessel or lesion type (H)       furosemide 20 MG tablet    LASIX    90 tablet    Take 1 tablet (20 mg) by mouth daily    Essential hypertension with goal blood pressure less than 140/90       losartan 50 MG tablet    COZAAR    90 tablet    Take 1 tablet (50 mg) by mouth daily    Essential hypertension with goal blood pressure less than 140/90       naproxen 375 MG tablet    NAPROSYN    60 tablet    Take 1 tablet (375 mg) by mouth 2 times daily as needed for moderate pain Take with meals only.    Achilles tendinitis of right lower extremity       nitroGLYcerin 0.4 MG sublingual tablet    NITROSTAT    25 tablet    PLACE 1 TABLET UNDER THE TONGUE EVERY 5 MINUTES AS NEEDED FOR CHEST PAIN.    Coronary artery disease of native heart with stable angina pectoris, unspecified vessel or lesion type (H)       pantoprazole 20 MG EC tablet    PROTONIX    90 tablet    TAKE 1 TABLET BY MOUTH  DAILY 30 MINUTES BEFORE BREAKFAST.    Gastroesophageal reflux disease, esophagitis presence not specified       ranitidine 150 MG tablet    ZANTAC    180 tablet    Take 1 tablet (150 mg) by mouth 2 times daily    Gastroesophageal reflux disease without esophagitis       umeclidinium 62.5 MCG/INH oral inhaler    INCRUSE ELLIPTA    1 Inhaler    Inhale 1 puff into the lungs daily    COPD, mild (H)       zolpidem 5 MG tablet    AMBIEN    30 tablet    Take 1 tablet (5 mg) by mouth nightly as needed    Persistent insomnia

## 2018-02-19 NOTE — PROGRESS NOTES
Pt prefers to have Echo completed at [facility] - pended order for PCP and routed for consideration.    Natalya Stephenson, PharmD, BCACP

## 2018-02-22 DIAGNOSIS — Z12.11 SCREEN FOR COLON CANCER: ICD-10-CM

## 2018-02-22 LAB — HEMOCCULT STL QL IA: NEGATIVE

## 2018-02-22 PROCEDURE — 82274 ASSAY TEST FOR BLOOD FECAL: CPT | Performed by: INTERNAL MEDICINE

## 2018-02-26 NOTE — PROGRESS NOTES
Called and LVM informing pt order for Echo was signed and faxed to Juan. Pt can schedule at his convenience.     Natalya Stephenson, RyannD, BCACP

## 2018-03-09 ENCOUNTER — TRANSFERRED RECORDS (OUTPATIENT)
Dept: HEALTH INFORMATION MANAGEMENT | Facility: CLINIC | Age: 68
End: 2018-03-09

## 2018-03-09 LAB — EJECTION FRACTION: 45

## 2018-04-25 ENCOUNTER — TRANSFERRED RECORDS (OUTPATIENT)
Dept: HEALTH INFORMATION MANAGEMENT | Facility: CLINIC | Age: 68
End: 2018-04-25

## 2018-04-25 ENCOUNTER — TELEPHONE (OUTPATIENT)
Dept: FAMILY MEDICINE | Facility: CLINIC | Age: 68
End: 2018-04-25

## 2018-04-25 ENCOUNTER — OFFICE VISIT (OUTPATIENT)
Dept: FAMILY MEDICINE | Facility: CLINIC | Age: 68
End: 2018-04-25
Payer: COMMERCIAL

## 2018-04-25 ENCOUNTER — RADIANT APPOINTMENT (OUTPATIENT)
Dept: GENERAL RADIOLOGY | Facility: CLINIC | Age: 68
End: 2018-04-25
Attending: INTERNAL MEDICINE
Payer: COMMERCIAL

## 2018-04-25 VITALS
HEART RATE: 91 BPM | WEIGHT: 189 LBS | DIASTOLIC BLOOD PRESSURE: 74 MMHG | SYSTOLIC BLOOD PRESSURE: 128 MMHG | HEIGHT: 70 IN | OXYGEN SATURATION: 95 % | TEMPERATURE: 97.4 F | BODY MASS INDEX: 27.06 KG/M2

## 2018-04-25 DIAGNOSIS — Z86.39 HISTORY OF DIABETES MELLITUS, TYPE II: ICD-10-CM

## 2018-04-25 DIAGNOSIS — I20.9 ANGINA PECTORIS (H): Primary | ICD-10-CM

## 2018-04-25 DIAGNOSIS — I25.9 CHRONIC ISCHEMIC HEART DISEASE: ICD-10-CM

## 2018-04-25 DIAGNOSIS — I48.0 PAROXYSMAL ATRIAL FIBRILLATION (H): ICD-10-CM

## 2018-04-25 LAB
BASOPHILS # BLD AUTO: 0.1 10E9/L (ref 0–0.2)
BASOPHILS NFR BLD AUTO: 0.6 %
DIFFERENTIAL METHOD BLD: ABNORMAL
EOSINOPHIL # BLD AUTO: 0.2 10E9/L (ref 0–0.7)
EOSINOPHIL NFR BLD AUTO: 1.8 %
ERYTHROCYTE [DISTWIDTH] IN BLOOD BY AUTOMATED COUNT: 12.9 % (ref 10–15)
HBA1C MFR BLD: 5.7 % (ref 0–5.6)
HCT VFR BLD AUTO: 40.9 % (ref 40–53)
HGB BLD-MCNC: 13.7 G/DL (ref 13.3–17.7)
LYMPHOCYTES # BLD AUTO: 1.2 10E9/L (ref 0.8–5.3)
LYMPHOCYTES NFR BLD AUTO: 14.3 %
MCH RBC QN AUTO: 31.6 PG (ref 26.5–33)
MCHC RBC AUTO-ENTMCNC: 33.5 G/DL (ref 31.5–36.5)
MCV RBC AUTO: 95 FL (ref 78–100)
MONOCYTES # BLD AUTO: 1.1 10E9/L (ref 0–1.3)
MONOCYTES NFR BLD AUTO: 12.2 %
NEUTROPHILS # BLD AUTO: 6.2 10E9/L (ref 1.6–8.3)
NEUTROPHILS NFR BLD AUTO: 71.1 %
PLATELET # BLD AUTO: 234 10E9/L (ref 150–450)
RBC # BLD AUTO: 4.33 10E12/L (ref 4.4–5.9)
TROPONIN I SERPL-MCNC: 0.48 UG/L (ref 0–0.04)
WBC # BLD AUTO: 8.7 10E9/L (ref 4–11)

## 2018-04-25 PROCEDURE — 84443 ASSAY THYROID STIM HORMONE: CPT | Performed by: INTERNAL MEDICINE

## 2018-04-25 PROCEDURE — 36415 COLL VENOUS BLD VENIPUNCTURE: CPT | Performed by: INTERNAL MEDICINE

## 2018-04-25 PROCEDURE — 83036 HEMOGLOBIN GLYCOSYLATED A1C: CPT | Performed by: INTERNAL MEDICINE

## 2018-04-25 PROCEDURE — 71046 X-RAY EXAM CHEST 2 VIEWS: CPT | Mod: FY

## 2018-04-25 PROCEDURE — 85025 COMPLETE CBC W/AUTO DIFF WBC: CPT | Performed by: INTERNAL MEDICINE

## 2018-04-25 PROCEDURE — 99215 OFFICE O/P EST HI 40 MIN: CPT | Performed by: INTERNAL MEDICINE

## 2018-04-25 PROCEDURE — 84484 ASSAY OF TROPONIN QUANT: CPT | Performed by: INTERNAL MEDICINE

## 2018-04-25 PROCEDURE — 93000 ELECTROCARDIOGRAM COMPLETE: CPT | Performed by: INTERNAL MEDICINE

## 2018-04-25 RX ORDER — ISOSORBIDE MONONITRATE 30 MG/1
15 TABLET, EXTENDED RELEASE ORAL EVERY MORNING
Qty: 45 TABLET | Refills: 3 | Status: SHIPPED | OUTPATIENT
Start: 2018-04-25 | End: 2018-05-07

## 2018-04-25 ASSESSMENT — PAIN SCALES - GENERAL: PAINLEVEL: NO PAIN (0)

## 2018-04-25 NOTE — PATIENT INSTRUCTIONS
At [facility], we strive to deliver an exceptional experience to you, every time we see you.  If you receive a survey in the mail, please send us back your thoughts. We really do value your feedback.    Based on your medical history, these are the current health maintenance/preventive care services that you are due for (some may have been done at this visit.)  Health Maintenance Due   Topic Date Due     COPD ACTION PLAN Q1 YR  1950     EYE EXAM Q1 YEAR  02/16/1951     FOOT EXAM Q1 YEAR  07/29/2017     TSH W/ FREE T4 REFLEX Q2 YEAR  11/17/2017     A1C Q6 MO  12/26/2017         Suggested websites for health information:  Www.[facility].org : Up to date and easily searchable information on multiple topics.  Www.medlineplus.gov : medication info, interactive tutorials, watch real surgeries online  Www.familydoctor.org : good info from the Academy of Family Physicians  Www.cdc.gov : public health info, travel advisories, epidemics (H1N1)  Www.aap.org : children's health info, normal development, vaccinations  Www.health.[facility].[place].us : [place] dept of health, public health issues in [place], N1N1    Your care team:                            Family Medicine Internal Medicine   MD Kyle Mario MD Shantel Branch-Fleming, MD Katya Georgiev PA-C Nam Ho, MD Pediatrics   RONI Ricci, MD Rohini Lanza CNP, MD Deborah Mielke, MD Kim Thein, APRN CNP      Clinic hours: Monday - Thursday 7 am-7 pm; Fridays 7 am-5 pm.   Urgent care: Monday - Friday 11 am-9 pm; Saturday and Sunday 9 am-5 pm.  Pharmacy : Monday -Thursday 8 am-8 pm; Friday 8 am-6 pm; Saturday and Sunday 9 am-5 pm.     Clinic: [phone]   Pharmacy: [phone]

## 2018-04-25 NOTE — TELEPHONE ENCOUNTER
Gave patient results and providers message. Patient stated that he had someone who could drive him to the ER. He was going to go to the closest ED.     Jeffry Zapata RN, BSN

## 2018-04-25 NOTE — TELEPHONE ENCOUNTER
Received Troponin result from lab as critical. ( Result- 0.476 ). Result given to Dr. Aranda.   Huddled with Dr. Aranda. Per Dr. Aranda, patient is to go to ER for evaluation. Patient seen this morning for chest pain.    Fwd to RN to contact patient.    Mary Corona MA

## 2018-04-25 NOTE — PROGRESS NOTES
SUBJECTIVE:   Joseph Cardona is a 68 year old male who presents to clinic today for the following health issues:    CHEST PAIN     Onset: 4 days.    Description: Patient experienced chest pain after shoveling snow the past weekend. He currently denies any chest pain today.  Location:  Center of chest  Character: achey  Radiation: none  Duration: 10-20 minutes     Intensity: moderate    Progression of Symptoms:  improving    Accompanying Signs & Symptoms:  Shortness of breath: YES  Sweating: YES  Nausea/vomiting: no   Lightheadedness: YES  Palpitations: no  Fever/Chills: no  Cough: no  Heartburn: no    History:   Family history of heart disease YES- parents and brother  Tobacco use: no    Precipitating factors:   Worse with exertion: YES  Worse with deep breaths :  no  Related to food: no    Alleviating factors:         Therapies Tried and outcome: nitroglycerin, helps a lot with symptoms. None taken today since patient did not have chest pain today. Patient is accompanied by his wife today. His wife informed this provider that Joseph will intermittently complain of chest pain but he does not always inform his wife about it.      Problem list and histories reviewed & adjusted, as indicated.  Additional history: as documented    Patient Active Problem List   Diagnosis     Hyperlipidemia LDL goal <70     Essential hypertension     Advanced directives, counseling/discussion     COPD, mild (H)     Tinea pedis of left foot     Coronary artery disease involving native coronary artery of native heart without angina pectoris     Positive hepatitis C antibody test     Tobacco dependence syndrome     Paroxysmal atrial fibrillation (H)     Intermittent claudication (H)     Peripheral vascular disease (H)     S/P insertion of iliac artery stent     Past Surgical History:   Procedure Laterality Date     CARDIAC SURGERY      Bypass double and quadruple     EYE SURGERY      cataract surgery     HEAD & NECK SURGERY       Caratid surgery     VASCULAR SURGERY         Social History   Substance Use Topics     Smoking status: Former Smoker     Smokeless tobacco: Never Used      Comment: quit 2 years ago     Alcohol use 0.0 oz/week     0 Standard drinks or equivalent per week     History reviewed. No pertinent family history.      Allergies   Allergen Reactions     Lisinopril Cough     Recent Labs   Lab Test  02/02/18   1130  06/26/17   1154   11/08/16   1139  07/14/16   1043  02/09/16   1157   11/17/15   1212   A1C   --   5.6   --    --   5.4  5.6   --    --    LDL  66  58   --   78   --    --    --    --    HDL  39*  36*   --   36*   --    --    --    --    TRIG  72  77   --   50   --    --    --    --    ALT  17  15   --   17   --   19   < >   --    CR  1.13  1.03   < >  0.97   --   1.15   < >   --    GFRESTIMATED  64  72   < >  77   --   63   < >   --    GFRESTBLACK  78  87   < >  >90   GFR Calc     --   77   < >   --    POTASSIUM  4.5  4.6   --   4.4   --   4.1   < >   --    TSH   --    --    --    --    --    --    --   4.71*    < > = values in this interval not displayed.      BP Readings from Last 3 Encounters:   04/25/18 128/74   02/19/18 154/80   02/02/18 138/74    Wt Readings from Last 3 Encounters:   04/25/18 189 lb (85.7 kg)   02/02/18 188 lb (85.3 kg)   07/20/17 186 lb 8 oz (84.6 kg)              ROS:  CONSTITUTIONAL: NEGATIVE for fever, chills, change in weight  INTEGUMENTARY/SKIN: NEGATIVE for worrisome rashes, moles or lesions  EYES: NEGATIVE for vision changes or irritation  ENT/MOUTH: NEGATIVE for ear, mouth and throat problems  RESP:POSITIVE for dyspnea on exertion and NEGATIVE for hemoptysis and pleurisy  CV: NEGATIVE for diaphoresis, lower extremity edema, orthopnea, palpitations and paroxysmal nocturnal dyspnea  GI: NEGATIVE for nausea, abdominal pain, heartburn, or change in bowel habits  : NEGATIVE for frequency, dysuria, or hematuria  MUSCULOSKELETAL: NEGATIVE for significant arthralgias or  "myalgia  NEURO: NEGATIVE for weakness, dizziness or paresthesias  ENDOCRINE: NEGATIVE for temperature intolerance, skin/hair changes  HEME: NEGATIVE for bleeding problems  PSYCHIATRIC: NEGATIVE for changes in mood or affect    OBJECTIVE:     /74 (BP Location: Left arm, Patient Position: Chair, Cuff Size: Adult Regular)  Pulse 91  Temp 97.4  F (36.3  C) (Oral)  Ht 5' 10\" (1.778 m)  Wt 189 lb (85.7 kg)  SpO2 95%  BMI 27.12 kg/m2  Body mass index is 27.12 kg/(m^2).  GENERAL: healthy, alert and no distress  EYES: Eyes grossly normal to inspection and conjunctivae and sclerae normal  HENT: normal cephalic/atraumatic and oral mucous membranes moist  NECK: no adenopathy  RESP: lungs clear to auscultation - no rales, rhonchi or wheezes  CV: regular rate and rhythm, normal S1 S2, no S3 or S4, no murmur, click or rub, no peripheral edema and peripheral pulses strong  MS: no gross musculoskeletal defects noted, no edema  SKIN: no suspicious lesions or rashes  NEURO: Normal strength and tone, mentation intact and speech normal  PSYCH: mentation appears normal, affect normal/bright    Diagnostic Test Results:  WBC 8.7  4.0 - 11.0 10e9/L Final 04/25/2018 11:18 AM BK   RBC Count 4.33 (L) 4.4 - 5.9 10e12/L Final 04/25/2018 11:18 AM BK   Hemoglobin 13.7  13.3 - 17.7 g/dL Final 04/25/2018 11:18 AM BK   Hematocrit 40.9  40.0 - 53.0 % Final 04/25/2018 11:18 AM BK   MCV 95  78 - 100 fl Final 04/25/2018 11:18 AM BK   MCH 31.6  26.5 - 33.0 pg Final 04/25/2018 11:18 AM BK   MCHC 33.5  31.5 - 36.5 g/dL Final 04/25/2018 11:18 AM BK   RDW 12.9  10.0 - 15.0 % Final 04/25/2018 11:18 AM BK   Platelet Count 234  150 - 450 10e9/L Final 04/25/2018 11:18 AM BK   Diff Method     Final 04/25/2018 11:18 AM BK   Automated Method   % Neutrophils 71.1   % Final 04/25/2018 11:18 AM BK   % Lymphocytes 14.3   % Final 04/25/2018 11:18 AM BK   % Monocytes 12.2   % Final 04/25/2018 11:18 AM BK   % Eosinophils 1.8   % Final 04/25/2018 11:18 AM BK   % " Basophils 0.6   % Final 04/25/2018 11:18 AM BK   Absolute Neutrophil 6.2  1.6 - 8.3 10e9/L Final 04/25/2018 11:18 AM BK   Absolute Lymphocytes 1.2  0.8 - 5.3 10e9/L Final 04/25/2018 11:18 AM BK   Absolute Monocytes 1.1  0.0 - 1.3 10e9/L Final 04/25/2018 11:18 AM BK   Absolute Eosinophils 0.2  0.0 - 0.7 10e9/L Final 04/25/2018 11:18 AM BK   Absolute Basophils 0.1  0.0 - 0.2 10e9/L Final 04/25/2018 11:18 AM        Troponin I pending at the time of patient's evaluation today up until discharge from the clinic.    ASSESSMENT/PLAN:     (I20.9) Stable angina pectoris (H)  (primary encounter diagnosis)  Comment: Most probable diagnosis. His last episode of chest pain occurred 4 days ago and it was relieved when he took sublingual nitroglycerin. This most likely influenced patient's decision not to call 911 or even go to the ER when he developed chest pain four days ago. The patient currently denies any chest pain today, vital signs are normal, physical examination is unremarkable and his chest x-ray does not show acute pulmonary edema. EKG today does not show acute infarction (no ST-segment elevation). Other findings include QRS widening at leads V1 & V2 and nonspecific ST-T changes in the inferior leads  that are not indicative of acute infarction. Previous EKG from 2/2/2018 was also compared. Troponin I result is not available during today's evaluation. Due to these factors, the patient was not sent directly to the ER. Once his Troponin I result is available, he will be promptly notified. If Troponin I is normal, then I recommend outpatient evaluation at his cardiologist's office ([facility]). But if Troponin I is abnormal, then he will advised to go ER ASAP. Other lab test shows normal CBC without any evidence of elevated white blood cell count and  no acute anemia. I explained to the patient and his wife that an elevated white blood cell count may indirectly indicate myocardial ischemia  while his Troponin I is not available yet. I was able to speak with one of the nurses from the office of his cardiologist (Dr. Hernandez from [facility]) about his evaluation today. I provided them a summary of Mr. Cardona's physical examination and available tests. I also explained the necessity of further outpatient testing in lieu of today's diagnosis of stable angina pectoris. Dr. Mariee's office is aware of unavailable Troponin I results. In return, I was told by their office that they do not have any available appointments today and that Mr. Cardona will be notified about his appointment later today.  However, I advised  to go to ER if his chest pain recurs after today's visit and/or his Troponin I is abnormal.  Plan: EKG 12-lead complete w/read - Clinics, CBC with        platelets and differential, Troponin I            (I25.9) Chronic ischemic heart disease  Comment: Previous coronary angiography reviewed. Based on today's evaluation and history of coronary artery disease,  I suggested starting IMDUR 15 mg once daily and increasing Carvedilol to 9.375 mg BID. Dose of Losartan was reduced to 25 mg once daily to prevent hypotension as a result of increased dose of Carvedilol and addition of IMDUR.  Continue aspirin and Plavix at the same doses.  Plan: isosorbide mononitrate (IMDUR) 30 MG 24 hr         tablet, XR Chest 2 Views            (I48.0) Paroxysmal atrial fibrillation (H)  Comment: No evidence of atrial fibrillation from today's EKG.  Plan: TSH WITH FREE T4 REFLEX            (Z86.39) History of diabetes mellitus, type II  Comment: Current A1c indicates prediabetes. No reason to take any medications.  Plan: HEMOGLOBIN A1C          Disposition: Patient was discharged in stable condition and without complaints of chest pain. He was again reminded to go to ER if chest pain recurs.     Kyle López MD  [facility]

## 2018-04-25 NOTE — MR AVS SNAPSHOT
After Visit Summary   4/25/2018    Joseph Cardona    MRN: [id number]           Patient Information     Date Of Birth          1950        Visit Information        Provider Department      4/25/2018 10:20 AM Kyle López MD [facility]        Today's Diagnoses     Stable angina pectoris (H)    -  1    Chronic ischemic heart disease        Paroxysmal atrial fibrillation (H)        History of diabetes mellitus, type II          Care Instructions    At [facility], we strive to deliver an exceptional experience to you, every time we see you.  If you receive a survey in the mail, please send us back your thoughts. We really do value your feedback.    Based on your medical history, these are the current health maintenance/preventive care services that you are due for (some may have been done at this visit.)  Health Maintenance Due   Topic Date Due     COPD ACTION PLAN Q1 YR  1950     EYE EXAM Q1 YEAR  [birth year]16/1951     FOOT EXAM Q1 YEAR  07/29/2017     TSH W/ FREE T4 REFLEX Q2 YEAR  11/17/2017     A1C Q6 MO  12/26/2017         Suggested websites for health information:  Www.Codewars.View2Gether : Up to date and easily searchable information on multiple topics.  Www.medlineplus.gov : medication info, interactive tutorials, watch real surgeries online  Www.familydoctor.org : good info from the Academy of Family Physicians  Www.cdc.gov : public health info, travel advisories, epidemics (H1N1)  Www.aap.org : children's health info, normal development, vaccinations  Www.health.[facility].[place].us : [place] dept of health, public health issues in [place], N1N1    Your care team:                            Family Medicine Internal Medicine   MD Kyle Mario MD Shantel Branch-Fleming, MD Katya Georgiev PA-C Nam Ho, MD Pediatrics   Rashad Abbott, RONI Vera, MD Rohini Lanza CNP, MD Deborah Mielke, MD   "  [facility]      Clinic hours: Monday - Thursday 7 am-7 pm;  7 am-5 pm.   Urgent care: Monday - Friday 11 am-9 pm; Saturday and  9 am-5 pm.  Pharmacy : Monday -Thursday 8 am-8 pm; Friday 8 am-6 pm; Saturday and  9 am-5 pm.     Clinic: [phone]   Pharmacy: [phone]            Follow-ups after your visit        Who to contact     If you have questions or need follow up information about today's clinic visit or your schedule please contact [facility] [place] directly at [phone].  Normal or non-critical lab and imaging results will be communicated to you by MyChart, letter or phone within 4 business days after the clinic has received the results. If you do not hear from us within 7 days, please contact the clinic through MyChart or phone. If you have a critical or abnormal lab result, we will notify you by phone as soon as possible.  Submit refill requests through ezCater or call your pharmacy and they will forward the refill request to us. Please allow 3 business days for your refill to be completed.          Additional Information About Your Visit        ezCater Information     ezCater lets you send messages to your doctor, view your test results, renew your prescriptions, schedule appointments and more. To sign up, go to [email or web address] . Click on \"Log in\" on the left side of the screen, which will take you to the Welcome page. Then click on \"Sign up Now\" on the right side of the page.     You will be asked to enter the access code listed below, as well as some personal information. Please follow the directions to create your username and password.     Your access code is: [email or web address]  Expires: 5/3/2018 12:23 PM     Your access code will  in 90 days. If you need help or a new code, please call your [place] clinic or [phone].        Care EveryWhere ID     This is your Care EveryWhere ID. This could be used by other organizations to access " "your [place] medical records  [id number]        Your Vitals Were     Pulse Temperature Height Pulse Oximetry BMI (Body Mass Index)       91 97.4  F (36.3  C) (Oral) 5' 10\" (1.778 m) 95% 27.12 kg/m2        Blood Pressure from Last 3 Encounters:   04/25/18 128/74   02/19/18 154/80   02/02/18 138/74    Weight from Last 3 Encounters:   04/25/18 189 lb (85.7 kg)   02/02/18 188 lb (85.3 kg)   07/20/17 186 lb 8 oz (84.6 kg)              We Performed the Following     CBC with platelets and differential     EKG 12-lead complete w/read - Clinics     HEMOGLOBIN A1C     Troponin I     TSH WITH FREE T4 REFLEX     XR Chest 2 Views          Today's Medication Changes          These changes are accurate as of 4/25/18 11:56 AM.  If you have any questions, ask your nurse or doctor.               Start taking these medicines.        Dose/Directions    isosorbide mononitrate 30 MG 24 hr tablet   Commonly known as:  IMDUR   Used for:  Chronic ischemic heart disease   Started by:  Kyle López MD        Dose:  15 mg   Take 0.5 tablets (15 mg) by mouth every morning   Quantity:  45 tablet   Refills:  3         These medicines have changed or have updated prescriptions.        Dose/Directions    carvedilol 6.25 MG tablet   Commonly known as:  COREG   This may have changed:    - how much to take  - additional instructions   Used for:  Essential hypertension with goal blood pressure less than 140/90, Coronary artery disease of native heart with stable angina pectoris, unspecified vessel or lesion type (H)        TAKE 1 TABLET BY MOUTH TWO TIMES DAILY WITH MEALS   Quantity:  180 tablet   Refills:  3       losartan 50 MG tablet   Commonly known as:  COZAAR   This may have changed:  additional instructions   Used for:  Essential hypertension with goal blood pressure less than 140/90        Dose:  50 mg   Take 1 tablet (50 mg) by mouth daily   Quantity:  90 tablet   Refills:  3            Where to get your medicines      These " medications were sent to [facility] - [place], [place] - [address]  [address], [facility] [address]     Phone:  [phone]     isosorbide mononitrate 30 MG 24 hr tablet                Primary Care Provider Office Phone # Fax #    Kyle López [phone]       [address]  [facility] [address]        Equal Access to Services     [facility] : Hadii aad ku hadasho Soomaali, waaxda luqadaha, qaybta kaalmada adeegyada, waxay idiin hayaan adeeg kharash la'aan . So [facility] [phone].    ATENCIÓN: Si habla español, tiene a murry disposición servicios gratuitos de asistencia lingüística. [facility] [phone].    We comply with applicable federal civil rights laws and [place] laws. We do not discriminate on the basis of race, color, national origin, age, disability, sex, sexual orientation, or gender identity.            Thank you!     Thank you for choosing [facility]  for your care. Our goal is always to provide you with excellent care. Hearing back from our patients is one way we can continue to improve our services. Please take a few minutes to complete the written survey that you may receive in the mail after your visit with us. Thank you!             Your Updated Medication List - Protect others around you: Learn how to safely use, store and throw away your medicines at www.disposemymeds.org.          This list is accurate as of 4/25/18 11:56 AM.  Always use your most recent med list.                   Brand Name Dispense Instructions for use Diagnosis    albuterol 108 (90 Base) MCG/ACT Inhaler    VENTOLIN HFA    1 Inhaler    Inhale 2 puffs into the lungs every 4 hours as needed for shortness of breath / dyspnea or wheezing    COPD, mild (H)       aspirin 81 MG chewable tablet      81 mg daily        atorvastatin 40 MG tablet    LIPITOR    90 tablet    Take 1 tablet (40 mg) by mouth daily    Coronary artery disease of native heart  with stable angina pectoris, unspecified vessel or lesion type (H)       carvedilol 6.25 MG tablet    COREG    180 tablet    TAKE 1 TABLET BY MOUTH TWO TIMES DAILY WITH MEALS    Essential hypertension with goal blood pressure less than 140/90, Coronary artery disease of native heart with stable angina pectoris, unspecified vessel or lesion type (H)       clopidogrel 75 MG tablet    PLAVIX    90 tablet    Take 1 tablet (75 mg) by mouth daily    Coronary artery disease of native heart with stable angina pectoris, unspecified vessel or lesion type (H)       furosemide 20 MG tablet    LASIX    90 tablet    Take 1 tablet (20 mg) by mouth daily    Essential hypertension with goal blood pressure less than 140/90       isosorbide mononitrate 30 MG 24 hr tablet    IMDUR    45 tablet    Take 0.5 tablets (15 mg) by mouth every morning    Chronic ischemic heart disease       losartan 50 MG tablet    COZAAR    90 tablet    Take 1 tablet (50 mg) by mouth daily    Essential hypertension with goal blood pressure less than 140/90       nitroGLYcerin 0.4 MG sublingual tablet    NITROSTAT    25 tablet    PLACE 1 TABLET UNDER THE TONGUE EVERY 5 MINUTES AS NEEDED FOR CHEST PAIN.    Coronary artery disease of native heart with stable angina pectoris, unspecified vessel or lesion type (H)       ranitidine 150 MG tablet    ZANTAC    180 tablet    Take 1 tablet (150 mg) by mouth 2 times daily    Gastroesophageal reflux disease without esophagitis       umeclidinium 62.5 MCG/INH oral inhaler    INCRUSE ELLIPTA    1 Inhaler    Inhale 1 puff into the lungs daily    COPD, mild (H)       zolpidem 5 MG tablet    AMBIEN    30 tablet    Take 1 tablet (5 mg) by mouth nightly as needed    Persistent insomnia

## 2018-04-26 LAB — TSH SERPL DL<=0.005 MIU/L-ACNC: 2.6 MU/L (ref 0.4–4)

## 2018-04-29 DIAGNOSIS — I25.10 CORONARY ARTERY DISEASE INVOLVING NATIVE CORONARY ARTERY OF NATIVE HEART WITHOUT ANGINA PECTORIS: ICD-10-CM

## 2018-04-29 DIAGNOSIS — I10 ESSENTIAL HYPERTENSION WITH GOAL BLOOD PRESSURE LESS THAN 140/90: ICD-10-CM

## 2018-04-29 NOTE — TELEPHONE ENCOUNTER
carvedilol (COREG) 6.25 MG tablet filled 2/2/18 with 180 tablets and 3 refills. losartan (COZAAR) 50 MG tablet filled 2/2/18 with 90 tablets and 3 refills.          Francisco Faarax  [facility]

## 2018-05-01 ENCOUNTER — TELEPHONE (OUTPATIENT)
Dept: CARE COORDINATION | Facility: CLINIC | Age: 68
End: 2018-05-01

## 2018-05-01 ENCOUNTER — PATIENT OUTREACH (OUTPATIENT)
Dept: FAMILY MEDICINE | Facility: CLINIC | Age: 68
End: 2018-05-01

## 2018-05-01 NOTE — LETTER
[place] CARE COORDINATION     May 1, 2018    Joseph JENNIE Cardona  [address] [address]      Dear Joseph,    I am a clinic care coordinator who works with Kyle López MD at [facility]. I have been trying to reach you recently to introduce Clinic Care Coordination and to see if there was anything I could assist you with.  I wanted to introduce myself and provide you with my contact information so that you can call me with questions or concerns about your health care. Below is a description of clinic care coordination and how I can further assist you.     The clinic care coordinator is a registered nurse and/or  who understand the health care system. The goal of clinic care coordination is to help you manage your health and improve access to the [place] system in the most efficient manner. The registered nurse can assist you in meeting your health care goals by providing education, coordinating services, and strengthening the communication among your providers. The  can assist you with financial, behavioral, psychosocial, chemical dependency, counseling, and/or psychiatric resources.    Please feel free to contact me at [phone], with any questions or concerns. We at [facility] are focused on providing you with the highest-quality healthcare experience possible and that all starts with you.     Sincerely,     Vikram Shelton, MSN, RN, PHN I Clinic Care Coordinator  [facility]  Phone: [phone]  [email or web address]    Enclosed: I have enclosed a copy of a 24 Hour Access Plan. This has helpful phone numbers for you to call when needed. Please keep this in an easy to access place to use as needed.

## 2018-05-01 NOTE — TELEPHONE ENCOUNTER
DC'd from [facility] on 4/29/18 to Home self care   Primary Problem: Acute coronary syndrome  LACE: 71 High

## 2018-05-01 NOTE — PROGRESS NOTES
Clinic Care Coordination Contact  [facility]/Voicemail    Referral Source: Non-[facility]  Clinical Data: Care Coordinator Outreach  Outreach attempted x 1.  Left message on voicemail with call back information and requested return call.  Plan: Care Coordinator will mail out care coordination introduction letter with care coordinator contact information and explanation of care coordination services. Care Coordinator will try to reach patient again in 1-2 business days.      Vikram Shelton, MSN, RN, PHN I Clinic Care Coordinator  [facility]  Phone: [phone]  [email or web address]

## 2018-05-01 NOTE — LETTER
Health Care Home - Access Care Plan    About Me  Patient Name:  Joseph Cardona    YOB: 1950  Age:                             68 year old   [place] MRN:            [id number] Telephone Information:     Home Phone [phone]   Mobile [phone]       Address:    [address] [place] [address]  [place] [address] Email address:  [email or web address]      Emergency Contact(s)  Name Relationship Lgl Grd Work Phone Home Phone Mobile Phone   ESAU FAIRCHILD Spouse   [phone]              Health Maintenance:      My Access Plan  Medical Emergency 911   Questions or concerns during clinic hours Primary Clinic Line, I will call the clinic directly: [facility] - [phone]   24 Hour Appointment Line [phone] or  [phone]) (toll free)   24 Hour Nurse Line [phone] (toll free)   Questions or concerns outside clinic hours 24 Hour Appointment Line, I will call the after-hours on-call line:   [facility] [phone] or [phone]) (toll-free)      Preferred Urgent Care     Preferred Hospital [facility]  [phone]   Preferred Pharmacy [facility] [place] [address] [place] AT [facility] [address]     Behavioral Health Crisis Line The National Suicide Prevention Lifeline at 1-187.826.7758 or 911     My Care Team Members  Patient Care Team       Relationship Specialty Notifications Start End    Kyle López MD PCP - General Internal Medicine  11/17/15     Phone: [phone] Fax: [phone]         [address] [place] [address]    Kyle López MD MD Internal Medicine  11/25/16     Comment:  referring to Vascular    Phone: [phone] Fax: [phone]         [address] [place] [address]    Bo Hampton MD Resident Radiology  11/29/16     Phone: [phone] Fax: [phone] [address] [facility] [address]           My  Medical and Care Information  Problem List   Patient Active Problem List   Diagnosis     Hyperlipidemia LDL goal <70     Essential hypertension     Advanced directives, counseling/discussion     COPD, mild (H)     Tinea pedis of left foot     Coronary artery disease involving native coronary artery of native heart without angina pectoris     Positive hepatitis C antibody test     Tobacco dependence syndrome     Paroxysmal atrial fibrillation (H)     Intermittent claudication (H)     Peripheral vascular disease (H)     S/P insertion of iliac artery stent

## 2018-05-02 RX ORDER — CARVEDILOL 6.25 MG/1
TABLET ORAL
Qty: 180 TABLET | Refills: 1 | OUTPATIENT
Start: 2018-05-02

## 2018-05-02 RX ORDER — LOSARTAN POTASSIUM 50 MG/1
TABLET ORAL
Qty: 90 TABLET | Refills: 1 | OUTPATIENT
Start: 2018-05-02

## 2018-05-02 NOTE — TELEPHONE ENCOUNTER
"Refusing both medication requests. Patient had both medications refilled on 2/2/18 and should have enough refills until 02/2019. Will notify pharmacy in Note to Pharmacy. Closing encounter.     Noy Schmitz RN    Medication Detail      Disp Refills Start End TIFFANIE   carvedilol (COREG) 6.25 MG tablet 180 tablet 3 2/2/2018  No   Sig: TAKE 1 TABLET BY MOUTH TWO TIMES DAILY WITH MEALS   Patient taking differently: 6.25 mg TAKE 1 1/2 TABLETS  BY MOUTH TWO TIMES DAILY WITH MEALS        Class: E-Prescribe   Order: [id number]   E-Prescribing Status: Receipt confirmed by pharmacy (2/2/2018 11:19 AM CST)       Medication Detail      Disp Refills Start End TIFFANIE   losartan (COZAAR) 50 MG tablet 90 tablet 3 2/2/2018  No   Sig: Take 1 tablet (50 mg) by mouth daily   Patient taking differently: Take 50 mg by mouth daily Take 1/2 tablet once a day.        Class: E-Prescribe   Route: Oral   Order: [id number]   E-Prescribing Status: Receipt confirmed by pharmacy (2/2/2018 11:19 AM CST)         Requested Prescriptions   Pending Prescriptions Disp Refills     carvedilol (COREG) 6.25 MG tablet [Pharmacy Med Name: CARVEDILOL 6.25 MG TABLET] 180 tablet 1     Sig: TAKE 1 TABLET BY MOUTH TWO TIMES DAILY WITH MEALS    Beta-Blockers Protocol Passed    4/29/2018 10:00 AM       Passed - Blood pressure under 140/90 in past 12 months    BP Readings from Last 3 Encounters:   04/25/18 128/74   02/19/18 154/80   02/02/18 138/74                Passed - Patient is age 6 or older       Passed - Recent (12 mo) or future (30 days) visit within the authorizing provider's specialty    Patient had office visit in the last 12 months or has a visit in the next 30 days with authorizing provider or within the authorizing provider's specialty.  See \"Patient Info\" tab in inbasket, or \"Choose Columns\" in Meds & Orders section of the refill encounter.            losartan (COZAAR) 50 MG tablet [Pharmacy Med Name: LOSARTAN POTASSIUM 50 MG TAB] 90 tablet 1     Sig: TAKE 1 " "TABLET BY MOUTH DAILY    Angiotensin-II Receptors Passed    4/29/2018 10:00 AM       Passed - Blood pressure under 140/90 in past 12 months    BP Readings from Last 3 Encounters:   04/25/18 128/74   02/19/18 154/80   02/02/18 138/74                Passed - Recent (12 mo) or future (30 days) visit within the authorizing provider's specialty    Patient had office visit in the last 12 months or has a visit in the next 30 days with authorizing provider or within the authorizing provider's specialty.  See \"Patient Info\" tab in inbasket, or \"Choose Columns\" in Meds & Orders section of the refill encounter.           Passed - Patient is age 18 or older       Passed - Normal serum creatinine on file in past 12 months    Recent Labs   Lab Test  02/02/18   1130   CR  1.13            Passed - Normal serum potassium on file in past 12 months    Recent Labs   Lab Test  02/02/18   1130   POTASSIUM  4.5                      "

## 2018-05-07 ENCOUNTER — OFFICE VISIT (OUTPATIENT)
Dept: FAMILY MEDICINE | Facility: CLINIC | Age: 68
End: 2018-05-07
Payer: COMMERCIAL

## 2018-05-07 VITALS
TEMPERATURE: 97.7 F | HEART RATE: 87 BPM | SYSTOLIC BLOOD PRESSURE: 99 MMHG | OXYGEN SATURATION: 96 % | HEIGHT: 70 IN | WEIGHT: 181 LBS | BODY MASS INDEX: 25.91 KG/M2 | DIASTOLIC BLOOD PRESSURE: 63 MMHG | RESPIRATION RATE: 12 BRPM

## 2018-05-07 DIAGNOSIS — I25.709 CORONARY ARTERY DISEASE INVOLVING CORONARY BYPASS GRAFT OF NATIVE HEART WITH ANGINA PECTORIS (H): Primary | ICD-10-CM

## 2018-05-07 DIAGNOSIS — I73.9 PERIPHERAL VASCULAR DISEASE (H): ICD-10-CM

## 2018-05-07 PROCEDURE — 99214 OFFICE O/P EST MOD 30 MIN: CPT | Performed by: INTERNAL MEDICINE

## 2018-05-07 ASSESSMENT — PAIN SCALES - GENERAL: PAINLEVEL: NO PAIN (0)

## 2018-05-07 NOTE — MR AVS SNAPSHOT
After Visit Summary   5/7/2018    Joseph Cardona    MRN: [id number]           Patient Information     Date Of Birth          1950        Visit Information        Provider Department      5/7/2018 10:20 AM Kyle López MD [facility]        Today's Diagnoses     Coronary artery disease involving native coronary artery without angina pectoris, unspecified whether native or transplanted heart    -  1    Peripheral vascular disease (H)          Care Instructions    At [facility], we strive to deliver an exceptional experience to you, every time we see you.  If you receive a survey in the mail, please send us back your thoughts. We really do value your feedback.    Based on your medical history, these are the current health maintenance/preventive care services that you are due for (some may have been done at this visit.)  Health Maintenance Due   Topic Date Due     COPD ACTION PLAN Q1 YR  1950     EYE EXAM Q1 YEAR  02/16/1949     FOOT EXAM Q1 YEAR  07/29/2017         Suggested websites for health information:  Www.[employer].Etelos : Up to date and easily searchable information on multiple topics.  Www.medlineplus.gov : medication info, interactive tutorials, watch real surgeries online  Www.familydoctor.org : good info from the Academy of Family Physicians  Www.cdc.gov : public health info, travel advisories, epidemics (H1N1)  Www.aap.org : children's health info, normal development, vaccinations  Www.health[place] : [place] dept of health, public health issues in [place], N1N1    Your care team:                            Family Medicine Internal Medicine   MD Kyle Mario MD Shantel Branch-Fleming, MD Katya Georgiev PA-C Nam Ho, MD Pediatrics   RONI Ricci CNP Amelia Massimini APRN CNP Shaista Malik, MD Bethany Templen, MD Deborah Mielke, MD Kim Thein, APRN CNP      Clinic hours: Monday -  "Thursday 7 am-7 pm;  7 am-5 pm.   Urgent care: Monday - Friday 11 am-9 pm; Saturday and  9 am-5 pm.  Pharmacy : Monday -Thursday 8 am-8 pm; Friday 8 am-6 pm; Saturday and  9 am-5 pm.     Clinic: [phone]   Pharmacy: [phone]            Follow-ups after your visit        Who to contact     If you have questions or need follow up information about today's clinic visit or your schedule please contact [facility] directly at [phone].  Normal or non-critical lab and imaging results will be communicated to you by MyChart, letter or phone within 4 business days after the clinic has received the results. If you do not hear from us within 7 days, please contact the clinic through [employer] or phone. If you have a critical or abnormal lab result, we will notify you by phone as soon as possible.  Submit refill requests through [employer] or call your pharmacy and they will forward the refill request to us. Please allow 3 business days for your refill to be completed.          Additional Information About Your Visit        MyChart Information     [employer] lets you send messages to your doctor, view your test results, renew your prescriptions, schedule appointments and more. To sign up, go to [email or web address] . Click on \"Log in\" on the left side of the screen, which will take you to the Welcome page. Then click on \"Sign up Now\" on the right side of the page.     You will be asked to enter the access code listed below, as well as some personal information. Please follow the directions to create your username and password.     Your access code is: [email or web address]  Expires: 2018 10:45 AM     Your access code will  in 90 days. If you need help or a new code, please call your [facility] clinic or [phone].        Care EveryWhere ID     This is your Care EveryWhere ID. This could be used by other organizations to access your [facility] medical records  [id number]      " "  Your Vitals Were     Pulse Temperature Respirations Height Pulse Oximetry BMI (Body Mass Index)    87 97.7  F (36.5  C) (Oral) 12 5' 10\" (1.778 m) 96% 25.97 kg/m2       Blood Pressure from Last 3 Encounters:   05/07/18 99/63   04/25/18 128/74   02/19/18 154/80    Weight from Last 3 Encounters:   05/07/18 181 lb (82.1 kg)   04/25/18 189 lb (85.7 kg)   02/02/18 188 lb (85.3 kg)              Today, you had the following     No orders found for display         Today's Medication Changes          These changes are accurate as of 5/7/18 10:46 AM.  If you have any questions, ask your nurse or doctor.               These medicines have changed or have updated prescriptions.        Dose/Directions    carvedilol 6.25 MG tablet   Commonly known as:  COREG   This may have changed:    - how much to take  - additional instructions   Used for:  Essential hypertension with goal blood pressure less than 140/90, Coronary artery disease of native heart with stable angina pectoris, unspecified vessel or lesion type (H)        TAKE 1 TABLET BY MOUTH TWO TIMES DAILY WITH MEALS   Quantity:  180 tablet   Refills:  3       losartan 50 MG tablet   Commonly known as:  COZAAR   This may have changed:  additional instructions   Used for:  Essential hypertension with goal blood pressure less than 140/90        Dose:  50 mg   Take 1 tablet (50 mg) by mouth daily   Quantity:  90 tablet   Refills:  3                Primary Care Provider Office Phone # Fax #    Kyle López [phone]       [address]  [facility] [address]        Equal Access to Services     [facility]: Hadii myrtle perezo Solora, waaxda luqadaha, qaybta kaalmada adeegmega, waxay idiin hayaan adeeg kharash la'aan . So [facility] [phone].    ATENCIÓN: Si habla español, tiene a murry disposición servicios gratuitos de asistencia lingüística. Llame al [phone].    We comply with applicable federal civil rights laws and [place] laws. We do " not discriminate on the basis of race, color, national origin, age, disability, sex, sexual orientation, or gender identity.            Thank you!     Thank you for choosing [facility]  for your care. Our goal is always to provide you with excellent care. Hearing back from our patients is one way we can continue to improve our services. Please take a few minutes to complete the written survey that you may receive in the mail after your visit with us. Thank you!             Your Updated Medication List - Protect others around you: Learn how to safely use, store and throw away your medicines at www.disposemymeds.org.          This list is accurate as of 5/7/18 10:46 AM.  Always use your most recent med list.                   Brand Name Dispense Instructions for use Diagnosis    albuterol 108 (90 Base) MCG/ACT Inhaler    VENTOLIN HFA    1 Inhaler    Inhale 2 puffs into the lungs every 4 hours as needed for shortness of breath / dyspnea or wheezing    COPD, mild (H)       aspirin 81 MG chewable tablet      81 mg daily        atorvastatin 40 MG tablet    LIPITOR    90 tablet    Take 1 tablet (40 mg) by mouth daily    Coronary artery disease of native heart with stable angina pectoris, unspecified vessel or lesion type (H)       carvedilol 6.25 MG tablet    COREG    180 tablet    TAKE 1 TABLET BY MOUTH TWO TIMES DAILY WITH MEALS    Essential hypertension with goal blood pressure less than 140/90, Coronary artery disease of native heart with stable angina pectoris, unspecified vessel or lesion type (H)       clopidogrel 75 MG tablet    PLAVIX    90 tablet    Take 1 tablet (75 mg) by mouth daily    Coronary artery disease of native heart with stable angina pectoris, unspecified vessel or lesion type (H)       furosemide 20 MG tablet    LASIX    90 tablet    Take 1 tablet (20 mg) by mouth daily    Essential hypertension with goal blood pressure less than 140/90       losartan 50 MG tablet    COZAAR    90  tablet    Take 1 tablet (50 mg) by mouth daily    Essential hypertension with goal blood pressure less than 140/90       nitroGLYcerin 0.4 MG sublingual tablet    NITROSTAT    25 tablet    PLACE 1 TABLET UNDER THE TONGUE EVERY 5 MINUTES AS NEEDED FOR CHEST PAIN.    Coronary artery disease of native heart with stable angina pectoris, unspecified vessel or lesion type (H)       ranitidine 150 MG tablet    ZANTAC    180 tablet    Take 1 tablet (150 mg) by mouth 2 times daily    Gastroesophageal reflux disease without esophagitis       umeclidinium 62.5 MCG/INH oral inhaler    INCRUSE ELLIPTA    1 Inhaler    Inhale 1 puff into the lungs daily    COPD, mild (H)       zolpidem 5 MG tablet    AMBIEN    30 tablet    Take 1 tablet (5 mg) by mouth nightly as needed    Persistent insomnia

## 2018-05-07 NOTE — PROGRESS NOTES
SUBJECTIVE:   Joseph Cardona is a 68 year old male who presents to clinic today for the following health issues:        Hospital Follow-up Visit:    Hospital/Nursing Home/ Rehab Facility: [facility]  Date of Admission: 5-2-18  Date of Discharge: 5-6-18  Reason(s) for Admission: heart attack            Problems taking medications regularly:  None       Medication changes since discharge: None       Problems adhering to non-medication therapy:  None    Summary of hospitalization:  CareEverywhere information obtained and reviewed  Diagnostic Tests/Treatments reviewed.  Follow up needed: YES.  Other Healthcare Providers Involved in Patient s Care:         None  Update since discharge: improved.     Post Discharge Medication Reconciliation: discharge medications reconciled, continue medications without change.  Plan of care communicated with patient     Coding guidelines for this visit:  Type of Medical   Decision Making Face-to-Face Visit       within 7 Days of discharge Face-to-Face Visit        within 14 days of discharge   Moderate Complexity [id number]   High Complexity [id number]              Vascular Disease Follow-up:  Coronary Artery and Peripheral Vascular Disease      Chest pain or pressure, left side neck or arm pain: Yes, seen last 4/25/18 by this provider. He was sent to ER after his troponin I is abnormal.    Shortness of breath/increased sweats/nausea with exertion: None since hospital discharge    Pain in calves walking 1-2 blocks: Yes, patient has upcoming appointment with hs Vascular team.    Worsened or new symptoms since last visit: No    Nitroglycerin use: none since hospital discharge.  Daily aspirin use: Yes, along with Plavix.    Problem list and histories reviewed & adjusted, as indicated.  Additional history: as documented    Patient Active Problem List   Diagnosis     Hyperlipidemia LDL goal <70     Essential hypertension     Advanced directives, counseling/discussion     COPD,  mild (H)     Tinea pedis of left foot     Coronary artery disease involving native coronary artery of native heart without angina pectoris     Positive hepatitis C antibody test     Tobacco dependence syndrome     Paroxysmal atrial fibrillation (H)     Intermittent claudication (H)     Peripheral vascular disease (H)     S/P insertion of iliac artery stent     Past Surgical History:   Procedure Laterality Date     CARDIAC SURGERY      Bypass double and quadruple     EYE SURGERY      cataract surgery     HEAD & NECK SURGERY      Caratid surgery     VASCULAR SURGERY         Social History   Substance Use Topics     Smoking status: Former Smoker     Smokeless tobacco: Never Used      Comment: quit 2 years ago     Alcohol use 0.0 oz/week     0 Standard drinks or equivalent per week     History reviewed. No pertinent family history.      Current Outpatient Prescriptions   Medication Sig Dispense Refill     albuterol (VENTOLIN HFA) 108 (90 BASE) MCG/ACT inhaler Inhale 2 puffs into the lungs every 4 hours as needed for shortness of breath / dyspnea or wheezing 1 Inhaler 11     aspirin 81 MG chewable tablet 81 mg daily       atorvastatin (LIPITOR) 40 MG tablet Take 1 tablet (40 mg) by mouth daily 90 tablet 3     carvedilol (COREG) 6.25 MG tablet TAKE 1 TABLET BY MOUTH TWO TIMES DAILY WITH MEALS (Patient taking differently: 6.25 mg TAKE 1 1/2 TABLETS  BY MOUTH TWO TIMES DAILY WITH MEALS) 180 tablet 3     clopidogrel (PLAVIX) 75 MG tablet Take 1 tablet (75 mg) by mouth daily 90 tablet 3     furosemide (LASIX) 20 MG tablet Take 1 tablet (20 mg) by mouth daily 90 tablet 3     losartan (COZAAR) 50 MG tablet Take 1 tablet (50 mg) by mouth daily (Patient taking differently: Take 50 mg by mouth daily Take 1/2 tablet once a day.) 90 tablet 3     nitroGLYcerin (NITROSTAT) 0.4 MG sublingual tablet PLACE 1 TABLET UNDER THE TONGUE EVERY 5 MINUTES AS NEEDED FOR CHEST PAIN. 25 tablet 1     ranitidine (ZANTAC) 150 MG tablet Take 1 tablet  (150 mg) by mouth 2 times daily 180 tablet 3     umeclidinium (INCRUSE ELLIPTA) 62.5 MCG/INH oral inhaler Inhale 1 puff into the lungs daily 1 Inhaler 11     zolpidem (AMBIEN) 5 MG tablet Take 1 tablet (5 mg) by mouth nightly as needed 30 tablet 5     Allergies   Allergen Reactions     Lisinopril Cough     Recent Labs   Lab Test  04/25/18   1118  02/02/18   1130  06/26/17   1154   11/08/16   1139  07/14/16   1043   11/17/15   1212   A1C  5.7*   --   5.6   --    --   5.4   < >   --    LDL   --   66  58   --   78   --    --    --    HDL   --   39*  36*   --   36*   --    --    --    TRIG   --   72  77   --   50   --    --    --    ALT   --   17  15   --   17   --    < >   --    CR   --   1.13  1.03   < >  0.97   --    < >   --    GFRESTIMATED   --   64  72   < >  77   --    < >   --    GFRESTBLACK   --   78  87   < >  >90   GFR Calc     --    < >   --    POTASSIUM   --   4.5  4.6   --   4.4   --    < >   --    TSH  2.60   --    --    --    --    --    --   4.71*    < > = values in this interval not displayed.      BP Readings from Last 3 Encounters:   05/07/18 99/63   04/25/18 128/74   02/19/18 154/80    Wt Readings from Last 3 Encounters:   05/07/18 181 lb (82.1 kg)   04/25/18 189 lb (85.7 kg)   02/02/18 188 lb (85.3 kg)                    ROS:  CONSTITUTIONAL: NEGATIVE for fever, chills, change in weight  INTEGUMENTARY/SKIN: NEGATIVE for worrisome rashes, moles or lesions  EYES: NEGATIVE for vision changes or irritation  ENT/MOUTH: NEGATIVE for ear, mouth and throat problems  RESP: NEGATIVE for significant cough or SOB  CV: NEGATIVE for chest pain, palpitations or peripheral edema  GI: NEGATIVE for nausea, abdominal pain, heartburn, or change in bowel habits  : NEGATIVE for frequency, dysuria, or hematuria  MUSCULOSKELETAL: NEGATIVE for significant arthralgias or myalgia  NEURO: NEGATIVE for weakness, dizziness or paresthesias  ENDOCRINE: NEGATIVE for temperature intolerance, skin/hair  "changes  HEME: NEGATIVE for bleeding problems  PSYCHIATRIC: NEGATIVE for changes in mood or affect    OBJECTIVE:     BP 99/63 (BP Location: Left arm, Patient Position: Chair, Cuff Size: Adult Regular)  Pulse 87  Temp 97.7  F (36.5  C) (Oral)  Resp 12  Ht 5' 10\" (1.778 m)  Wt 181 lb (82.1 kg)  SpO2 96%  BMI 25.97 kg/m2  Body mass index is 25.97 kg/(m^2).  GENERAL: healthy, alert and no distress  EYES: Eyes grossly normal to inspection and conjunctivae and sclerae normal  HENT: normal cephalic/atraumatic and oral mucous membranes moist  NECK: no adenopathy  RESP: lungs clear to auscultation - no rales, rhonchi or wheezes  CV: regular rate and rhythm, normal S1 S2, no S3 or S4, no murmur, click or rub, no peripheral edema and peripheral pulses strong  ABDOMEN: soft, nontender, no hepatosplenomegaly, no masses and bowel sounds normal  MS: no gross musculoskeletal defects noted, no edema  SKIN: no suspicious lesions or rashes  NEURO: Normal strength and tone, mentation intact and speech normal  PSYCH: mentation appears normal, affect normal/bright  VASCULAR: Diminished dorsalis pedis artery on left side,    Diagnostic Test Results:  none     ASSESSMENT/PLAN:     (I25.395) Coronary artery disease involving coronary bypass graft of native heart with angina pectoris (H)  (primary encounter diagnosis)  Comment: S/P thrombectomy and drug-eluting stent placement to the saphenous vein graft to the RCA.  Plan: Continue Plavix indefinitely.    (I73.9) Peripheral vascular disease (H)  Comment: Patient is again complaining of intermittent claudication, mainly on his left leg.  Plan: Patient has upcoming appointment at [facility] [place].    Follow-up visit if condition worsens.    Kyle López MD  [facility]  "

## 2018-05-07 NOTE — PATIENT INSTRUCTIONS
At [facility], we strive to deliver an exceptional experience to you, every time we see you.  If you receive a survey in the mail, please send us back your thoughts. We really do value your feedback.    Based on your medical history, these are the current health maintenance/preventive care services that you are due for (some may have been done at this visit.)  Health Maintenance Due   Topic Date Due     COPD ACTION PLAN Q1 YR  1950     EYE EXAM Q1 YEAR  02/16/1951     FOOT EXAM Q1 YEAR  07/29/2017         Suggested websites for health information:  Www.[employer] : Up to date and easily searchable information on multiple topics.  Www.[employer].gov : medication info, interactive tutorials, watch real surgeries online  Www.familydoctor.org : good info from the Academy of Family Physicians  Www.cdc.gov : public health info, travel advisories, epidemics (H1N1)  Www.aap.org : children's health info, normal development, vaccinations  Www.health.[facility].[place].us : [place] dept of health, public health issues in [place], N1N1    Your care team:                            Family Medicine Internal Medicine   MD Kyle Mario MD Shantel Branch-Fleming, MD Katya Georgiev PA-C Nam Ho, MD Pediatrics   RONI Ricci, MARLENE George APRN CNP   MD Rohini Birmingham MD Deborah Mielke, MD Kim Thein, APRN CNP      Clinic hours: Monday - Thursday 7 am-7 pm; Fridays 7 am-5 pm.   Urgent care: Monday - Friday 11 am-9 pm; Saturday and Sunday 9 am-5 pm.  Pharmacy : Monday -Thursday 8 am-8 pm; Friday 8 am-6 pm; Saturday and Sunday 9 am-5 pm.     Clinic: [phone]   Pharmacy: [phone]

## 2018-05-08 NOTE — PROGRESS NOTES
Clinic Care Coordination Contact    Clinic Care Coordination Contact  OUTREACH    Referral Information:  Referral Source: [facility]    Primary Diagnosis: Cardiovascular - other    Chief Complaint   Patient presents with     Clinic Care Coordination - Post Hospital     RN CC     Care Team     Chart Review Please        Universal Utilization:   Clinic Utilization  Difficulty keeping appointments:: No  Utilization    Last refreshed: 5/8/2018  1:39 PM:  No Show Count (past year) 2       Last refreshed: 5/8/2018  1:39 PM:  ED visits 0       Last refreshed: 5/8/2018  1:39 PM:  Hospital admissions 0          Current as of: 5/8/2018  1:39 PM             Clinical Concerns:  Current Medical Concerns:  The patient was recently hospitalized at [facility] for an acute MI with placement of 5 stents.  The patient is currently attending cardiac rehab.  He endorses the fact that he has calf pain with walking 1-2 blocks and has an appointment with the vascular surgeon upcoming.  The patient agrees to have the Primary Care Coordination RN contact him in 2 weeks.       Peripheral vascular disease (H)   S/P insertion of iliac artery stent   Intermittent claudication (H)   Positive hepatitis C antibody test   Coronary artery disease involving native coronary artery of native heart without angina pectoris   COPD, mild (H)   Tinea pedis of left foot   Advanced directives, counseling/discussion   Hyperlipidemia LDL goal <70   Essential hypertension   Tobacco dependence syndrome   Paroxysmal atrial fibrillation (H)        Current Behavioral Concerns: none noted    Education Provided to patient: offerings of care coordination.   Pain  Chronic pain (GOAL):: No  Health Maintenance Reviewed: Due/Overdue   Clinical Pathway: None    Medication Management:  Patient is knowledgeable on medications and is adherent.  No financial concerns reported at this time.       Functional Status:  Bed or wheelchair confined:: No  Mobility Status:  Independent  Fallen 2 or more times in the past year?: No  Any fall with injury in the past year?: No    Living Situation:  Current living arrangement:: I live in a private home with spouse  Type of residence:: Private home - no stairs    Diet/Exercise/Sleep:  Diet:: Regular  Inadequate nutrition (GOAL):: No  Food Insecurity: No  Tube Feeding: No  Exercise:: Yes  How intense was your typical exercise? : Light (like stretching or slow walking)  Inadequate activity/exercise (GOAL):: No  Significant changes in sleep pattern (GOAL): No    Transportation:  Transportation concerns (GOAL):: No  Transportation means:: Regular car     Psychosocial:  Gnosticism or spiritual beliefs that impact treatment:: No  Mental health DX:: No  Mental health management concern (GOAL):: No  Informal Support system:: Spouse     Financial/Insurance:   Financial/Insurance concerns (GOAL):: No  [facility]     Resources and Interventions:  Current Resources:    ;   Community Resources: Cardiac Rehab     Equipment Currently Used at Home: none    Advance Care Plan/Directive  Advanced Care Plans/Directives on file:: No  Advanced Care Plan/Directive Status: Not Applicable    Patient/Caregiver understanding: The patient has a fair understanding of the disease process.    Outreach Frequency: 2 weeks      Plan: 1).  The patient will take all medications as prescribed by the providers.  2).   The patient will make and attend all follow up appointments with the PCP and the specialists.   3).   The Primary Care Coordination RN will make a follow up call to the patient again in 2 weeks.  4).   Care Coordination to remain available for the patient to contact in the event of future needs.        Vikram Shelton, MSN, RN, PHN I Clinic Care Coordinator  [facility]  Phone: [phone]  [email or web address]

## 2018-05-11 ENCOUNTER — TRANSFERRED RECORDS (OUTPATIENT)
Dept: HEALTH INFORMATION MANAGEMENT | Facility: CLINIC | Age: 68
End: 2018-05-11

## 2018-05-11 LAB — PHQ9 SCORE: 6

## 2018-05-15 ENCOUNTER — TRANSFERRED RECORDS (OUTPATIENT)
Dept: HEALTH INFORMATION MANAGEMENT | Facility: CLINIC | Age: 68
End: 2018-05-15

## 2018-05-21 ENCOUNTER — TRANSFERRED RECORDS (OUTPATIENT)
Dept: HEALTH INFORMATION MANAGEMENT | Facility: CLINIC | Age: 68
End: 2018-05-21

## 2018-05-30 ENCOUNTER — TRANSFERRED RECORDS (OUTPATIENT)
Dept: HEALTH INFORMATION MANAGEMENT | Facility: CLINIC | Age: 68
End: 2018-05-30

## 2018-06-01 ENCOUNTER — OFFICE VISIT (OUTPATIENT)
Dept: FAMILY MEDICINE | Facility: CLINIC | Age: 68
End: 2018-06-01
Payer: COMMERCIAL

## 2018-06-01 VITALS
BODY MASS INDEX: 25.62 KG/M2 | WEIGHT: 179 LBS | RESPIRATION RATE: 12 BRPM | SYSTOLIC BLOOD PRESSURE: 88 MMHG | HEIGHT: 70 IN | DIASTOLIC BLOOD PRESSURE: 55 MMHG | TEMPERATURE: 98.3 F | OXYGEN SATURATION: 95 % | HEART RATE: 80 BPM

## 2018-06-01 DIAGNOSIS — Z01.818 PREOP GENERAL PHYSICAL EXAM: Primary | ICD-10-CM

## 2018-06-01 LAB
ALBUMIN SERPL-MCNC: 3.6 G/DL (ref 3.4–5)
ALP SERPL-CCNC: 58 U/L (ref 40–150)
ALT SERPL W P-5'-P-CCNC: 14 U/L (ref 0–70)
ANION GAP SERPL CALCULATED.3IONS-SCNC: 9 MMOL/L (ref 3–14)
AST SERPL W P-5'-P-CCNC: 14 U/L (ref 0–45)
BASOPHILS # BLD AUTO: 0 10E9/L (ref 0–0.2)
BASOPHILS NFR BLD AUTO: 0.4 %
BILIRUB SERPL-MCNC: 1 MG/DL (ref 0.2–1.3)
BUN SERPL-MCNC: 22 MG/DL (ref 7–30)
CALCIUM SERPL-MCNC: 8.8 MG/DL (ref 8.5–10.1)
CHLORIDE SERPL-SCNC: 103 MMOL/L (ref 94–109)
CO2 SERPL-SCNC: 27 MMOL/L (ref 20–32)
CREAT SERPL-MCNC: 1.04 MG/DL (ref 0.66–1.25)
DIFFERENTIAL METHOD BLD: ABNORMAL
EOSINOPHIL # BLD AUTO: 0.2 10E9/L (ref 0–0.7)
EOSINOPHIL NFR BLD AUTO: 2.9 %
ERYTHROCYTE [DISTWIDTH] IN BLOOD BY AUTOMATED COUNT: 12.9 % (ref 10–15)
GFR SERPL CREATININE-BSD FRML MDRD: 71 ML/MIN/1.7M2
GLUCOSE SERPL-MCNC: 117 MG/DL (ref 70–99)
HCT VFR BLD AUTO: 40.1 % (ref 40–53)
HGB BLD-MCNC: 13.4 G/DL (ref 13.3–17.7)
INR PPP: 1.14 (ref 0.86–1.14)
LYMPHOCYTES # BLD AUTO: 1 10E9/L (ref 0.8–5.3)
LYMPHOCYTES NFR BLD AUTO: 14.7 %
MCH RBC QN AUTO: 31.2 PG (ref 26.5–33)
MCHC RBC AUTO-ENTMCNC: 33.4 G/DL (ref 31.5–36.5)
MCV RBC AUTO: 93 FL (ref 78–100)
MONOCYTES # BLD AUTO: 0.7 10E9/L (ref 0–1.3)
MONOCYTES NFR BLD AUTO: 10.6 %
NEUTROPHILS # BLD AUTO: 5 10E9/L (ref 1.6–8.3)
NEUTROPHILS NFR BLD AUTO: 71.4 %
PLATELET # BLD AUTO: 203 10E9/L (ref 150–450)
POTASSIUM SERPL-SCNC: 4.1 MMOL/L (ref 3.4–5.3)
PROT SERPL-MCNC: 6.7 G/DL (ref 6.8–8.8)
RBC # BLD AUTO: 4.3 10E12/L (ref 4.4–5.9)
SODIUM SERPL-SCNC: 139 MMOL/L (ref 133–144)
WBC # BLD AUTO: 7 10E9/L (ref 4–11)

## 2018-06-01 PROCEDURE — 36415 COLL VENOUS BLD VENIPUNCTURE: CPT | Performed by: INTERNAL MEDICINE

## 2018-06-01 PROCEDURE — 99215 OFFICE O/P EST HI 40 MIN: CPT | Performed by: INTERNAL MEDICINE

## 2018-06-01 PROCEDURE — 80053 COMPREHEN METABOLIC PANEL: CPT | Performed by: INTERNAL MEDICINE

## 2018-06-01 PROCEDURE — 85610 PROTHROMBIN TIME: CPT | Performed by: INTERNAL MEDICINE

## 2018-06-01 PROCEDURE — 85025 COMPLETE CBC W/AUTO DIFF WBC: CPT | Performed by: INTERNAL MEDICINE

## 2018-06-01 PROCEDURE — 93000 ELECTROCARDIOGRAM COMPLETE: CPT | Performed by: INTERNAL MEDICINE

## 2018-06-01 ASSESSMENT — PAIN SCALES - GENERAL: PAINLEVEL: MILD PAIN (3)

## 2018-06-01 NOTE — MR AVS SNAPSHOT
After Visit Summary   6/1/2018    Joseph Cardona    MRN: [id number]           Patient Information     Date Of Birth          1950        Visit Information        Provider Department      6/1/2018 11:20 AM Kyle López MD [facility]        Today's Diagnoses     Preop general physical exam    -  1      Care Instructions      Before Your Surgery      Call your surgeon if there is any change in your health. This includes signs of a cold or flu (such as a sore throat, runny nose, cough, rash or fever).    Do not smoke, drink alcohol or take over the counter medicine (unless your surgeon or primary care doctor tells you to) for the 24 hours before and after surgery.    If you take prescribed drugs: Follow your doctor s orders about which medicines to take and which to stop until after surgery.    Eating and drinking prior to surgery: follow the instructions from your surgeon    Take a shower or bath the night before surgery. Use the soap your surgeon gave you to gently clean your skin. If you do not have soap from your surgeon, use your regular soap. Do not shave or scrub the surgery site.  Wear clean pajamas and have clean sheets on your bed.     At [facility], we strive to deliver an exceptional experience to you, every time we see you.  If you receive a survey in the mail, please send us back your thoughts. We really do value your feedback.    Based on your medical history, these are the current health maintenance/preventive care services that you are due for (some may have been done at this visit.)  Health Maintenance Due   Topic Date Due     COPD ACTION PLAN Q1 YR  1950     EYE EXAM Q1 YEAR  02/16/1949     FOOT EXAM Q1 YEAR  07/29/2017         Suggested websites for health information:  Www.[employer] [facility].org : Up to date and easily searchable information on multiple topics.  Www.[employer].gov : medication info, interactive tutorials, watch  real surgeries online  Www.familydoctor.org : good info from the Academy of Family Physicians  Www.cdc.gov : public health info, travel advisories, epidemics (H1N1)  Www.aap.org : children's health info, normal development, vaccinations  Www.health.state.[place].us : [place] dept of health, public health issues in [place], N1N1    Your care team:                            Family Medicine Internal Medicine   MD Kyle Mario MD Shantel Branch-Fleming, MD Katya Georgiev PA-C Nam Ho, MD Pediatrics   RONI Ricci, MARLENE George APRN CNP   MD Rohini Birmingham MD Deborah Mielke, MD Kim Thein, APRN CNP      Clinic hours: Monday - Thursday 7 am-7 pm; Fridays 7 am-5 pm.   Urgent care: Monday - Friday 11 am-9 pm; Saturday and Sunday 9 am-5 pm.  Pharmacy : Monday -Thursday 8 am-8 pm; Friday 8 am-6 pm; Saturday and Sunday 9 am-5 pm.     Clinic: [phone]   Pharmacy: [phone]            Follow-ups after your visit        Who to contact     If you have questions or need follow up information about today's clinic visit or your schedule please contact [facility] directly at [phone].  Normal or non-critical lab and imaging results will be communicated to you by MyChart, letter or phone within 4 business days after the clinic has received the results. If you do not hear from us within 7 days, please contact the clinic through [employer] In[employer] or phone. If you have a critical or abnormal lab result, we will notify you by phone as soon as possible.  Submit refill requests through [employer] or call your pharmacy and they will forward the refill request to us. Please allow 3 business days for your refill to be completed.          Additional Information About Your Visit        MyChart Information     [employer] lets you send messages to your doctor, view your test results, renew your prescriptions, schedule appointments and more. To sign up, go to  [email or web address] . Click on \"Log in\" on the left side of the screen, which will take you to the Welcome page. Then click on \"Sign up Now\" on the right side of the page.     You will be asked to enter the access code listed below, as well as some personal information. Please follow the directions to create your username and password.     Your access code is: [email or web address]  Expires: 2018 10:45 AM     Your access code will  in 90 days. If you need help or a new code, please call your [place] clinic or [phone].        Care EveryWhere ID     This is your Care EveryWhere ID. This could be used by other organizations to access your [place] medical records  [id number]        Your Vitals Were     Pulse Temperature Respirations Height Pulse Oximetry BMI (Body Mass Index)    80 98.3  F (36.8  C) (Oral) 12 5' 10\" (1.778 m) 95% 25.68 kg/m2       Blood Pressure from Last 3 Encounters:   18 (!) 88/55   18 99/63   18 128/74    Weight from Last 3 Encounters:   18 179 lb (81.2 kg)   18 181 lb (82.1 kg)   18 189 lb (85.7 kg)              We Performed the Following     CBC with platelets and differential     Comprehensive metabolic panel (BMP + Alb, Alk Phos, ALT, AST, Total. Bili, TP)     EKG 12-lead complete w/read - Clinics     INR          Today's Medication Changes          These changes are accurate as of 18 11:54 AM.  If you have any questions, ask your nurse or doctor.               These medicines have changed or have updated prescriptions.        Dose/Directions    carvedilol 6.25 MG tablet   Commonly known as:  COREG   This may have changed:    - how much to take  - additional instructions   Used for:  Essential hypertension with goal blood pressure less than 140/90, Coronary artery disease of native heart with stable angina pectoris, unspecified vessel or lesion type (H)        TAKE 1 TABLET BY MOUTH TWO TIMES DAILY WITH MEALS   Quantity:  180 tablet "   Refills:  3       losartan 50 MG tablet   Commonly known as:  COZAAR   This may have changed:  additional instructions   Used for:  Essential hypertension with goal blood pressure less than 140/90        Dose:  50 mg   Take 1 tablet (50 mg) by mouth daily   Quantity:  90 tablet   Refills:  3                Primary Care Provider Office Phone # Fax #    Kylezoe López [phone]       [address]  [facility] [address]        Equal Access to Services     [facility] : Hadii aad ku hadasho Soomaali, waaxda luqadaha, qaybta kaalmada adeegyada, waxay idiin hayaan adeeg kharash la'onhemi . So [facility] [phone].    ATENCIÓN: Si habla español, tiene a murry disposición servicios gratuitos de asistencia lingüística. [facility] [phone].    We comply with applicable federal civil rights laws and [place] laws. We do not discriminate on the basis of race, color, national origin, age, disability, sex, sexual orientation, or gender identity.            Thank you!     Thank you for choosing [facility]  for your care. Our goal is always to provide you with excellent care. Hearing back from our patients is one way we can continue to improve our services. Please take a few minutes to complete the written survey that you may receive in the mail after your visit with us. Thank you!             Your Updated Medication List - Protect others around you: Learn how to safely use, store and throw away your medicines at www.disposemymeds.org.          This list is accurate as of 6/1/18 11:54 AM.  Always use your most recent med list.                   Brand Name Dispense Instructions for use Diagnosis    albuterol 108 (90 Base) MCG/ACT Inhaler    VENTOLIN HFA    1 Inhaler    Inhale 2 puffs into the lungs every 4 hours as needed for shortness of breath / dyspnea or wheezing    COPD, mild (H)       aspirin 81 MG chewable tablet      81 mg daily        atorvastatin 40 MG tablet    LIPITOR    90  tablet    Take 1 tablet (40 mg) by mouth daily    Coronary artery disease of native heart with stable angina pectoris, unspecified vessel or lesion type (H)       carvedilol 6.25 MG tablet    COREG    180 tablet    TAKE 1 TABLET BY MOUTH TWO TIMES DAILY WITH MEALS    Essential hypertension with goal blood pressure less than 140/90, Coronary artery disease of native heart with stable angina pectoris, unspecified vessel or lesion type (H)       clopidogrel 75 MG tablet    PLAVIX    90 tablet    Take 1 tablet (75 mg) by mouth daily    Coronary artery disease of native heart with stable angina pectoris, unspecified vessel or lesion type (H)       furosemide 20 MG tablet    LASIX    90 tablet    Take 1 tablet (20 mg) by mouth daily    Essential hypertension with goal blood pressure less than 140/90       losartan 50 MG tablet    COZAAR    90 tablet    Take 1 tablet (50 mg) by mouth daily    Essential hypertension with goal blood pressure less than 140/90       nitroGLYcerin 0.4 MG sublingual tablet    NITROSTAT    25 tablet    PLACE 1 TABLET UNDER THE TONGUE EVERY 5 MINUTES AS NEEDED FOR CHEST PAIN.    Coronary artery disease of native heart with stable angina pectoris, unspecified vessel or lesion type (H)       PROZAC PO           ranitidine 150 MG tablet    ZANTAC    180 tablet    Take 1 tablet (150 mg) by mouth 2 times daily    Gastroesophageal reflux disease without esophagitis       umeclidinium 62.5 MCG/INH oral inhaler    INCRUSE ELLIPTA    1 Inhaler    Inhale 1 puff into the lungs daily    COPD, mild (H)       zolpidem 5 MG tablet    AMBIEN    30 tablet    Take 1 tablet (5 mg) by mouth nightly as needed    Persistent insomnia

## 2018-06-01 NOTE — PATIENT INSTRUCTIONS
Before Your Surgery      Call your surgeon if there is any change in your health. This includes signs of a cold or flu (such as a sore throat, runny nose, cough, rash or fever).    Do not smoke, drink alcohol or take over the counter medicine (unless your surgeon or primary care doctor tells you to) for the 24 hours before and after surgery.    If you take prescribed drugs: Follow your doctor s orders about which medicines to take and which to stop until after surgery.    Eating and drinking prior to surgery: follow the instructions from your surgeon    Take a shower or bath the night before surgery. Use the soap your surgeon gave you to gently clean your skin. If you do not have soap from your surgeon, use your regular soap. Do not shave or scrub the surgery site.  Wear clean pajamas and have clean sheets on your bed.     At [facility], we strive to deliver an exceptional experience to you, every time we see you.  If you receive a survey in the mail, please send us back your thoughts. We really do value your feedback.    Based on your medical history, these are the current health maintenance/preventive care services that you are due for (some may have been done at this visit.)  Health Maintenance Due   Topic Date Due     COPD ACTION PLAN Q1 YR  1950     EYE EXAM Q1 YEAR  02[birth year]16[birth year]1949     FOOT EXAM Q1 YEAR  07/29/2017         Suggested websites for health information:  Www.[place].org : Up to date and easily searchable information on multiple topics.  Www.medlineplus.gov : medication info, interactive tutorials, watch real surgeries online  Www.familydoctor.org : good info from the Academy of Family Physicians  Www.cdc.gov : public health info, travel advisories, epidemics (H1N1)  Www.aap.org : children's health info, normal development, vaccinations  Www.health.state.[place].us : [place] dept of health, public health issues in [place], N1N1    Your care team:                            Family Medicine  Internal Medicine   MD Kyel Mario MD Shantel Branch-Fleming, MD Katya Georgiev PA-C Nam Ho, MD Pediatrics   RONI Ricci, MD Rohini Lanza CNP, MD Deborah Mielke, MD Kim Thein, APRN CNP      Clinic hours: Monday - Thursday 7 am-7 pm; Fridays 7 am-5 pm.   Urgent care: Monday - Friday 11 am-9 pm; Saturday and Sunday 9 am-5 pm.  Pharmacy : Monday -Thursday 8 am-8 pm; Friday 8 am-6 pm; Saturday and Sunday 9 am-5 pm.     Clinic: [phone]   Pharmacy: [phone]

## 2018-06-01 NOTE — PROGRESS NOTES
[address] [address]  [phone]  Dept: [phone]    PRE-OP EVALUATION:  Today's date: 2018    Joseph Cardona (: 1950) presents for pre-operative evaluation assessment as requested by Dr. Corona.  He requires evaluation and anesthesia risk assessment prior to undergoing surgery/procedure for treatment of left leg .    Proposed Surgery/ Procedure: left leg bypass  Date of Surgery/ Procedure: 18  Time of Surgery/ Procedure: 10:30  Hospital/Surgical Facility: [facility]  Fax number for surgical facility: [phone]  Primary Physician: Kyle López  Type of Anesthesia Anticipated: to be determined    Patient has a Health Care Directive or Living Will:  NO    1. YES - DO YOU HAVE A HISTORY OF HEART ATTACK, STROKE, STENT, BYPASS OR SURGERY ON AN ARTERY IN THE HEAD, NECK, HEART OR LEG?   2. YES - DO YOU EVER HAVE ANY PAIN OR DISCOMFORT IN YOUR CHEST?   3. YES - DO YOU HAVE A HISTORY OF HEART FAILURE   4. YES - ARE YOUR TROUBLED BY SHORTNESS OF BREATH WHEN WALKING ON THE LEVEL, UP A SLIGHT HILL OR AT NIGHT?   5. NO - Do you currently have a cold, bronchitis or other respiratory infection?  6. NO - Do you have a cough, shortness of breath or wheezing?  7. YES - DO YOU SOMETIMES GET PAINS IN THE CALVES OF YOUR LEGS WHEN YOU WALK?   8. NO - Do you or anyone in your family have previous history of blood clots?  9. NO - Do you or does anyone in your family have a serious bleeding problem such as prolonged bleeding following surgeries or cuts?  10. NO - Have you ever had problems with anemia or been told to take iron pills?  11. NO - Have you had any abnormal blood loss such as black, tarry or bloody stools, or abnormal vaginal bleeding?  12. NO - Have you ever had a blood transfusion?  13. NO - Have you or any of your relatives ever had problems with anesthesia?  14. NO - Do you have sleep apnea, excessive snoring or daytime  drowsiness?  15. NO - Do you have any prosthetic heart valves?  16. NO - Do you have prosthetic joints?  17. NO - Is there any chance that you may be pregnant?      HPI:     HPI related to upcoming procedure:        This is a 68 year old male who comes in today for a preoperative evaluation. The patient is scheduled for left femoral endarterectomy on June 5, 2018.    CAD - Patient has a longstanding history of moderate-severe CAD. Patient was hospitalized last 4/25/18 at [facility] due to ST elevation MI involving left circumflex coronary artery. Mr. Cardona is currently asymptomatic.                                                                                                                                          .    MEDICAL HISTORY:     Patient Active Problem List    Diagnosis Date Noted     Peripheral vascular disease (H) 06/26/2017     Priority: Medium     S/P insertion of iliac artery stent 06/26/2017     Priority: Medium     Intermittent claudication (H) 11/09/2016     Priority: Medium     Positive hepatitis C antibody test 06/10/2016     Priority: Medium     Coronary artery disease involving native coronary artery of native heart without angina pectoris 06/09/2016     Priority: Medium     COPD, mild (H) 02/09/2016     Priority: Medium     Tinea pedis of left foot 02/09/2016     Priority: Medium     Advanced directives, counseling/discussion 11/17/2015     Priority: Medium     Patient dont have HCD, declined to discuss at this time  Funmi King MA         Hyperlipidemia LDL goal <70 08/14/2015     Priority: Medium     Problem list name updated by automated process. Provider to review       Essential hypertension 08/14/2015     Priority: Medium     Tobacco dependence syndrome 08/14/2015     Priority: Medium     Paroxysmal atrial fibrillation (H) 08/14/2015     Priority: Medium      Past Medical History:   Diagnosis Date     Heart disease      Hypertension      Past Surgical History:   Procedure  Laterality Date     CARDIAC SURGERY      Bypass double and quadruple     EYE SURGERY      cataract surgery     HEAD & NECK SURGERY      Caratid surgery     VASCULAR SURGERY       Current Outpatient Prescriptions   Medication Sig Dispense Refill     albuterol (VENTOLIN HFA) 108 (90 BASE) MCG/ACT inhaler Inhale 2 puffs into the lungs every 4 hours as needed for shortness of breath / dyspnea or wheezing 1 Inhaler 11     aspirin 81 MG chewable tablet 81 mg daily       atorvastatin (LIPITOR) 40 MG tablet Take 1 tablet (40 mg) by mouth daily 90 tablet 3     carvedilol (COREG) 6.25 MG tablet TAKE 1 TABLET BY MOUTH TWO TIMES DAILY WITH MEALS (Patient taking differently: 6.25 mg TAKE 1 1/2 TABLETS  BY MOUTH TWO TIMES DAILY WITH MEALS) 180 tablet 3     clopidogrel (PLAVIX) 75 MG tablet Take 1 tablet (75 mg) by mouth daily 90 tablet 3     furosemide (LASIX) 20 MG tablet Take 1 tablet (20 mg) by mouth daily 90 tablet 3     losartan (COZAAR) 50 MG tablet Take 1 tablet (50 mg) by mouth daily (Patient taking differently: Take 50 mg by mouth daily Take 1/2 tablet once a day.) 90 tablet 3     nitroGLYcerin (NITROSTAT) 0.4 MG sublingual tablet PLACE 1 TABLET UNDER THE TONGUE EVERY 5 MINUTES AS NEEDED FOR CHEST PAIN. 25 tablet 1     ranitidine (ZANTAC) 150 MG tablet Take 1 tablet (150 mg) by mouth 2 times daily 180 tablet 3     umeclidinium (INCRUSE ELLIPTA) 62.5 MCG/INH oral inhaler Inhale 1 puff into the lungs daily 1 Inhaler 11     zolpidem (AMBIEN) 5 MG tablet Take 1 tablet (5 mg) by mouth nightly as needed 30 tablet 5     OTC products: Aspirin    Allergies   Allergen Reactions     Lisinopril Cough      Latex Allergy: NO    Social History   Substance Use Topics     Smoking status: Former Smoker     Smokeless tobacco: Never Used      Comment: quit 2 years ago     Alcohol use 0.0 oz/week     0 Standard drinks or equivalent per week     History   Drug Use No       REVIEW OF SYSTEMS:   CONSTITUTIONAL: NEGATIVE for fever, chills,  change in weight  INTEGUMENTARY/SKIN: NEGATIVE for worrisome rashes, moles or lesions  EYES: NEGATIVE for vision changes or irritation  ENT/MOUTH: NEGATIVE for ear, mouth and throat problems  RESP: NEGATIVE for significant cough or SOB  CV: NEGATIVE for chest pain, palpitations or peripheral edema  GI: NEGATIVE for nausea, abdominal pain, heartburn, or change in bowel habits  : NEGATIVE for frequency, dysuria, or hematuria  MUSCULOSKELETAL: NEGATIVE for significant arthralgias or myalgia  NEURO: NEGATIVE for weakness, dizziness or paresthesias  ENDOCRINE: NEGATIVE for temperature intolerance, skin/hair changes  HEME: NEGATIVE for bleeding problems  PSYCHIATRIC: NEGATIVE for changes in mood or affect    EXAM:   There were no vitals taken for this visit.    GENERAL APPEARANCE: alert and no distress     EYES: Eyes grossly normal to inspection     HENT: normal cephalic/atraumatic     NECK: no adenopathy, no asymmetry, masses, or scars and thyroid normal to palpation     RESP: lungs clear to auscultation - no rales, rhonchi or wheezes     CV: regular rates and rhythm, normal S1 S2, no S3 or S4 and no murmur, click or rub     ABDOMEN:  soft, nontender, no HSM or masses and bowel sounds normal     MS: extremities normal- no gross deformities noted, no evidence of inflammation in joints, FROM in all extremities.     SKIN: no suspicious lesions or rashes     NEURO: Normal strength and tone, sensory exam grossly normal, mentation intact and speech normal     PSYCH: mentation appears normal. and affect normal/bright    DIAGNOSTICS:   EKG: Sinus rhythm. Nonspecific ST-T wave changes at inferior leads. Resolution of widened QRS in comparison to 4/25/18 EKG.  INR 1.14  0.86 - 1.14  Final 06/01/2018 11:57 AM     Sodium 139  133 - 144 mmol/L Final 06/01/2018 11:57 AM MG   Potassium 4.1  3.4 - 5.3 mmol/L Final 06/01/2018 11:57 AM MG   Chloride 103  94 - 109 mmol/L Final 06/01/2018 11:57 AM MG   Carbon Dioxide 27  20 - 32 mmol/L  Final 06/01/2018 11:57 AM MG   Anion Gap 9  3 - 14 mmol/L Final 06/01/2018 11:57 AM MG   Glucose 117 (H) 70 - 99 mg/dL Final 06/01/2018 11:57 AM MG   Comment:   Non Fasting   Urea Nitrogen 22  7 - 30 mg/dL Final 06/01/2018 11:57 AM MG   Creatinine 1.04  0.66 - 1.25 mg/dL Final 06/01/2018 11:57 AM MG   GFR Estimate 71  >60 mL/min/1.7m2 Final 06/01/2018 11:57 AM MG   Comment:   Non  GFR Calc   GFR Estimate If Black 85  >60 mL/min/1.7m2 Final 06/01/2018 11:57 AM MG   Comment:   African American GFR Calc   Calcium 8.8  8.5 - 10.1 mg/dL Final 06/01/2018 11:57 AM MG   Bilirubin Total 1.0  0.2 - 1.3 mg/dL Final 06/01/2018 11:57 AM MG   Albumin 3.6  3.4 - 5.0 g/dL Final 06/01/2018 11:57 AM MG   Protein Total 6.7 (L) 6.8 - 8.8 g/dL Final 06/01/2018 11:57 AM MG   Alkaline Phosphatase 58  40 - 150 U/L Final 06/01/2018 11:57 AM MG   ALT 14  0 - 70 U/L Final 06/01/2018 11:57 AM MG   AST 14  0 - 45 U/L Final 06/01/2018 11:57 AM      WBC 7.0  4.0 - 11.0 10e9/L Final 06/01/2018 11:57 AM BK   RBC Count 4.30 (L) 4.4 - 5.9 10e12/L Final 06/01/2018 11:57 AM BK   Hemoglobin 13.4  13.3 - 17.7 g/dL Final 06/01/2018 11:57 AM BK   Hematocrit 40.1  40.0 - 53.0 % Final 06/01/2018 11:57 AM BK   MCV 93  78 - 100 fl Final 06/01/2018 11:57 AM BK   MCH 31.2  26.5 - 33.0 pg Final 06/01/2018 11:57 AM BK   MCHC 33.4  31.5 - 36.5 g/dL Final 06/01/2018 11:57 AM BK   RDW 12.9  10.0 - 15.0 % Final 06/01/2018 11:57 AM BK   Platelet Count 203  150 - 450 10e9/L Final 06/01/2018 11:57 AM BK   Diff Method     Final 06/01/2018 11:57 AM BK   Automated Method   % Neutrophils 71.4   % Final 06/01/2018 11:57 AM BK   % Lymphocytes 14.7   % Final 06/01/2018 11:57 AM BK   % Monocytes 10.6   % Final 06/01/2018 11:57 AM BK   % Eosinophils 2.9   % Final 06/01/2018 11:57 AM BK   % Basophils 0.4   % Final 06/01/2018 11:57 AM BK   Absolute Neutrophil 5.0  1.6 - 8.3 10e9/L Final 06/01/2018 11:57 AM BK   Absolute Lymphocytes 1.0  0.8 - 5.3 10e9/L Final  06/01/2018 11:57 AM BK   Absolute Monocytes 0.7  0.0 - 1.3 10e9/L Final 06/01/2018 11:57 AM BK   Absolute Eosinophils 0.2  0.0 - 0.7 10e9/L Final 06/01/2018 11:57 AM BK   Absolute Basophils 0.0  0.0 - 0.2 10e9/L Final 06/01/2018 11:57 AM            IMPRESSION:   Reason for surgery/procedure:Left leg endarterectomy.  Diagnosis/reason for consult: Preoperative evaluation.    The proposed surgical procedure is considered INTERMEDIATE risk.    REVISED CARDIAC RISK INDEX  The patient has the following serious cardiovascular risks for perioperative complications such as (MI, PE, VFib and 3  AV Block):  Coronary Artery Disease (MI, positive stress test, angina, Qs on EKG)  INTERPRETATION: 1 risks: Class II (low risk - 0.9% complication rate)    The patient has the following additional risks for perioperative complications:  COPD      ICD-10-CM    1. Preop general physical exam Z01.818        RECOMMENDATIONS:     --Consult hospital rounder / IM to assist post-op medical management    --Patient is to take all scheduled medications on the day of surgery EXCEPT for aspirin and plavix.    APPROVAL GIVEN to proceed with proposed procedure, without further diagnostic evaluation       Signed Electronically by: Kyle López MD    Copy of this evaluation report is provided to requesting physician.    [place] Preop Guidelines    Revised Cardiac Risk Index

## 2018-06-04 ENCOUNTER — TELEPHONE (OUTPATIENT)
Dept: FAMILY MEDICINE | Facility: CLINIC | Age: 68
End: 2018-06-04

## 2018-06-04 NOTE — TELEPHONE ENCOUNTER
Notes Recorded by Kyle López MD on 6/4/2018 at 3:16 PM  All preoperative labs are unremarkable.  Glucose is not within diabetic range.  Low reticulocyte count is expected since patient is not anemic.  Kidney and liver function tests are normal.  Patient is cleared for surgery tomorrow.    (Call patient)     This writer attempted to contact Joseph on 06/04/18      Reason for call inform results and left message.      If patient calls back:   Patient contacted by 1st floor [place] Care Team (MA/TC). Inform patient that someone from the team will contact them, document that pt called and route to care team.         Tiffanie Champion MA

## 2018-06-04 NOTE — TELEPHONE ENCOUNTER
Patient  returned call    Best number to reach caller: Home number on file [phone] (home)    Is it ok to leave a detailed message: YES

## 2018-06-05 ENCOUNTER — TRANSFERRED RECORDS (OUTPATIENT)
Dept: HEALTH INFORMATION MANAGEMENT | Facility: CLINIC | Age: 68
End: 2018-06-05

## 2018-06-13 ENCOUNTER — PATIENT OUTREACH (OUTPATIENT)
Dept: CARE COORDINATION | Facility: CLINIC | Age: 68
End: 2018-06-13

## 2018-06-13 ASSESSMENT — ACTIVITIES OF DAILY LIVING (ADL): DEPENDENT_IADLS:: INDEPENDENT

## 2018-06-13 NOTE — LETTER
Health Care Home - Access Care Plan    About Me  Patient Name:  Joseph Cardona    YOB: 1950  Age:                             68 year old   [place] MRN:            [id number] Telephone Information:     Home Phone [phone]   Mobile [phone]       Address:    [address] [place] [address]  [place] [address] Email address:  [email or web address]      Emergency Contact(s)  Name Relationship Lgl Grd Work Phone Home Phone Mobile Phone   ESAU FAIRCHILD Spouse   [phone]              Health Maintenance: Routine Health maintenance Reviewed: Due/Overdue     My Access Plan  Medical Emergency 911   Questions or concerns during clinic hours Primary Clinic Line, I will call the clinic directly: [facility] [place] - [phone]   24 Hour Appointment Line [phone] or  [phone]) (toll free)   24 Hour Nurse Line [phone] (toll free)   Questions or concerns outside clinic hours 24 Hour Appointment Line, I will call the after-hours on-call line:   [facility] [phone] or [phone]) (toll-free)   Preferred Urgent Care [facility] [place], [phone]   Preferred Hospital [facility]  [phone]   Preferred Pharmacy [facility] [address] [place] [address] [address] [facility] [address]     Behavioral Health Crisis Line The National Suicide Prevention Lifeline at 1-812.450.3899 or 911     My Care Team Members  Patient Care Team       Relationship Specialty Notifications Start End    Kyle López MD PCP - General Internal Medicine  11/17/15     Phone: [phone] Fax: [phone]         [address] [place] [address]    Kyle López MD MD Internal Medicine  11/25/16     Comment:  referring to Vascular    Phone: [phone] Fax: [phone]         [address] [place] [address]    Bo Hampton MD Resident Radiology  11/29/16      Phone: [phone] Fax: [phone] [address] [facility] [address]    Behl, Melissa K, RN Clinic Care Coordinator Primary Care - CC  3/1/18     Phone: [phone] Fax: [phone]               My Medical and Care Information  Problem List   Patient Active Problem List   Diagnosis     Hyperlipidemia LDL goal <70     Essential hypertension     Advanced directives, counseling/discussion     COPD, mild (H)     Tinea pedis of left foot     Coronary artery disease involving native coronary artery of native heart without angina pectoris     Positive hepatitis C antibody test     Tobacco dependence syndrome     Paroxysmal atrial fibrillation (H)     Intermittent claudication (H)     Peripheral vascular disease (H)     S/P insertion of iliac artery stent      Current Medications and Allergies:  See printed Medication Report

## 2018-06-13 NOTE — PROGRESS NOTES
Clinic Care Coordination Contact  [facility]/Voicemail    Referral Source: [facility]  Clinical Data: Care Coordinator Outreach  Outreach attempted x 1.  Left message on voicemail with call back information and requested return call.  Plan: Care Coordinator will mail out care coordination introduction letter with care coordinator contact information and explanation of care coordination services. Care Coordinator will try to reach patient again in 5-10 business days.    Melissa Behl BSN, RN, PHN  [facility] Care Coordinator  [phone]

## 2018-06-18 ENCOUNTER — TRANSFERRED RECORDS (OUTPATIENT)
Dept: HEALTH INFORMATION MANAGEMENT | Facility: CLINIC | Age: 68
End: 2018-06-18

## 2018-06-19 ENCOUNTER — OFFICE VISIT (OUTPATIENT)
Dept: FAMILY MEDICINE | Facility: CLINIC | Age: 68
End: 2018-06-19
Payer: COMMERCIAL

## 2018-06-19 VITALS
HEIGHT: 70 IN | TEMPERATURE: 97.5 F | SYSTOLIC BLOOD PRESSURE: 101 MMHG | BODY MASS INDEX: 25.05 KG/M2 | HEART RATE: 91 BPM | RESPIRATION RATE: 12 BRPM | OXYGEN SATURATION: 99 % | DIASTOLIC BLOOD PRESSURE: 57 MMHG | WEIGHT: 175 LBS

## 2018-06-19 DIAGNOSIS — J44.9 COPD, MILD (H): ICD-10-CM

## 2018-06-19 DIAGNOSIS — Z98.890 H/O ENDARTERECTOMY: Primary | ICD-10-CM

## 2018-06-19 PROCEDURE — 99213 OFFICE O/P EST LOW 20 MIN: CPT | Performed by: INTERNAL MEDICINE

## 2018-06-19 ASSESSMENT — PAIN SCALES - GENERAL: PAINLEVEL: MILD PAIN (3)

## 2018-06-19 NOTE — PATIENT INSTRUCTIONS
At [facility], we strive to deliver an exceptional experience to you, every time we see you.  If you receive a survey in the mail, please send us back your thoughts. We really do value your feedback.    Based on your medical history, these are the current health maintenance/preventive care services that you are due for (some may have been done at this visit.)  Health Maintenance Due   Topic Date Due     COPD ACTION PLAN Q1 YR  1950     EYE EXAM Q1 YEAR  [birth year]16/1951     FOOT EXAM Q1 YEAR  07/29/2017         Suggested websites for health information:  Www.CallsFreeCalls.Banyan : Up to date and easily searchable information on multiple topics.  Www.Snappy shuttle.gov : medication info, interactive tutorials, watch real surgeries online  Www.familydoctor.org : good info from the Academy of Family Physicians  Www.cdc.gov : public health info, travel advisories, epidemics (H1N1)  Www.aap.org : children's health info, normal development, vaccinations  Www.health.[facility].[place].us : [place] dept of health, public health issues in [place], N1N1    Your care team:                            Family Medicine Internal Medicine   MD Kyle Mario MD Shantel Branch-Fleming, MD Katya Georgiev PA-C Nam Ho, MD Pediatrics   RONI Ricci, MARLENE George APRN CNP   MD Rohini Birmingham MD Deborah Mielke, MD Kim Thein, APRN CNP      Clinic hours: Monday - Thursday 7 am-7 pm; Fridays 7 am-5 pm.   Urgent care: Monday - Friday 11 am-9 pm; Saturday and Sunday 9 am-5 pm.  Pharmacy : Monday -Thursday 8 am-8 pm; Friday 8 am-6 pm; Saturday and Sunday 9 am-5 pm.     Clinic: [phone]   Pharmacy: [phone]

## 2018-06-19 NOTE — MR AVS SNAPSHOT
After Visit Summary   6/19/2018    Joseph Cardona    MRN: [id number]           Patient Information     Date Of Birth          1950        Visit Information        Provider Department      6/19/2018 12:00 PM Kyle López MD [facility]        Today's Diagnoses     S/P left femoral endarterectomy    -  1    Peripheral vascular disease (H)          Care Instructions    At [facility], we strive to deliver an exceptional experience to you, every time we see you.  If you receive a survey in the mail, please send us back your thoughts. We really do value your feedback.    Based on your medical history, these are the current health maintenance/preventive care services that you are due for (some may have been done at this visit.)  Health Maintenance Due   Topic Date Due     COPD ACTION PLAN Q1 YR  1950     EYE EXAM Q1 YEAR  02/16/1949     FOOT EXAM Q1 YEAR  07/29/2017         Suggested websites for health information:  Www.[employer] : Up to date and easily searchable information on multiple topics.  Www.medlineplus.gov : medication info, interactive tutorials, watch real surgeries online  Www.familydoctor.org : good info from the Academy of Family Physicians  Www.cdc.gov : public health info, travel advisories, epidemics (H1N1)  Www.aap.org : children's health info, normal development, vaccinations  Www.health.state.[place].us : [place] dept of health, public health issues in [place], N1N1    Your care team:                            Family Medicine Internal Medicine   MD Kyle Mario MD Shantel Branch-Fleming, MD Katya Georgiev PA-C Nam Ho, MD Pediatrics   RONI Ricci, MD Rohini Lanza CNP, MD Deborah Mielke, MD Kim Thein, APRN CNP      Clinic hours: Monday - Thursday 7 am-7 pm; Fridays 7 am-5 pm.   Urgent care: Monday - Friday 11 am-9 pm; Saturday and Sunday 9  "am-5 pm.  Pharmacy : Monday -Thursday 8 am-8 pm; Friday 8 am-6 pm; Saturday and Sunday 9 am-5 pm.     Clinic: [phone]   Pharmacy: [phone]            Follow-ups after your visit        Follow-up notes from your care team     Return in about 4 months (around 10/19/2018).      Who to contact     If you have questions or need follow up information about today's clinic visit or your schedule please contact [facility] directly at [phone].  Normal or non-critical lab and imaging results will be communicated to you by MyChart, letter or phone within 4 business days after the clinic has received the results. If you do not hear from us within 7 days, please contact the clinic through MyChart or phone. If you have a critical or abnormal lab result, we will notify you by phone as soon as possible.  Submit refill requests through [employer] or call your pharmacy and they will forward the refill request to us. Please allow 3 business days for your refill to be completed.          Additional Information About Your Visit        Care EveryWhere ID     This is your Care EveryWhere ID. This could be used by other organizations to access your [place] medical records  [id number]        Your Vitals Were     Pulse Temperature Respirations Height Pulse Oximetry BMI (Body Mass Index)    91 97.5  F (36.4  C) (Oral) 12 5' 10\" (1.778 m) 99% 25.11 kg/m2       Blood Pressure from Last 3 Encounters:   06/19/18 101/57   06/01/18 (!) 88/55   05/07/18 99/63    Weight from Last 3 Encounters:   06/19/18 175 lb (79.4 kg)   06/01/18 179 lb (81.2 kg)   05/07/18 181 lb (82.1 kg)              Today, you had the following     No orders found for display         Today's Medication Changes          These changes are accurate as of 6/19/18 12:34 PM.  If you have any questions, ask your nurse or doctor.               These medicines have changed or have updated prescriptions.        Dose/Directions    carvedilol 6.25 MG " tablet   Commonly known as:  COREG   This may have changed:    - how much to take  - additional instructions   Used for:  Essential hypertension with goal blood pressure less than 140/90, Coronary artery disease of native heart with stable angina pectoris, unspecified vessel or lesion type (H)        TAKE 1 TABLET BY MOUTH TWO TIMES DAILY WITH MEALS   Quantity:  180 tablet   Refills:  3       losartan 50 MG tablet   Commonly known as:  COZAAR   This may have changed:  additional instructions   Used for:  Essential hypertension with goal blood pressure less than 140/90        Dose:  50 mg   Take 1 tablet (50 mg) by mouth daily   Quantity:  90 tablet   Refills:  3                Primary Care Provider Office Phone # Fax #    Kyle López [phone]       [address]  [facility] [address]        Equal Access to Services     ARLENE NORRIS : Hadii aad ku hadasho Soomaali, waaxda luqadaha, qaybta kaalmada adeegyada, paul lutz . So [facility] [phone].    ATENCIÓN: Si habla español, tiene a murry disposición servicios gratuitos de asistencia lingüística. [facility] [phone].    We comply with applicable federal civil rights laws and [place] laws. We do not discriminate on the basis of race, color, national origin, age, disability, sex, sexual orientation, or gender identity.            Thank you!     Thank you for choosing [facility]  for your care. Our goal is always to provide you with excellent care. Hearing back from our patients is one way we can continue to improve our services. Please take a few minutes to complete the written survey that you may receive in the mail after your visit with us. Thank you!             Your Updated Medication List - Protect others around you: Learn how to safely use, store and throw away your medicines at www.disposemymeds.org.          This list is accurate as of 6/19/18 12:34 PM.  Always use your most  recent med list.                   Brand Name Dispense Instructions for use Diagnosis    aspirin 81 MG chewable tablet      81 mg daily        atorvastatin 40 MG tablet    LIPITOR    90 tablet    Take 1 tablet (40 mg) by mouth daily    Coronary artery disease of native heart with stable angina pectoris, unspecified vessel or lesion type (H)       carvedilol 6.25 MG tablet    COREG    180 tablet    TAKE 1 TABLET BY MOUTH TWO TIMES DAILY WITH MEALS    Essential hypertension with goal blood pressure less than 140/90, Coronary artery disease of native heart with stable angina pectoris, unspecified vessel or lesion type (H)       clopidogrel 75 MG tablet    PLAVIX    90 tablet    Take 1 tablet (75 mg) by mouth daily    Coronary artery disease of native heart with stable angina pectoris, unspecified vessel or lesion type (H)       furosemide 20 MG tablet    LASIX    90 tablet    Take 1 tablet (20 mg) by mouth daily    Essential hypertension with goal blood pressure less than 140/90       losartan 50 MG tablet    COZAAR    90 tablet    Take 1 tablet (50 mg) by mouth daily    Essential hypertension with goal blood pressure less than 140/90       nitroGLYcerin 0.4 MG sublingual tablet    NITROSTAT    25 tablet    PLACE 1 TABLET UNDER THE TONGUE EVERY 5 MINUTES AS NEEDED FOR CHEST PAIN.    Coronary artery disease of native heart with stable angina pectoris, unspecified vessel or lesion type (H)       PROZAC PO           ranitidine 150 MG tablet    ZANTAC    180 tablet    Take 1 tablet (150 mg) by mouth 2 times daily    Gastroesophageal reflux disease without esophagitis       umeclidinium 62.5 MCG/INH oral inhaler    INCRUSE ELLIPTA    1 Inhaler    Inhale 1 puff into the lungs daily    COPD, mild (H)       VENTOLIN  (90 Base) MCG/ACT Inhaler   Generic drug:  albuterol     18 Inhaler    INHALE 2 PUFFS INTO THE LUNGS EVERY 4 HOURS AS NEEDED FOR SHORTNESS OF BREATH / DYSPNEA OR WHEEZING    COPD, mild (H)       zolpidem 5  MG tablet    AMBIEN    30 tablet    Take 1 tablet (5 mg) by mouth nightly as needed    Persistent insomnia

## 2018-06-19 NOTE — PROGRESS NOTES
SUBJECTIVE:   Joseph Cardona is a 68 year old male who presents to clinic today for the following health issues:        Hospital Follow-up Visit:    Hospital/Nursing Home/ Rehab Facility: [facility]  Date of Admission: 6-5-18  Date of Discharge: 6-6-18  Reason(s) for Admission: PAD peripheral artery surgery left groin             Problems taking medications regularly:  None       Medication changes since discharge: None       Problems adhering to non-medication therapy:  None    Summary of hospitalization:  CareEverywhere information obtained and reviewed  Diagnostic Tests/Treatments reviewed.  Follow up needed: Yes.  Other Healthcare Providers Involved in Patient s Care:         None  Update since discharge: improved.     Post Discharge Medication Reconciliation: discharge medications reconciled, continue medications without change.  Plan of care communicated with patient     Coding guidelines for this visit:  Type of Medical   Decision Making Face-to-Face Visit       within 7 Days of discharge Face-to-Face Visit        within 14 days of discharge   Moderate Complexity 43429 [id number]   High Complexity 12812 45905                  Problem list and histories reviewed & adjusted, as indicated.  Additional history: as documented    Patient Active Problem List   Diagnosis     Hyperlipidemia LDL goal <70     Essential hypertension     Advanced directives, counseling/discussion     COPD, mild (H)     Coronary artery disease involving native coronary artery of native heart without angina pectoris     Positive hepatitis C antibody test     Tobacco dependence syndrome     Paroxysmal atrial fibrillation (H)     Intermittent claudication (H)     Peripheral vascular disease (H)     S/P insertion of iliac artery stent     S/P left femoral endarterectomy     Past Surgical History:   Procedure Laterality Date     CARDIAC SURGERY      Bypass double and quadruple     EYE SURGERY      cataract surgery     HEAD & NECK SURGERY       Caratid surgery     VASCULAR SURGERY         Social History   Substance Use Topics     Smoking status: Former Smoker     Smokeless tobacco: Never Used      Comment: quit 2 years ago     Alcohol use 0.0 oz/week     0 Standard drinks or equivalent per week     History reviewed. No pertinent family history.      Allergies   Allergen Reactions     Lisinopril Cough     Recent Labs   Lab Test  06/01/18   1157  04/25/18   1118  02/02/18   1130  06/26/17   1154   11/08/16   1139  07/14/16   1043   11/17/15   1212   A1C   --   5.7*   --   5.6   --    --   5.4   < >   --    LDL   --    --   66  58   --   78   --    --    --    HDL   --    --   39*  36*   --   36*   --    --    --    TRIG   --    --   72  77   --   50   --    --    --    ALT  14   --   17  15   --   17   --    < >   --    CR  1.04   --   1.13  1.03   < >  0.97   --    < >   --    GFRESTIMATED  71   --   64  72   < >  77   --    < >   --    GFRESTBLACK  85   --   78  87   < >  >90   GFR Calc     --    < >   --    POTASSIUM  4.1   --   4.5  4.6   --   4.4   --    < >   --    TSH   --   2.60   --    --    --    --    --    --   4.71*    < > = values in this interval not displayed.      BP Readings from Last 3 Encounters:   06/19/18 101/57   06/01/18 (!) 88/55   05/07/18 99/63    Wt Readings from Last 3 Encounters:   06/19/18 175 lb (79.4 kg)   06/01/18 179 lb (81.2 kg)   05/07/18 181 lb (82.1 kg)                      ROS:  CONSTITUTIONAL: NEGATIVE for fever, chills, change in weight  INTEGUMENTARY/SKIN: NEGATIVE for worrisome rashes, moles or lesions  EYES: NEGATIVE for vision changes or irritation  ENT/MOUTH: NEGATIVE for ear, mouth and throat problems  RESP:POSITIVE for Hx COPD and NEGATIVE for pleurisy and wheezing  CV: NEGATIVE for chest pain, palpitations or peripheral edema  GI: NEGATIVE for nausea, abdominal pain, heartburn, or change in bowel habits  : NEGATIVE for frequency, dysuria, or hematuria  MUSCULOSKELETAL: NEGATIVE for  "significant arthralgias or myalgia  NEURO: NEGATIVE for weakness, dizziness or paresthesias  ENDOCRINE: NEGATIVE for temperature intolerance, skin/hair changes  HEME: NEGATIVE for bleeding problems  PSYCHIATRIC: NEGATIVE for changes in mood or affect    OBJECTIVE:     /57 (BP Location: Left arm, Patient Position: Chair, Cuff Size: Adult Regular)  Pulse 91  Temp 97.5  F (36.4  C) (Oral)  Resp 12  Ht 5' 10\" (1.778 m)  Wt 175 lb (79.4 kg)  SpO2 99%  BMI 25.11 kg/m2  Body mass index is 25.11 kg/(m^2).  GENERAL: healthy, alert and no distress  EYES: Eyes grossly normal to inspection and conjunctivae and sclerae normal  HENT: normal cephalic/atraumatic and oral mucous membranes moist  NECK: no adenopathy  CV: regular rate and rhythm, normal S1 S2, no S3 or S4, no murmur, click or rub, no peripheral edema and peripheral pulses strong  MS: no gross musculoskeletal defects noted, no edema  SKIN: no suspicious lesions or rashes  NEURO: Normal strength and tone, mentation intact and speech normal  PSYCH: mentation appears normal, affect normal/bright  VASCULAR; Palpable pulses at dorsalis pedis artery, grade 2+.    Diagnostic Test Results:  none     ASSESSMENT/PLAN:     (Z98.890) S/P left femoral endarterectomy  (primary encounter diagnosis)  Comment: Successful procedure without complications.  Plan: Continue Plavix and aspirin as directed by his cardiologist.    (J44.9) COPD, mild (H)  Comment: Stable condition with use of only long-acting anticholinergic inhaler.  Plan: COPD ACTION PLAN            Follow-up visit if condition worsens.    Kyle López MD  [facility]  "

## 2018-06-20 ASSESSMENT — ACTIVITIES OF DAILY LIVING (ADL): DEPENDENT_IADLS:: INDEPENDENT

## 2018-06-20 NOTE — PROGRESS NOTES
Clinic Care Coordination Contact  [facility]/Voicemail    Referral Source: [facility]  Clinical Data: Care Coordinator Outreach  Outreach attempted x 2.  Left message on voicemail with call back information and requested return call.  Plan: Care Coordinator mailed out care coordination introduction letter on 6/13/18. Care Coordinator will do no further outreaches at this time.    Melissa Behl BSN, RN, PHN  [facility] Care Coordinator  [phone]

## 2018-07-06 ENCOUNTER — TRANSFERRED RECORDS (OUTPATIENT)
Dept: HEALTH INFORMATION MANAGEMENT | Facility: CLINIC | Age: 68
End: 2018-07-06

## 2018-08-03 ENCOUNTER — TRANSFERRED RECORDS (OUTPATIENT)
Dept: HEALTH INFORMATION MANAGEMENT | Facility: CLINIC | Age: 68
End: 2018-08-03

## 2018-09-01 DIAGNOSIS — G47.00 PERSISTENT INSOMNIA: ICD-10-CM

## 2018-09-01 NOTE — TELEPHONE ENCOUNTER
Requested Prescriptions   Pending Prescriptions Disp Refills     zolpidem (AMBIEN) 5 MG tablet [Pharmacy Med Name: ZOLPIDEM TARTRATE 5 MG TABLET]  Last Written Prescription Date:  2/2/18  Last Fill Quantity: 30,  # refills: 5   Last office visit: 6/19/2018 with prescribing provider:  Maribel   Future Office Visit:     30 tablet      Sig: TAKE 1 TABLET BY MOUTH ONCE NIGHTLY AS NEEDED    There is no refill protocol information for this order

## 2018-09-06 RX ORDER — ZOLPIDEM TARTRATE 5 MG/1
TABLET ORAL
Qty: 30 TABLET | Refills: 5 | Status: SHIPPED | OUTPATIENT
Start: 2018-09-06 | End: 2019-07-29

## 2018-10-15 ENCOUNTER — TRANSFERRED RECORDS (OUTPATIENT)
Dept: HEALTH INFORMATION MANAGEMENT | Facility: CLINIC | Age: 68
End: 2018-10-15

## 2018-11-12 ENCOUNTER — OFFICE VISIT (OUTPATIENT)
Dept: PHARMACY | Facility: CLINIC | Age: 68
End: 2018-11-12
Payer: COMMERCIAL

## 2018-11-12 VITALS — SYSTOLIC BLOOD PRESSURE: 142 MMHG | DIASTOLIC BLOOD PRESSURE: 67 MMHG | HEART RATE: 67 BPM

## 2018-11-12 DIAGNOSIS — J44.9 COPD, MILD (H): ICD-10-CM

## 2018-11-12 DIAGNOSIS — I10 ESSENTIAL HYPERTENSION WITH GOAL BLOOD PRESSURE LESS THAN 140/90: ICD-10-CM

## 2018-11-12 DIAGNOSIS — G47.00 INSOMNIA, UNSPECIFIED TYPE: Primary | ICD-10-CM

## 2018-11-12 DIAGNOSIS — I25.118 CORONARY ARTERY DISEASE OF NATIVE HEART WITH STABLE ANGINA PECTORIS, UNSPECIFIED VESSEL OR LESION TYPE (H): ICD-10-CM

## 2018-11-12 DIAGNOSIS — K21.9 GASTROESOPHAGEAL REFLUX DISEASE WITHOUT ESOPHAGITIS: ICD-10-CM

## 2018-11-12 DIAGNOSIS — Z71.85 VACCINE COUNSELING: ICD-10-CM

## 2018-11-12 PROCEDURE — 99606 MTMS BY PHARM EST 15 MIN: CPT | Performed by: PHARMACIST

## 2018-11-12 PROCEDURE — 99607 MTMS BY PHARM ADDL 15 MIN: CPT | Performed by: PHARMACIST

## 2018-11-12 RX ORDER — LOSARTAN POTASSIUM 25 MG/1
25 TABLET ORAL DAILY
COMMUNITY
Start: 2018-11-12 | End: 2020-01-01

## 2018-11-12 RX ORDER — CARVEDILOL 6.25 MG/1
TABLET ORAL
Qty: 180 TABLET | Refills: 3 | COMMUNITY
Start: 2018-11-12 | End: 2019-05-31

## 2018-11-12 NOTE — PATIENT INSTRUCTIONS
Recommendations from today's MTM visit:                                                        1. Recommend the shingles vaccine - Shingrix.      2. Could try melatonin 3-5mg at bedtime for sleep. Take up to 10mg if needed.    3. Decrease ranitidine to 150mg once a day.    Next MTM visit: 6 months    To schedule another MTM appointment, please call the clinic directly or you may call the MTM scheduling line at [phone] or toll-free at [phone].     My Clinical Pharmacist's contact information:                                                      It was a pleasure talking with you today!  Please feel free to contact me with any questions or concerns you have.      Natalya Stephenson, PharmD, [facility]   [phone]    You may receive a survey about the MTM services you received.  I would appreciate your feedback to help me serve you better in the future. Please fill it out and return it when you can. Your comments will be anonymous.

## 2018-11-12 NOTE — MR AVS SNAPSHOT
After Visit Summary   11/12/2018    Joseph Cardona    MRN: [id number]           Patient Information     Date Of Birth          1950        Visit Information        Provider Department      11/12/2018 1:30 PM Natalya Stephenson, [facility] MTM        Today's Diagnoses     Essential hypertension with goal blood pressure less than 140/90        Coronary artery disease of native heart with stable angina pectoris, unspecified vessel or lesion type (H)        Gastroesophageal reflux disease without esophagitis          Care Instructions    Recommendations from today's MTM visit:                                                        1. Recommend the shingles vaccine - Shingrix.      2. Could try melatonin 3-5mg at bedtime for sleep. Take up to 10mg if needed.    3. Decrease ranitidine to 150mg once a day.    Next MTM visit: 6 months    To schedule another MTM appointment, please call the clinic directly or you may call the MTM scheduling line at [phone] or toll-free at [phone].     My Clinical Pharmacist's contact information:                                                      It was a pleasure talking with you today!  Please feel free to contact me with any questions or concerns you have.      Natalya Stephenson, PharmD, [facility]   [phone]    You may receive a survey about the MTM services you received.  I would appreciate your feedback to help me serve you better in the future. Please fill it out and return it when you can. Your comments will be anonymous.                  Follow-ups after your visit        Who to contact     If you have questions or need follow up information about today's clinic visit or your schedule please contact [facility] MTM directly at [phone].  Normal or non-critical lab and imaging results will be communicated to you by MyChart, letter or phone within 4 business days after the clinic has received the  "results. If you do not hear from us within 7 days, please contact the clinic through [employer] or phone. If you have a critical or abnormal lab result, we will notify you by phone as soon as possible.  Submit refill requests through [employer] or call your pharmacy and they will forward the refill request to us. Please allow 3 business days for your refill to be completed.          Additional Information About Your Visit        Rally [employer] Information     [employer] lets you send messages to your doctor, view your test results, renew your prescriptions, schedule appointments and more. To sign up, go to [email or web address] . Click on \"Log in\" on the left side of the screen, which will take you to the Welcome page. Then click on \"Sign up Now\" on the right side of the page.     You will be asked to enter the access code listed below, as well as some personal information. Please follow the directions to create your username and password.     Your access code is: [email or web address]  Expires: 2/10/2019  2:12 PM     Your access code will  in 90 days. If you need help or a new code, please call your [place] clinic or [phone].        Care EveryWhere ID     This is your Care EveryWhere ID. This could be used by other organizations to access your [place] medical records  [id number]        Your Vitals Were     Pulse                   67            Blood Pressure from Last 3 Encounters:   18 142/67   18 101/57   18 (!) 88/55    Weight from Last 3 Encounters:   18 175 lb (79.4 kg)   18 179 lb (81.2 kg)   18 181 lb (82.1 kg)              Today, you had the following     No orders found for display         Today's Medication Changes          These changes are accurate as of 18  2:12 PM.  If you have any questions, ask your nurse or doctor.               These medicines have changed or have updated prescriptions.        Dose/Directions    carvedilol 6.25 MG tablet   Commonly known as:  " COREG   This may have changed:    - how much to take  - additional instructions   Used for:  Essential hypertension with goal blood pressure less than 140/90, Coronary artery disease of native heart with stable angina pectoris, unspecified vessel or lesion type (H)        TAKE 1 TABLET BY MOUTH TWO TIMES DAILY WITH MEALS   Quantity:  180 tablet   Refills:  3       losartan 25 MG tablet   Commonly known as:  COZAAR   This may have changed:    - medication strength  - how much to take   Used for:  Essential hypertension with goal blood pressure less than 140/90        Dose:  25 mg   Take 1 tablet (25 mg) by mouth daily   Refills:  0       ranitidine 150 MG tablet   Commonly known as:  ZANTAC   This may have changed:  when to take this   Used for:  Gastroesophageal reflux disease without esophagitis        Dose:  150 mg   Take 1 tablet (150 mg) by mouth every evening   Quantity:  90 tablet   Refills:  3            Where to get your medicines      These medications were sent to [place] [id number] [facility] [id number] [place] [address]  [address] [facility] [address]     Phone:  [phone]     ranitidine 150 MG tablet                Primary Care Provider Office Phone # Fax #    Kyle López [phone]       [address] [place]  [facility] [address]        Equal Access to Services     ARLENE NORRIS : Hadii myrtle grayson hadasho Soomaali, waaxda luqadaha, qaybta kaalmada adeegyada, paul booth. So [facility] [phone].    ATENCIÓN: Si habla español, tiene a murry disposición servicios gratuitos de asistencia lingüística. Caitlin al [phone].    We comply with applicable federal civil rights laws and [place] laws. We do not discriminate on the basis of race, color, national origin, age, disability, sex, sexual orientation, or gender identity.            Thank you!     Thank you for choosing [facility]  for your care. Our goal is always to  provide you with excellent care. Hearing back from our patients is one way we can continue to improve our services. Please take a few minutes to complete the written survey that you may receive in the mail after your visit with us. Thank you!             Your Updated Medication List - Protect others around you: Learn how to safely use, store and throw away your medicines at www.disposemymeds.org.          This list is accurate as of 11/12/18  2:12 PM.  Always use your most recent med list.                   Brand Name Dispense Instructions for use Diagnosis    aspirin 81 MG chewable tablet      81 mg daily        atorvastatin 40 MG tablet    LIPITOR    90 tablet    Take 1 tablet (40 mg) by mouth daily    Coronary artery disease of native heart with stable angina pectoris, unspecified vessel or lesion type (H)       carvedilol 6.25 MG tablet    COREG    180 tablet    TAKE 1 TABLET BY MOUTH TWO TIMES DAILY WITH MEALS    Essential hypertension with goal blood pressure less than 140/90, Coronary artery disease of native heart with stable angina pectoris, unspecified vessel or lesion type (H)       clopidogrel 75 MG tablet    PLAVIX    90 tablet    Take 1 tablet (75 mg) by mouth daily    Coronary artery disease of native heart with stable angina pectoris, unspecified vessel or lesion type (H)       furosemide 20 MG tablet    LASIX    90 tablet    Take 1 tablet (20 mg) by mouth daily    Essential hypertension with goal blood pressure less than 140/90       losartan 25 MG tablet    COZAAR     Take 1 tablet (25 mg) by mouth daily    Essential hypertension with goal blood pressure less than 140/90       nitroGLYcerin 0.4 MG sublingual tablet    NITROSTAT    25 tablet    PLACE 1 TABLET UNDER THE TONGUE EVERY 5 MINUTES AS NEEDED FOR CHEST PAIN.    Coronary artery disease of native heart with stable angina pectoris, unspecified vessel or lesion type (H)       ranitidine 150 MG tablet    ZANTAC    90 tablet    Take 1 tablet (150  mg) by mouth every evening    Gastroesophageal reflux disease without esophagitis       umeclidinium 62.5 MCG/INH inhaler    INCRUSE ELLIPTA    1 Inhaler    Inhale 1 puff into the lungs daily    COPD, mild (H)       VENTOLIN  (90 Base) MCG/ACT inhaler   Generic drug:  albuterol     18 Inhaler    INHALE 2 PUFFS INTO THE LUNGS EVERY 4 HOURS AS NEEDED FOR SHORTNESS OF BREATH / DYSPNEA OR WHEEZING    COPD, mild (H)       zolpidem 5 MG tablet    AMBIEN    30 tablet    TAKE 1 TABLET BY MOUTH ONCE NIGHTLY AS NEEDED    Persistent insomnia

## 2018-11-12 NOTE — PROGRESS NOTES
SUBJECTIVE/OBJECTIVE:                Joseph Cardona is a 68 year old male coming in for a follow-up visit for Medication Therapy Management.  He was referred to me from [place].     Chief Complaint: Follow up from our visit on 2/19/18.  None, med review.  Tobacco: History of tobacco dependence - quit August 2015   Alcohol: not currently using    Medication Adherence: No issues. Adherence is excellent; denies any missed doses. He sets up own med boxes and takes meds 2 times per day.     Hx STEMI/HTN: Currently taking losartan 25mg daily, carvedilol 6.25mg BID, furosemide 20mg daily, ASA 81mg daily and Plavix 75mg daily. Also has nitrostat 0.4mg SL on-hand for chest pain, though he has never used it. Denies abnormal bruising/bleeding or other side effects. [place]. Denies lightheadedness or orthostatic hypotension. Continues to complain of significant urination with furosemide. Denies any hx edema. Does not check BP outside of clinic. Reports BP at rehab 120/80.      Hyperlipidemia/PVD: Current therapy includes atorvastatin 40mg once daily.  Pt reports no significant myalgias or other side effects. Has been in rehab for PAD, completing x 12 weeks. Has been going well.      COPD: Current medications: Incruse once daily and Albuterol used rarely, up to twice a week and works well.   Pt is not experiencing side effects.   Cost is not a problem for inhalers.  Pt reports the following symptoms: none.  Pt does not have an COPD Action Plan on file.   Has spirometry been completed: Yes      Insomnia: Current medications include: zolpidem 5mg 1-2 times a week. Pt reports trouble staying asleep, even when he takes zolpidem. No reported SE.     GERD: Current medications include: ranitidine 150mg BID. No symptoms; pt feels his regimen is effective. Asking if he can discontinue. No history of ulcer or GI bleed that he is aware of.    Immunization: reviewed history, has not  had Shingrix    Today's Vitals: /67  Pulse 67      ASSESSMENT:              Current medications were reviewed today as discussed above.  Medicare Part D topics discussed:OTC products    Medication Adherence: excellent, no issues identified    Hx STEMI/HTN: stable. Pt will follow-up with cardiology to ask about discontinuing furosemide due to side effects. BP has been well controlled outside of today's reading; will continue to monitor.     Hyperlipidemia/PVD: improved    COPD: stable    insomnia: needs improvement. Encouraged pt to continue minimizing zolpidem. Could trial melatonin otc    GERD: stable. Will reduce ranitidine to once daily dosing.     Immunization: recommend Shingrix     PLAN:                  Decrease ranitidine to 150mg daily  Start melatonin 3-5mg HS  Shingrix at dispensing pharmacy    I spent 30 minutes with this patient today. All changes were made via collaborative practice agreement with Kyle López. A copy of the visit note was provided to the patient's primary care provider.     Will follow up in 6 months.    The patient was given a summary of these recommendations as an after visit summary.    Natalya Stephenson, PharmD, BCACP

## 2018-12-31 ENCOUNTER — TRANSFERRED RECORDS (OUTPATIENT)
Dept: HEALTH INFORMATION MANAGEMENT | Facility: CLINIC | Age: 68
End: 2018-12-31

## 2019-01-08 ENCOUNTER — TELEPHONE (OUTPATIENT)
Dept: FAMILY MEDICINE | Facility: CLINIC | Age: 69
End: 2019-01-08

## 2019-01-08 ENCOUNTER — TELEPHONE (OUTPATIENT)
Dept: INTERNAL MEDICINE | Facility: CLINIC | Age: 69
End: 2019-01-08

## 2019-01-08 NOTE — LETTER
[address] [address]  [phone]  Dept: [phone]  January 8, 2019    Joseph Cardona  [address] [facility]  [place] [address]    Dear Joseph,    I care about your health and have reviewed your health plan. I have reviewed your medical conditions, medication list, and lab results and am making recommendations based on this review, to better manage your health.    You are in particular need of attention regarding:  -High Blood Pressure    I am recommending that you:  -schedule a FOLLOWUP OFFICE APPOINTMENT with me.     Here is a list of Health Maintenance topics that are due now or due soon:  Health Maintenance Due   Topic Date Due     Eye Exam - yearly  02/16/1951     Zoster (Chicken Pox) Vaccine (1 of 2) 02/16/1998     Diabetic Foot Exam - yearly  07/29/2017     A1C (Diabetes) Lab - every 6 months  10/25/2018     Cholesterol Lab - yearly  02/02/2019     Microalbumin Lab - yearly  02/02/2019       Please call us at [phone] (or use [employer]) to address the above recommendations.     Thank you for trusting [facility] and we appreciate the opportunity to serve you.  We look forward to supporting your healthcare needs in the future.    Healthy Regards,      [address] [address]  [phone]  Dept: [phone]

## 2019-01-08 NOTE — TELEPHONE ENCOUNTER
Panel Management Review      Patient has the following on her problem list: Hypertension    ASA: Passed    Last A1C  Lab Results   Component Value Date    A1C 5.7 04/25/2018    A1C 5.6 06/26/2017    A1C 5.4 07/14/2016    A1C 5.6 02/09/2016     A1C tested: Passed    Last LDL:    Lab Results   Component Value Date    CHOL 119 02/02/2018     Lab Results   Component Value Date    HDL 39 02/02/2018     Lab Results   Component Value Date    LDL 66 02/02/2018     Lab Results   Component Value Date    TRIG 72 02/02/2018     Lab Results   Component Value Date    CHOLHDLRATIO 3.6 01/12/2010     Lab Results   Component Value Date    NHDL 80 02/02/2018       Is the patient on a Statin? YES             Is the patient on Aspirin? YES    Medications     HMG CoA Reductase Inhibitors    atorvastatin (LIPITOR) 40 MG tablet    Salicylates    aspirin 81 MG chewable tablet        Last three blood pressure readings:  BP Readings from Last 3 Encounters:   11/12/18 142/67   06/19/18 101/57   06/01/18 (!) 88/55       Date of last diabetes office visit: 06/19/2018     Tobacco History:     History   Smoking Status     Former Smoker   Smokeless Tobacco     Never Used     Comment: quit 2 years ago        Patient is due/failing the following:   BP CHECK and FOLLOW UP    Action needed:   Patient needs office visit for follow up for Hypertension.    Type of outreach:    Phone, left message for patient to call back.     Questions for provider review:    None                                                                                                                               Jackie Renae, [facility]      Chart routed to Care Team.

## 2019-01-09 ENCOUNTER — TELEPHONE (OUTPATIENT)
Dept: INTERNAL MEDICINE | Facility: CLINIC | Age: 69
End: 2019-01-09

## 2019-01-09 ENCOUNTER — OFFICE VISIT (OUTPATIENT)
Dept: FAMILY MEDICINE | Facility: CLINIC | Age: 69
End: 2019-01-09
Payer: MEDICARE

## 2019-01-09 VITALS
WEIGHT: 185 LBS | OXYGEN SATURATION: 95 % | HEIGHT: 70 IN | BODY MASS INDEX: 26.48 KG/M2 | SYSTOLIC BLOOD PRESSURE: 140 MMHG | DIASTOLIC BLOOD PRESSURE: 70 MMHG | TEMPERATURE: 97.5 F | HEART RATE: 80 BPM | RESPIRATION RATE: 18 BRPM

## 2019-01-09 DIAGNOSIS — R73.03 PREDIABETES: ICD-10-CM

## 2019-01-09 DIAGNOSIS — E78.5 HYPERLIPIDEMIA LDL GOAL <70: ICD-10-CM

## 2019-01-09 DIAGNOSIS — I10 ESSENTIAL HYPERTENSION: ICD-10-CM

## 2019-01-09 DIAGNOSIS — Z01.818 PREOP GENERAL PHYSICAL EXAM: Primary | ICD-10-CM

## 2019-01-09 LAB
ALBUMIN SERPL-MCNC: 3.5 G/DL (ref 3.4–5)
ALP SERPL-CCNC: 94 U/L (ref 40–150)
ALT SERPL W P-5'-P-CCNC: 12 U/L (ref 0–70)
ANION GAP SERPL CALCULATED.3IONS-SCNC: 6 MMOL/L (ref 3–14)
AST SERPL W P-5'-P-CCNC: 12 U/L (ref 0–45)
BASOPHILS # BLD AUTO: 0 10E9/L (ref 0–0.2)
BASOPHILS NFR BLD AUTO: 0.5 %
BILIRUB SERPL-MCNC: 1.2 MG/DL (ref 0.2–1.3)
BUN SERPL-MCNC: 15 MG/DL (ref 7–30)
CALCIUM SERPL-MCNC: 8.4 MG/DL (ref 8.5–10.1)
CHLORIDE SERPL-SCNC: 104 MMOL/L (ref 94–109)
CHOLEST SERPL-MCNC: 110 MG/DL
CO2 SERPL-SCNC: 26 MMOL/L (ref 20–32)
CREAT SERPL-MCNC: 1.04 MG/DL (ref 0.66–1.25)
CREAT UR-MCNC: 91 MG/DL
DIFFERENTIAL METHOD BLD: NORMAL
EOSINOPHIL # BLD AUTO: 0.3 10E9/L (ref 0–0.7)
EOSINOPHIL NFR BLD AUTO: 3.9 %
ERYTHROCYTE [DISTWIDTH] IN BLOOD BY AUTOMATED COUNT: 12.8 % (ref 10–15)
GFR SERPL CREATININE-BSD FRML MDRD: 72 ML/MIN/{1.73_M2}
GLUCOSE SERPL-MCNC: 118 MG/DL (ref 70–99)
HBA1C MFR BLD: 5.8 % (ref 0–5.6)
HCT VFR BLD AUTO: 43.4 % (ref 40–53)
HDLC SERPL-MCNC: 33 MG/DL
HGB BLD-MCNC: 14.5 G/DL (ref 13.3–17.7)
INR PPP: 1.17 (ref 0.86–1.14)
LDLC SERPL CALC-MCNC: 64 MG/DL
LYMPHOCYTES # BLD AUTO: 0.9 10E9/L (ref 0.8–5.3)
LYMPHOCYTES NFR BLD AUTO: 10.8 %
MCH RBC QN AUTO: 30.6 PG (ref 26.5–33)
MCHC RBC AUTO-ENTMCNC: 33.4 G/DL (ref 31.5–36.5)
MCV RBC AUTO: 92 FL (ref 78–100)
MICROALBUMIN UR-MCNC: 7 MG/L
MICROALBUMIN/CREAT UR: 8.05 MG/G CR (ref 0–17)
MONOCYTES # BLD AUTO: 1 10E9/L (ref 0–1.3)
MONOCYTES NFR BLD AUTO: 12.5 %
NEUTROPHILS # BLD AUTO: 5.7 10E9/L (ref 1.6–8.3)
NEUTROPHILS NFR BLD AUTO: 72.3 %
NONHDLC SERPL-MCNC: 77 MG/DL
PLATELET # BLD AUTO: 240 10E9/L (ref 150–450)
POTASSIUM SERPL-SCNC: 4.5 MMOL/L (ref 3.4–5.3)
PROT SERPL-MCNC: 7.1 G/DL (ref 6.8–8.8)
RBC # BLD AUTO: 4.74 10E12/L (ref 4.4–5.9)
SODIUM SERPL-SCNC: 136 MMOL/L (ref 133–144)
TRIGL SERPL-MCNC: 65 MG/DL
WBC # BLD AUTO: 7.9 10E9/L (ref 4–11)

## 2019-01-09 PROCEDURE — 83036 HEMOGLOBIN GLYCOSYLATED A1C: CPT | Performed by: INTERNAL MEDICINE

## 2019-01-09 PROCEDURE — 99215 OFFICE O/P EST HI 40 MIN: CPT | Performed by: INTERNAL MEDICINE

## 2019-01-09 PROCEDURE — 93000 ELECTROCARDIOGRAM COMPLETE: CPT | Performed by: INTERNAL MEDICINE

## 2019-01-09 PROCEDURE — 80053 COMPREHEN METABOLIC PANEL: CPT | Performed by: INTERNAL MEDICINE

## 2019-01-09 PROCEDURE — 80061 LIPID PANEL: CPT | Performed by: INTERNAL MEDICINE

## 2019-01-09 PROCEDURE — 85025 COMPLETE CBC W/AUTO DIFF WBC: CPT | Performed by: INTERNAL MEDICINE

## 2019-01-09 PROCEDURE — 36415 COLL VENOUS BLD VENIPUNCTURE: CPT | Performed by: INTERNAL MEDICINE

## 2019-01-09 PROCEDURE — 85610 PROTHROMBIN TIME: CPT | Performed by: INTERNAL MEDICINE

## 2019-01-09 PROCEDURE — 82043 UR ALBUMIN QUANTITATIVE: CPT | Performed by: INTERNAL MEDICINE

## 2019-01-09 ASSESSMENT — PAIN SCALES - GENERAL: PAINLEVEL: NO PAIN (0)

## 2019-01-09 ASSESSMENT — MIFFLIN-ST. JEOR: SCORE: 1605.4

## 2019-01-09 NOTE — TELEPHONE ENCOUNTER
Patient was seen today for Pre-op.  Closing encounter.    Lionel Trevino MA on 1/9/2019 at 9:03 AM

## 2019-01-09 NOTE — PROGRESS NOTES
[address] [address]  [phone]  Dept: [phone]    PRE-OP EVALUATION:  Today's date: 2019    Joseph Cardona (: 1950) presents for pre-operative evaluation assessment as requested by Dr. Corona.  He requires evaluation and anesthesia risk assessment prior to undergoing surgery/procedure for right femoral endarterectomy.    Proposed Surgery/ Procedure: Right Femoral Endarterectomy   Date of Surgery/ Procedure: 19  Time of Surgery/ Procedure: 7:00 AM  Surgeon: Christo Corona MD ([facility] [place])  Hospital/Surgical Facility: [facility]   Fax number for surgical facility: [phone]  Primary Physician: Kyle López  Type of Anesthesia Anticipated: to be determined    Patient has a Health Care Directive or Living Will:  No     1. YES - DO YOU HAVE A HISTORY OF HEART ATTACK, STROKE, STENT, BYPASS OR SURGERY ON AN ARTERY IN THE HEAD, NECK, HEART OR LEG?   2. NO - Do you ever have any pain or discomfort in your chest?  3. NO - Do you have a history of  Heart Failure?  4. YES - ARE YOUR TROUBLED BY SHORTNESS OF BREATH WHEN WALKING ON THE LEVEL, UP A SLIGHT HILL OR AT NIGHT?   5. NO - Do you currently have a cold, bronchitis or other respiratory infection?  6. NO - Do you have a cough, shortness of breath or wheezing?  7. YES - DO YOU SOMETIMES GET PAINS IN THE CALVES OF YOUR LEGS WHEN YOU WALK?   8. YES - DO YOU OR ANYONE IN YOUR FAMILY HAVE PREVIOUS HISTORY OF BLOOD CLOTS?   9. NO - Do you or does anyone in your family have a serious bleeding problem such as prolonged bleeding following surgeries or cuts?  10. NO - Have you ever had problems with anemia or been told to take iron pills?  11. NO - Have you had any abnormal blood loss such as black, tarry or bloody stools, or abnormal vaginal bleeding?  12. NO - Have you ever had a blood transfusion?  13. NO - Have you or any of your relatives  ever had problems with anesthesia?  14. NO - Do you have sleep apnea, excessive snoring or daytime drowsiness?  15. NO - Do you have any prosthetic heart valves?  16. NO - Do you have prosthetic joints?  17. NO - Is there any chance that you may be pregnant?      HPI:     HPI related to upcoming procedure:          This is a 68 year old male who comes in today for a preoperative evaluation. Mr. Cardona has peripheral vascular disease, S/P bilateral common iliac artery and external iliac artery stent placement last January 2017. He was seen by his vascular surgeon, Dr. Corona, last October 15, 2018 and the patient still complains of claudication of his right lower extremity. The patient was started an intermittent walking program via cardiac rehab at [facility] last 10/29/18. , yet his symptoms persist. Bilateral arterial ultrasound of both lower extremity with PIOTR and CT coronary angiography/pelvis with lower extremity runoff shows evidence of show evidence of high-grade near occlusive stenosis if the proximal right common femoral artery. The patient is scheduled to undergo a right common femoral endarterectomy on January 18, 2019.      CAD - Patient has a longstanding history of CAD. NSTEMI last April 25, 2018 (admitted at [facility]), S/P drug-eluting stent placement of the mid body saphenous grafts and distal anastomosis of thr saphenous vein graft to the LÁZARO. Patient denies recent chest pain, exercise induced dyspnea or PND.                                                                                                                                                .  COPD - Patient has been doing well overall noting NO SYMPTOMS and continues on long-acting antimuscarinic agent (Incruse Ellipta) without any side effects.                                                                                    HYPERLIPIDEMIA - Patient has a long history of significant Hyperlipidemia requiring  medication for treatment with recent good control. Patient reports no problems or side effects with Atorvastatin.                                                                                                                                                      .  HYPERTENSION - Patient currently denies any symptoms referable to elevated blood pressure. Blood pressure readings have been in normal range. Current medication regimen is as listed below. Patient denies any side effects of medication.                                                                                                                                                                                          MEDICAL HISTORY:     Patient Active Problem List    Diagnosis Date Noted     S/P left femoral endarterectomy 06/05/2018     Priority: Medium     Peripheral vascular disease (H) 06/26/2017     Priority: Medium     S/P insertion of iliac artery stent 06/26/2017     Priority: Medium     Intermittent claudication (H) 11/09/2016     Priority: Medium     Positive hepatitis C antibody test 06/10/2016     Priority: Medium     Coronary artery disease involving native coronary artery of native heart without angina pectoris 06/09/2016     Priority: Medium     COPD, mild (H) 02/09/2016     Priority: Medium     Advanced directives, counseling/discussion 11/17/2015     Priority: Medium     Patient dont have HCD, declined to discuss at this time  Funmi King MA         Hyperlipidemia LDL goal <70 08/14/2015     Priority: Medium     Problem list name updated by automated process. Provider to review       Essential hypertension 08/14/2015     Priority: Medium     Tobacco dependence syndrome 08/14/2015     Priority: Medium     Paroxysmal atrial fibrillation (H) 08/14/2015     Priority: Medium      Past Medical History:   Diagnosis Date     Heart disease      Hypertension      Past Surgical History:   Procedure Laterality Date     CARDIAC SURGERY      Bypass  double and quadruple     EYE SURGERY      cataract surgery     HEAD & NECK SURGERY      Caratid surgery     VASCULAR SURGERY       Current Outpatient Medications   Medication Sig Dispense Refill     aspirin 81 MG chewable tablet 81 mg daily       atorvastatin (LIPITOR) 40 MG tablet Take 1 tablet (40 mg) by mouth daily 90 tablet 3     carvedilol (COREG) 6.25 MG tablet TAKE 1 TABLET BY MOUTH TWO TIMES DAILY WITH MEALS 180 tablet 3     clopidogrel (PLAVIX) 75 MG tablet Take 1 tablet (75 mg) by mouth daily 90 tablet 3     furosemide (LASIX) 20 MG tablet Take 1 tablet (20 mg) by mouth daily 90 tablet 3     losartan (COZAAR) 25 MG tablet Take 1 tablet (25 mg) by mouth daily       nitroGLYcerin (NITROSTAT) 0.4 MG sublingual tablet PLACE 1 TABLET UNDER THE TONGUE EVERY 5 MINUTES AS NEEDED FOR CHEST PAIN. 25 tablet 1     ranitidine (ZANTAC) 150 MG tablet Take 1 tablet (150 mg) by mouth every evening 90 tablet 3     umeclidinium (INCRUSE ELLIPTA) 62.5 MCG/INH oral inhaler Inhale 1 puff into the lungs daily 1 Inhaler 11     VENTOLIN  (90 Base) MCG/ACT Inhaler INHALE 2 PUFFS INTO THE LUNGS EVERY 4 HOURS AS NEEDED FOR SHORTNESS OF BREATH / DYSPNEA OR WHEEZING 18 Inhaler 3     zolpidem (AMBIEN) 5 MG tablet TAKE 1 TABLET BY MOUTH ONCE NIGHTLY AS NEEDED 30 tablet 5     OTC products: Aspirin    Allergies   Allergen Reactions     Lisinopril Cough      Latex Allergy: NO    Social History     Tobacco Use     Smoking status: Former Smoker     Smokeless tobacco: Never Used     Tobacco comment: quit 2 years ago   Substance Use Topics     Alcohol use: Yes     Alcohol/week: 0.0 oz     History   Drug Use No       REVIEW OF SYSTEMS:   CONSTITUTIONAL: NEGATIVE for fever, chills, change in weight  INTEGUMENTARY/SKIN: NEGATIVE for worrisome rashes, moles or lesions  EYES: NEGATIVE for vision changes or irritation  ENT/MOUTH: NEGATIVE for ear, mouth and throat problems  RESP: NEGATIVE for significant cough or SOB  CV: NEGATIVE for lower  "extremity edema, orthopnea, palpitations, paroxysmal nocturnal dyspnea and tachycardia  GI: NEGATIVE for nausea, abdominal pain, heartburn, or change in bowel habits  : NEGATIVE for frequency, dysuria, or hematuria  MUSCULOSKELETAL: NEGATIVE for significant arthralgias or myalgia  NEURO: NEGATIVE for weakness, dizziness or paresthesias  ENDOCRINE: NEGATIVE for temperature intolerance, skin/hair changes. POSITIVE for prediabetes.  HEME: NEGATIVE for bleeding problems  PSYCHIATRIC: NEGATIVE for changes in mood or affect    EXAM:   /70 (BP Location: Left arm, Patient Position: Chair, Cuff Size: Adult Regular)   Pulse 80   Temp 97.5  F (36.4  C) (Oral)   Resp 18   Ht 1.778 m (5' 10\")   Wt 83.9 kg (185 lb)   SpO2 95%   BMI 26.54 kg/m      GENERAL APPEARANCE: alert, active and no distress     EYES: Eyes grossly normal to inspection     HENT: normal cephalic/atraumatic     NECK: no adenopathy     RESP: lungs clear to auscultation - no rales, rhonchi or wheezes     CV: regular rates and rhythm with systolic ehection murmur heard best at the apex.       MS: extremities normal- no gross deformities noted, no evidence of inflammation in joints, FROM in all extremities.     SKIN: no suspicious lesions or rashes     NEURO: Normal strength and tone, sensory exam grossly normal, mentation intact and speech normal     PSYCH: mentation appears normal. and affect normal/bright         DIAGNOSTICS:   EKG shows sinus rhythm, first degree AV block, anterior fascicular block. In comparison to June 2018 EKG, ST-T changes ar anterior-lateral leads has resolved.  INR 1.17 Abnormally high   H  0.86 - 1.14  Final 01/09/2019  8:56 AM     Sodium 136   133 - 144 mmol/L Final 01/09/2019  8:56 AM MG   Potassium 4.5   3.4 - 5.3 mmol/L Final 01/09/2019  8:56 AM MG   Chloride 104   94 - 109 mmol/L Final 01/09/2019  8:56 AM MG   Carbon Dioxide 26   20 - 32 mmol/L Final 01/09/2019  8:56 AM MG   Anion Gap 6   3 - 14 mmol/L Final " 01/09/2019  8:56 AM MG   Glucose 118 Abnormally high   H  70 - 99 mg/dL Final 01/09/2019  8:56 AM MG   Comment:   Fasting specimen   Urea Nitrogen 15   7 - 30 mg/dL Final 01/09/2019  8:56 AM MG   Creatinine 1.04   0.66 - 1.25 mg/dL Final 01/09/2019  8:56 AM MG   GFR Estimate 72   >60 mL/min/ Final 01/09/2019  8:56 AM MG   Comment:   Non  GFR Calc   Starting 12/18/2018, serum creatinine based estimated GFR (eGFR) will be   calculated using the Chronic Kidney Disease Epidemiology Collaboration   (CKD-EPI) equation.    GFR Estimate If Black 83   >60 mL/min/ Final 01/09/2019  8:56 AM MG   Comment:    GFR Calc   Starting 12/18/2018, serum creatinine based estimated GFR (eGFR) will be   calculated using the Chronic Kidney Disease Epidemiology Collaboration   (CKD-EPI) equation.    Calcium 8.4 Abnormally low   L  8.5 - 10.1 mg/dL Final 01/09/2019  8:56 AM MG   Bilirubin Total 1.2   0.2 - 1.3 mg/dL Final 01/09/2019  8:56 AM MG   Albumin 3.5   3.4 - 5.0 g/dL Final 01/09/2019  8:56 AM MG   Protein Total 7.1   6.8 - 8.8 g/dL Final 01/09/2019  8:56 AM MG   Alkaline Phosphatase 94   40 - 150 U/L Final 01/09/2019  8:56 AM MG   ALT 12   0 - 70 U/L Final 01/09/2019  8:56 AM MG   AST 12   0 - 45 U/L Final 01/09/2019  8:56 AM MG     WBC 7.9   4.0 - 11.0 10e9/L Final 01/09/2019  8:56 AM BK   RBC Count 4.74   4.4 - 5.9 10e12/L Final 01/09/2019  8:56 AM BK   Hemoglobin 14.5   13.3 - 17.7 g/dL Final 01/09/2019  8:56 AM BK   Hematocrit 43.4   40.0 - 53.0 % Final 01/09/2019  8:56 AM BK   MCV 92   78 - 100 fl Final 01/09/2019  8:56 AM BK   MCH 30.6   26.5 - 33.0 pg Final 01/09/2019  8:56 AM BK   MCHC 33.4   31.5 - 36.5 g/dL Final 01/09/2019  8:56 AM BK   RDW 12.8   10.0 - 15.0 % Final 01/09/2019  8:56 AM BK   Platelet Count 240   150 - 450 10e9/L Final 01/09/2019  8:56 AM BK   % Neutrophils 72.3    % Final 01/09/2019  8:56 AM BK   % Lymphocytes 10.8    % Final 01/09/2019  8:56 AM BK   % Monocytes 12.5    %  Final 01/09/2019  8:56 AM BK   % Eosinophils 3.9    % Final 01/09/2019  8:56 AM BK   % Basophils 0.5    % Final 01/09/2019  8:56 AM BK   Absolute Neutrophil 5.7   1.6 - 8.3 10e9/L Final 01/09/2019  8:56 AM BK   Absolute Lymphocytes 0.9   0.8 - 5.3 10e9/L Final 01/09/2019  8:56 AM BK   Absolute Monocytes 1.0   0.0 - 1.3 10e9/L Final 01/09/2019  8:56 AM BK   Absolute Eosinophils 0.3   0.0 - 0.7 10e9/L Final 01/09/2019  8:56 AM BK   Absolute Basophils 0.0   0.0 - 0.2 10e9/L Final 01/09/2019  8:56 AM BK   Diff Method     Final 01/09/2019  8:56 AM BK     IMPRESSION:   Reason for surgery/procedure: Right common femoral endarterectomy.  Diagnosis/reason for consult: Preoperative evaluation.    The proposed surgical procedure is considered INTERMEDIATE risk.    REVISED CARDIAC RISK INDEX  The patient has the following serious cardiovascular risks for perioperative complications such as (MI, PE, VFib and 3  AV Block):  Coronary Artery Disease (MI, positive stress test, angina, Qs on EKG)  INTERPRETATION: 1 risks: Class II (low risk - 0.9% complication rate)    The patient has the following additional risks for perioperative complications:  No identified additional risks        RECOMMENDATIONS:     --Consult hospital rounder / IM to assist post-op medical management    --Patient is to take all scheduled medications on the day of surgery.    APPROVAL GIVEN to proceed with proposed procedure, without further diagnostic evaluation       Signed Electronically by: Kyle López MD    Copy of this evaluation report is provided to requesting physician.    [facility] Preop Guidelines    Revised Cardiac Risk Index

## 2019-01-09 NOTE — PATIENT INSTRUCTIONS
At [facility], we strive to deliver an exceptional experience to you, every time we see you.  If you receive a survey in the mail, please send us back your thoughts. We really do value your feedback.    Based on your medical history, these are the current health maintenance/preventive care services that you are due for (some may have been done at this visit.)  Health Maintenance Due   Topic Date Due     EYE EXAM Q1 YEAR  02/16/1951     ZOSTER IMMUNIZATION (1 of 2) 02/16/1998     FOOT EXAM Q1 YEAR  07/29/2017     A1C Q6 MO  10/25/2018     LIPID MONITORING Q1 YEAR  02/02/2019     MICROALBUMIN Q1 YEAR  02/02/2019         Suggested websites for health information:  Www.[employer] : Up to date and easily searchable information on multiple topics.  Www.medlineplus.gov : medication info, interactive tutorials, watch real surgeries online  Www.familydoctor.org : good info from the Academy of Family Physicians  Www.cdc.gov : public health info, travel advisories, epidemics (H1N1)  Www.aap.org : children's health info, normal development, vaccinations  Www.health.[facility].[place].us : [place] dept of health, public health issues in [place], N1N1    Your care team:                            Family Medicine Internal Medicine   MD Kyle Mario MD Shantel Branch-Fleming, MD Katya Georgiev PA-C Nam Ho, MD Pediatrics   RONI Ricci, MD Rohini Lanza CNP, MD Deborah Mielke, MD Kim Thein, APRN CNP      Clinic hours: Monday - Thursday 7 am-7 pm; Fridays 7 am-5 pm.   Urgent care: Monday - Friday 11 am-9 pm; Saturday and Sunday 9 am-5 pm.  Pharmacy : Monday -Thursday 8 am-8 pm; Friday 8 am-6 pm; Saturday and Sunday 9 am-5 pm.     Clinic: [phone]   Pharmacy: [phone]    Before Your Surgery      Call your surgeon if there is any change in your health. This includes signs of a cold or flu (such as a sore throat, runny nose,  cough, rash or fever).    Do not smoke, drink alcohol or take over the counter medicine (unless your surgeon or primary care doctor tells you to) for the 24 hours before and after surgery.    If you take prescribed drugs: Follow your doctor s orders about which medicines to take and which to stop until after surgery.    Eating and drinking prior to surgery: follow the instructions from your surgeon    Take a shower or bath the night before surgery. Use the soap your surgeon gave you to gently clean your skin. If you do not have soap from your surgeon, use your regular soap. Do not shave or scrub the surgery site.  Wear clean pajamas and have clean sheets on your bed.

## 2019-01-09 NOTE — TELEPHONE ENCOUNTER
Noted. Patient was just seen today and the notes are not signed by the provider as of yet. Waiting for provider.  Gloria Huber MA/  For [employer] and [employer]

## 2019-01-09 NOTE — TELEPHONE ENCOUNTER
Joseph was returning a call from clinic today.  Advised him that it was an outreach call that he was do for f/u visit for blood pressure check.    Joseph has an appointment scheduled with Dr. López for a pre-op physical tomorrow at 8:20 am.      Kathy Barr RN  [employer] [facility]

## 2019-01-09 NOTE — TELEPHONE ENCOUNTER
Reason for Call:  Other     Detailed comments: please fax pre-op notes and EKG to [facility] at [phone] jamil Chowdary. Thanks.     Phone Number Ricarda can be reached at: [phone]    Best Time: anytime     Can we leave a detailed message on this number? YES    Call taken on 1/9/2019 at 12:31 PM by Zachary Mascorro

## 2019-01-18 ENCOUNTER — TRANSFERRED RECORDS (OUTPATIENT)
Dept: HEALTH INFORMATION MANAGEMENT | Facility: CLINIC | Age: 69
End: 2019-01-18

## 2019-01-25 DIAGNOSIS — J44.9 COPD, MILD (H): ICD-10-CM

## 2019-01-25 NOTE — TELEPHONE ENCOUNTER
"Requested Prescriptions   Pending Prescriptions Disp Refills     INCRUSE ELLIPTA 62.5 MCG/INH Inhaler [Pharmacy Med Name: INCRUSE ELLIPTA 62.5 MCG INH]  11      Last Written Prescription Date:  02/02/18  Last Fill Quantity: 1,  # refills: 11   Last Office Visit with [facility], P or [facility] prescribing provider:  01/09/19-Maribel   Future Office Visit:    Next 5 appointments (look out 90 days)    Jan 31, 2019  3:20 PM [facility]  Office Visit with Kyle López MD  [facility] ([facility] [address] [place] [id number]  [phone]        Sig: INHALE 1 PUFF INTO THE LUNGS DAILY    Asthma Maintenance Inhalers - Anticholinergics Passed - 1/25/2019  1:58 AM       Passed - Patient is age 12 years or older       Passed - Recent (12 mo) or future (30 days) visit within the authorizing provider's specialty    Patient had office visit in the last 12 months or has a visit in the next 30 days with authorizing provider or within the authorizing provider's specialty.  See \"Patient Info\" tab in inbasket, or \"Choose Columns\" in Meds & Orders section of the refill encounter.             Passed - Medication is active on med list          "

## 2019-01-28 NOTE — TELEPHONE ENCOUNTER
Prescription approved per [facility] [facility].  Robyn Bucio RN

## 2019-01-31 ENCOUNTER — OFFICE VISIT (OUTPATIENT)
Dept: FAMILY MEDICINE | Facility: CLINIC | Age: 69
End: 2019-01-31
Payer: MEDICARE

## 2019-01-31 VITALS
HEIGHT: 70 IN | OXYGEN SATURATION: 98 % | TEMPERATURE: 97.4 F | SYSTOLIC BLOOD PRESSURE: 97 MMHG | DIASTOLIC BLOOD PRESSURE: 60 MMHG | WEIGHT: 174 LBS | RESPIRATION RATE: 24 BRPM | BODY MASS INDEX: 24.91 KG/M2 | HEART RATE: 77 BPM

## 2019-01-31 DIAGNOSIS — I25.118 CORONARY ARTERY DISEASE OF NATIVE HEART WITH STABLE ANGINA PECTORIS, UNSPECIFIED VESSEL OR LESION TYPE (H): ICD-10-CM

## 2019-01-31 DIAGNOSIS — D64.9 ACUTE ANEMIA: ICD-10-CM

## 2019-01-31 DIAGNOSIS — I73.9 PERIPHERAL VASCULAR DISEASE (H): ICD-10-CM

## 2019-01-31 DIAGNOSIS — I48.0 PAROXYSMAL ATRIAL FIBRILLATION (H): Primary | ICD-10-CM

## 2019-01-31 PROCEDURE — 99214 OFFICE O/P EST MOD 30 MIN: CPT | Performed by: INTERNAL MEDICINE

## 2019-01-31 PROCEDURE — 93000 ELECTROCARDIOGRAM COMPLETE: CPT | Performed by: INTERNAL MEDICINE

## 2019-01-31 PROCEDURE — 85025 COMPLETE CBC W/AUTO DIFF WBC: CPT | Performed by: INTERNAL MEDICINE

## 2019-01-31 PROCEDURE — 36415 COLL VENOUS BLD VENIPUNCTURE: CPT | Performed by: INTERNAL MEDICINE

## 2019-01-31 RX ORDER — OXYCODONE AND ACETAMINOPHEN 5; 325 MG/1; MG/1
TABLET ORAL
COMMUNITY
Start: 2018-05-21 | End: 2019-05-31

## 2019-01-31 ASSESSMENT — MIFFLIN-ST. JEOR: SCORE: 1555.51

## 2019-01-31 ASSESSMENT — PAIN SCALES - GENERAL: PAINLEVEL: NO PAIN (0)

## 2019-01-31 NOTE — PROGRESS NOTES
SUBJECTIVE:   Joseph Cradona is a 68 year old male who presents to clinic today for the following health issues:    Hospital Follow-up Visit:    Hospital/Nursing Home/ Rehab Facility: [facility]  Date of Admission: 1/18/19  Date of Discharge: 1/21/19  Reason(s) for Admission: Surgery Right femoral endarterectomy with Completion angiogram  Status: No change, low appetite            Problems taking medications regularly:  None       Medication changes since discharge: yes       Problems adhering to non-medication therapy:  None    Summary of hospitalization:  CareEverywhere information obtained and reviewed  Diagnostic Tests/Treatments reviewed.  Follow up needed: Yes  Other Healthcare Providers Involved in Patient s Care:         None  Update since discharge: improved.     Post Discharge Medication Reconciliation: discharge medications reconciled and changed, per note/orders (see AVS).  Plan of care communicated with patient     Coding guidelines for this visit:  Type of Medical   Decision Making Face-to-Face Visit       within 7 Days of discharge Face-to-Face Visit        within 14 days of discharge   Moderate Complexity [id number]   High Complexity 11225 [id number]                  Problem list and histories reviewed & adjusted, as indicated.  Additional history: as documented    Patient Active Problem List   Diagnosis     Hyperlipidemia LDL goal <70     Essential hypertension     Advanced directives, counseling/discussion     COPD, mild (H)     Coronary artery disease involving native coronary artery of native heart without angina pectoris     Positive hepatitis C antibody test     Paroxysmal atrial fibrillation (H)     Intermittent claudication (H)     Peripheral vascular disease (H)     S/P insertion of iliac artery stent     S/P left femoral endarterectomy     Coronary artery disease of native heart with stable angina pectoris, unspecified vessel or lesion type (H)     Past Surgical History:   Procedure  Laterality Date     CARDIAC SURGERY      Bypass double and quadruple     EYE SURGERY      cataract surgery     HEAD & NECK SURGERY      Caratid surgery     VASCULAR SURGERY         Social History     Tobacco Use     Smoking status: Former Smoker     Smokeless tobacco: Never Used     Tobacco comment: quit 2 years ago   Substance Use Topics     Alcohol use: Yes     Alcohol/week: 0.0 oz     History reviewed. No pertinent family history.      Allergies   Allergen Reactions     Lisinopril Cough     Recent Labs   Lab Test 01/09/19  0856 06/01/18  1157 04/25/18  1118 02/02/18  1130 06/26/17  1154  11/17/15  1212   A1C 5.8*  --  5.7*  --  5.6   < >  --    LDL 64  --   --  66 58   < >  --    HDL 33*  --   --  39* 36*   < >  --    TRIG 65  --   --  72 77   < >  --    ALT 12 14  --  17 15   < >  --    CR 1.04 1.04  --  1.13 1.03   < >  --    GFRESTIMATED 72 71  --  64 72   < >  --    GFRESTBLACK 83 85  --  78 87   < >  --    POTASSIUM 4.5 4.1  --  4.5 4.6   < >  --    TSH  --   --  2.60  --   --   --  4.71*    < > = values in this interval not displayed.      BP Readings from Last 3 Encounters:   01/31/19 97/60   01/09/19 140/70   11/12/18 142/67    Wt Readings from Last 3 Encounters:   01/31/19 78.9 kg (174 lb)   01/09/19 83.9 kg (185 lb)   06/19/18 79.4 kg (175 lb)                  Labs reviewed in EPIC    Reviewed and updated as needed this visit by clinical staff  Tobacco  Allergies  Med Hx  Surg Hx  Fam Hx  Soc Hx      Reviewed and updated as needed this visit by Provider         ROS:  CONSTITUTIONAL: NEGATIVE for fever, chills, change in weight  INTEGUMENTARY/SKIN: NEGATIVE for worrisome rashes, moles or lesions  EYES: NEGATIVE for vision changes or irritation  ENT/MOUTH: NEGATIVE for ear, mouth and throat problems  RESP: NEGATIVE for significant cough or SOB  CV: NEGATIVE for chest pain, palpitations or peripheral edema  GI: NEGATIVE for nausea, abdominal pain, heartburn, or change in bowel habits  : NEGATIVE for  "frequency, dysuria, or hematuria  MUSCULOSKELETAL: NEGATIVE for significant arthralgias or myalgia  NEURO: NEGATIVE for weakness, dizziness or paresthesias  ENDOCRINE: NEGATIVE for temperature intolerance, skin/hair changes  HEME: NEGATIVE for bleeding problems  PSYCHIATRIC: NEGATIVE for changes in mood or affect    OBJECTIVE:     BP 97/60 (BP Location: Left arm, Patient Position: Chair, Cuff Size: Adult Regular)   Pulse 77   Temp 97.4  F (36.3  C) (Oral)   Resp 24   Ht 1.778 m (5' 10\")   Wt 78.9 kg (174 lb)   SpO2 98%   BMI 24.97 kg/m    Body mass index is 24.97 kg/m .  GENERAL: healthy, alert and no distress  EYES: Eyes grossly normal to inspection, PERRL and conjunctivae and sclerae normal  HENT: ear canals and TM's normal, nose and mouth without ulcers or lesions  NECK: no adenopathy, no asymmetry, masses, or scars and thyroid normal to palpation  RESP: lungs clear to auscultation - no rales, rhonchi or wheezes  CV: regular rate and rhythm, normal S1 S2, no S3 or S4, no murmur, click or rub, no peripheral edema and peripheral pulses strong  ABDOMEN: soft, nontender, no hepatosplenomegaly, no masses and bowel sounds normal  MS: no gross musculoskeletal defects noted, no edema  SKIN: no suspicious lesions or rashes  NEURO: Normal strength and tone, mentation intact and speech normal  PSYCH: mentation appears normal, affect normal/bright    Diagnostic Test Results:  Results for orders placed or performed in visit on 01/31/19   CBC with platelets and differential   Result Value Ref Range    WBC 11.7 (H) 4.0 - 11.0 10e9/L    RBC Count 3.92 (L) 4.4 - 5.9 10e12/L    Hemoglobin 11.9 (L) 13.3 - 17.7 g/dL    Hematocrit 36.5 (L) 40.0 - 53.0 %    MCV 93 78 - 100 fl    MCH 30.4 26.5 - 33.0 pg    MCHC 32.6 31.5 - 36.5 g/dL    RDW 14.3 10.0 - 15.0 %    Platelet Count 486 (H) 150 - 450 10e9/L    % Neutrophils 85.0 %    % Lymphocytes 8.0 %    % Monocytes 6.0 %    % Eosinophils 1.0 %    Absolute Neutrophil 10.0 (H) 1.6 - " 8.3 10e9/L    Absolute Lymphocytes 0.9 0.8 - 5.3 10e9/L    Absolute Monocytes 0.7 0.0 - 1.3 10e9/L    Absolute Eosinophils 0.1 0.0 - 0.7 10e9/L    RBC Morphology Normal     Platelet Estimate       Automated count confirmed.  Platelet morphology is normal.    Diff Method Manual Differential        ASSESSMENT/PLAN:     1. Paroxysmal atrial fibrillation (H)    - apixaban ANTICOAGULANT (ELIQUIS) 5 MG tablet; Take 5 mg by mouth 2 times daily  - EKG 12-lead complete w/read - Clinics    2. Peripheral vascular disease (H)    - apixaban ANTICOAGULANT (ELIQUIS) 5 MG tablet; Take 5 mg by mouth 2 times daily    3. Acute anemia    - CBC with platelets and differential; Standing  - CBC with platelets and differential    4. Coronary artery disease of native heart with stable angina pectoris, unspecified vessel or lesion type (H)    - EKG 12-lead complete w/read - Clinics    Follow-up visit if condition worsens.    Kyle López MD  [facility]

## 2019-01-31 NOTE — PATIENT INSTRUCTIONS
At [facility], we strive to deliver an exceptional experience to you, every time we see you.  If you receive a survey in the mail, please send us back your thoughts. We really do value your feedback.    Based on your medical history, these are the current health maintenance/preventive care services that you are due for (some may have been done at this visit.)  Health Maintenance Due   Topic Date Due     EYE EXAM Q1 YEAR  02/16/1951     ZOSTER IMMUNIZATION (1 of 2) 02/16/1998     FOOT EXAM Q1 YEAR  07/29/2017         Suggested websites for health information:  Www.[facility].[employer] : Up to date and easily searchable information on multiple topics.  Www.[employer].gov : medication info, interactive tutorials, watch real surgeries online  Www.familydoctor.org : good info from the Academy of Family Physicians  Www.cdc.gov : public health info, travel advisories, epidemics (H1N1)  Www.aap.org : children's health info, normal development, vaccinations  Www.health.[facility].[place].us : [place] dept of health, public health issues in [place], N1N1    Your care team:                            Family Medicine Internal Medicine   MD Kyle Mario MD Shantel Branch-Fleming, MD Katya Georgiev PA-C Nam Ho, MD Pediatrics   RONI Ricci, MARLENE NOONAN CNP   MD Rohini Birmingham MD Deborah Mielke, MD Kim Thein, APRN CNP      Clinic hours: Monday - Thursday 7 am-7 pm; Fridays 7 am-5 pm.   Urgent care: Monday - Friday 11 am-9 pm; Saturday and Sunday 9 am-5 pm.  Pharmacy : Monday -Thursday 8 am-8 pm; Friday 8 am-6 pm; Saturday and Sunday 9 am-5 pm.     Clinic: [phone]   Pharmacy: [phone]

## 2019-02-01 ENCOUNTER — TELEPHONE (OUTPATIENT)
Dept: INTERNAL MEDICINE | Facility: CLINIC | Age: 69
End: 2019-02-01

## 2019-02-01 LAB
DIFFERENTIAL METHOD BLD: ABNORMAL
EOSINOPHIL # BLD AUTO: 0.1 10E9/L (ref 0–0.7)
EOSINOPHIL NFR BLD AUTO: 1 %
ERYTHROCYTE [DISTWIDTH] IN BLOOD BY AUTOMATED COUNT: 14.3 % (ref 10–15)
HCT VFR BLD AUTO: 36.5 % (ref 40–53)
HGB BLD-MCNC: 11.9 G/DL (ref 13.3–17.7)
LYMPHOCYTES # BLD AUTO: 0.9 10E9/L (ref 0.8–5.3)
LYMPHOCYTES NFR BLD AUTO: 8 %
MCH RBC QN AUTO: 30.4 PG (ref 26.5–33)
MCHC RBC AUTO-ENTMCNC: 32.6 G/DL (ref 31.5–36.5)
MCV RBC AUTO: 93 FL (ref 78–100)
MONOCYTES # BLD AUTO: 0.7 10E9/L (ref 0–1.3)
MONOCYTES NFR BLD AUTO: 6 %
NEUTROPHILS # BLD AUTO: 10 10E9/L (ref 1.6–8.3)
NEUTROPHILS NFR BLD AUTO: 85 %
PLATELET # BLD AUTO: 486 10E9/L (ref 150–450)
PLATELET # BLD EST: ABNORMAL 10*3/UL
RBC # BLD AUTO: 3.92 10E12/L (ref 4.4–5.9)
RBC MORPH BLD: NORMAL
WBC # BLD AUTO: 11.7 10E9/L (ref 4–11)

## 2019-02-01 NOTE — TELEPHONE ENCOUNTER
This writer attempted to contact Joseph on 02/01/19      Reason for call results and left message.      If patient calls back:   Patient contacted by a Registered Nurse. Inform patient that someone from the RN group will contact them, document that pt called and route to P DYAD 3 RN POOL [[id number]]        Shawna Becerra RN       Notes recorded by Maribel, Kyle Colbert MD on 2/1/2019 at 3:54 PM CST  Fabian,           In comparison to our lab draw, 3 weeks ago, hemoglobin is much lower. However, let's simply watch your hemoglobin and repeat your test as discussed during your visit.     Dr. López  (call patient)

## 2019-02-04 ENCOUNTER — TRANSFERRED RECORDS (OUTPATIENT)
Dept: HEALTH INFORMATION MANAGEMENT | Facility: CLINIC | Age: 69
End: 2019-02-04

## 2019-02-04 NOTE — TELEPHONE ENCOUNTER
This writer attempted to contact Joseph on 02/04/19      Reason for call results and left message.      If patient calls back:   Registered Nurse called. Follow Triage Call workflow        Maryann Gutierrez RN

## 2019-02-05 NOTE — TELEPHONE ENCOUNTER
Reason for call:  Other   Patient called regarding (reason for call): call back  Additional comments: patient returning missed call    Phone number to reach patient:  Home number on file [phone] (home)    Best Time:  any    Can we leave a detailed message on this number?  YES

## 2019-02-05 NOTE — TELEPHONE ENCOUNTER
Gave patient results and he said that he was taken off aspirin. Discontinued aspirin from medication list to update. Patient had no further questions or concerns at this time.         Jeffry Zapata RN, BSN

## 2019-02-19 ENCOUNTER — TRANSFERRED RECORDS (OUTPATIENT)
Dept: HEALTH INFORMATION MANAGEMENT | Facility: CLINIC | Age: 69
End: 2019-02-19

## 2019-03-20 ENCOUNTER — TRANSFERRED RECORDS (OUTPATIENT)
Dept: HEALTH INFORMATION MANAGEMENT | Facility: CLINIC | Age: 69
End: 2019-03-20

## 2019-03-26 DIAGNOSIS — I10 ESSENTIAL HYPERTENSION WITH GOAL BLOOD PRESSURE LESS THAN 140/90: ICD-10-CM

## 2019-03-26 DIAGNOSIS — I25.118 CORONARY ARTERY DISEASE OF NATIVE HEART WITH STABLE ANGINA PECTORIS, UNSPECIFIED VESSEL OR LESION TYPE (H): ICD-10-CM

## 2019-03-26 NOTE — TELEPHONE ENCOUNTER
"Requested Prescriptions   Pending Prescriptions Disp Refills     clopidogrel (PLAVIX) 75 MG tablet [Pharmacy Med Name: CLOPIDOGREL 75 MG TABLET] 90 tablet 3    Last Written Prescription Date:  2/2/18  Last Fill Quantity: 90,  # refills: 3   Last Office Visit with [facility] or [facility] prescribing provider:  1/31/19   Future Office Visit:      Sig: TAKE 1 TABLET (75 MG) BY MOUTH DAILY    Plavix Failed - 3/26/2019  1:34 AM       Failed - Normal HGB on file in past 12 months    Recent Labs   Lab Test 01/31/19  1614   HGB 11.9*              Failed - Normal Platelets on file in past 12 months    Recent Labs   Lab Test 01/31/19  1614   *              Passed - No active PPI on record unless is Protonix       Passed - Recent (12 mo) or future (30 days) visit within the authorizing provider's specialty    Patient had office visit in the last 12 months or has a visit in the next 30 days with authorizing provider or within the authorizing provider's specialty.  See \"Patient Info\" tab in inbasket, or \"Choose Columns\" in Meds & Orders section of the refill encounter.             Passed - Medication is active on med list       Passed - Patient is age 18 or older        losartan (COZAAR) 50 MG tablet [Pharmacy Med Name: LOSARTAN POTASSIUM 50 MG TAB] 90 tablet 3    Last Written Prescription Date:  2/2/18  Last Fill Quantity: 90,  # refills: 3   Last Office Visit with [facility] or [facility] prescribing provider:  1/31/19   Future Office Visit:      Sig: TAKE 1 TABLET (50 MG) BY MOUTH DAILY    Angiotensin-II Receptors Passed - 3/26/2019  1:34 AM       Passed - Blood pressure under 140/90 in past 12 months    BP Readings from Last 3 Encounters:   01/31/19 97/60   01/09/19 140/70   11/12/18 142/67                Passed - Recent (12 mo) or future (30 days) visit within the authorizing provider's specialty    Patient had office visit in the last 12 months or has a visit in the next 30 days with authorizing provider or within the " "authorizing provider's specialty.  See \"Patient Info\" tab in inbasket, or \"Choose Columns\" in Meds & Orders section of the refill encounter.             Passed - Medication is active on med list       Passed - Patient is age 18 or older       Passed - Normal serum creatinine on file in past 12 months    Recent Labs   Lab Test 01/09/19  0856   CR 1.04            Passed - Normal serum potassium on file in past 12 months    Recent Labs   Lab Test 01/09/19  0856   POTASSIUM 4.5                      "

## 2019-03-27 RX ORDER — CLOPIDOGREL BISULFATE 75 MG/1
75 TABLET ORAL DAILY
Qty: 90 TABLET | Refills: 3 | Status: SHIPPED | OUTPATIENT
Start: 2019-03-27 | End: 2020-03-17

## 2019-03-27 RX ORDER — LOSARTAN POTASSIUM 50 MG/1
50 TABLET ORAL DAILY
Qty: 90 TABLET | Refills: 1 | Status: SHIPPED | OUTPATIENT
Start: 2019-03-27 | End: 2019-05-31

## 2019-03-27 NOTE — TELEPHONE ENCOUNTER
Prescription for Losartan approved per [facility].    Routing refill request for Plavix to provider for review/approval because:  Labs out of range:  Hgb and PLT  Fails protocol    Maryann Gutierrez RN

## 2019-03-28 ENCOUNTER — TRANSFERRED RECORDS (OUTPATIENT)
Dept: HEALTH INFORMATION MANAGEMENT | Facility: CLINIC | Age: 69
End: 2019-03-28

## 2019-04-03 ENCOUNTER — OFFICE VISIT (OUTPATIENT)
Dept: FAMILY MEDICINE | Facility: CLINIC | Age: 69
End: 2019-04-03
Payer: MEDICARE

## 2019-04-03 VITALS
BODY MASS INDEX: 24.2 KG/M2 | HEIGHT: 70 IN | RESPIRATION RATE: 18 BRPM | WEIGHT: 169 LBS | DIASTOLIC BLOOD PRESSURE: 64 MMHG | TEMPERATURE: 97.7 F | SYSTOLIC BLOOD PRESSURE: 112 MMHG | HEART RATE: 99 BPM | OXYGEN SATURATION: 99 %

## 2019-04-03 DIAGNOSIS — N39.0 BACTERIAL UTI: Primary | ICD-10-CM

## 2019-04-03 DIAGNOSIS — R82.90 NONSPECIFIC FINDING ON EXAMINATION OF URINE: ICD-10-CM

## 2019-04-03 DIAGNOSIS — A49.9 BACTERIAL UTI: Primary | ICD-10-CM

## 2019-04-03 DIAGNOSIS — J44.9 COPD, MILD (H): ICD-10-CM

## 2019-04-03 DIAGNOSIS — R39.9 UTI SYMPTOMS: ICD-10-CM

## 2019-04-03 LAB
ALBUMIN UR-MCNC: 30 MG/DL
APPEARANCE UR: ABNORMAL
BACTERIA #/AREA URNS HPF: ABNORMAL /HPF
BILIRUB UR QL STRIP: ABNORMAL
COLOR UR AUTO: ABNORMAL
GLUCOSE UR STRIP-MCNC: NEGATIVE MG/DL
HGB UR QL STRIP: ABNORMAL
KETONES UR STRIP-MCNC: ABNORMAL MG/DL
LEUKOCYTE ESTERASE UR QL STRIP: ABNORMAL
NITRATE UR QL: POSITIVE
PH UR STRIP: 5.5 PH (ref 5–7)
RBC #/AREA URNS AUTO: ABNORMAL /HPF
SOURCE: ABNORMAL
SP GR UR STRIP: 1.02 (ref 1–1.03)
UROBILINOGEN UR STRIP-ACNC: 1 EU/DL (ref 0.2–1)
WBC #/AREA URNS AUTO: >100 /HPF

## 2019-04-03 PROCEDURE — 81001 URINALYSIS AUTO W/SCOPE: CPT | Performed by: PHYSICIAN ASSISTANT

## 2019-04-03 PROCEDURE — 87086 URINE CULTURE/COLONY COUNT: CPT | Performed by: PHYSICIAN ASSISTANT

## 2019-04-03 PROCEDURE — 87088 URINE BACTERIA CULTURE: CPT | Performed by: PHYSICIAN ASSISTANT

## 2019-04-03 PROCEDURE — 99213 OFFICE O/P EST LOW 20 MIN: CPT | Performed by: PHYSICIAN ASSISTANT

## 2019-04-03 PROCEDURE — 87186 SC STD MICRODIL/AGAR DIL: CPT | Performed by: PHYSICIAN ASSISTANT

## 2019-04-03 RX ORDER — CEPHALEXIN 500 MG/1
500 CAPSULE ORAL 3 TIMES DAILY
Qty: 21 CAPSULE | Refills: 0 | Status: SHIPPED | OUTPATIENT
Start: 2019-04-03 | End: 2019-04-29

## 2019-04-03 ASSESSMENT — PAIN SCALES - GENERAL: PAINLEVEL: NO PAIN (0)

## 2019-04-03 ASSESSMENT — MIFFLIN-ST. JEOR: SCORE: 1527.83

## 2019-04-03 NOTE — PATIENT INSTRUCTIONS
At [facility], we strive to deliver an exceptional experience to you, every time we see you.  If you receive a survey in the mail, please send us back your thoughts. We really do value your feedback.    Based on your medical history, these are the current health maintenance/preventive care services that you are due for (some may have been done at this visit.)  Health Maintenance Due   Topic Date Due     EYE EXAM Q1 YEAR  02/16/1951     ZOSTER IMMUNIZATION (1 of 2) 02/16/1998     FOOT EXAM Q1 YEAR  07/29/2017     MEDICARE ANNUAL WELLNESS VISIT  11/08/2017     FALL RISK ASSESSMENT  05/01/2019         Suggested websites for health information:  Www.[place].org : Up to date and easily searchable information on multiple topics.  Www.medlineplus.gov : medication info, interactive tutorials, watch real surgeries online  Www.familydoctor.org : good info from the Academy of Family Physicians  Www.cdc.gov : public health info, travel advisories, epidemics (H1N1)  Www.aap.org : children's health info, normal development, vaccinations  Www.health.[facility].[place].us : [place] dept of health, public health issues in [place], N1N1    Your care team:                            Family Medicine Internal Medicine   MD Kyle Mario MD Shantel Branch-Fleming, MD Katya Georgiev PA-C Nam Ho, MD Pediatrics   RONI Ricci, MD Rohini Lanza CNP, MD Deborah Mielke, MD Kim Thein, APRN [facility]      Clinic hours: Monday - Thursday 7 am-7 pm; Fridays 7 am-5 pm.   Urgent care: Monday - Friday 11 am-9 pm; Saturday and Sunday 9 am-5 pm.  Pharmacy : Monday -Thursday 8 am-8 pm; Friday 8 am-6 pm; Saturday and Sunday 9 am-5 pm.     Clinic: [phone]   Pharmacy: [phone]    Patient Education     Bladder Infection, Male (Adult)    You have a bladder infection.  Urine is normally free of bacteria. But bacteria can get into the urinary tract from the  skin around the rectum or it may travel in the blood from elsewhere in the body.  This is called a urinary tract infection (UTI). An infection can occur anywhere in the urinary tract. It could be in a kidney (pyelonephritis)or in the bladder (cystitis) and urethra (urethritis). The urethra is the tube that drains the urine from the bladder through the tip of the penis.  The most common place for a UTI is in the bladder. This is called a bladder infection. Most bladder infections are easily treated. They are not serious unless the infection spreads up to the kidney.  The terms bladder infection, UTI, and cystitis are often used to describe the same thing, but they aren t always the same. Cystitis is an inflammation of the bladder. The most common cause of cystitis is an infection.   Keep in mind:    Infections in the urine are called UTIs.    Cystitis is usually caused by a UTI.    Not all UTIs and cases of cystitis are bladder infections.    Bladder infections are the most common type of cystitis.  Symptoms of a bladder infection  The infection causes inflammation in the urethra and bladder. This inflammation causes many of the symptoms. The most common symptoms of a bladder infection are:    Pain or burning when urinating    Having to go more often than usual    Feeling like you need to go right away    Only a small amount comes out    Blood in urine    Discomfort in your belly (abdomen), usually in the lower abdomen, above the pubic bone    Cloudy, strong, or bad smelling urine    Unable to urinate (retention)    Urinary incontinence    Fever    Loss of appetite  Older adults may also feel confused.  Causes of a bladder infection  Bladder infections are not contagious. You can't get one from someone else, from a toilet seat, or from sharing a bath.  The most common cause of bladder infections is bacteria from the bowels. The bacteria get onto the skin around the opening of the urethra. From there they can get into  the urine and travel up to the bladder. This causes inflammation and an infection. This usually happens because of:    An enlarged prostate    Poor cleaning of the genitals    Procedures that put a tube in your bladder, like a Mari catheter    Bowel incontinence    Older age    Not emptying your bladder (The urine stays there, giving the bacteria a chance to grow.)    Dehydration (This allows urine to stay in the bladder longer.)    Constipation (This can cause the bowels to push on the bladder or urethra and keep the bladder from emptying.)  Treatment  Bladder infections are treated with antibiotics. They usually clear up quickly without complications. Treatment helps prevent a more serious kidney infection.  Medicines  Medicines can help in the treatment of a bladder infection:    You may have been given phenazopyridine to ease burning when you urinate. It will cause your urine to be bright orange. It can stain clothing.    You may have been prescribed antibiotics. Take this medicine until you have finished it, even if you feel better. Taking all of the medicine will make sure the infection has cleared.  You can use acetaminophen or ibuprofen for pain, fever, or discomfort, unless another medicine was prescribed. You can also alternate them, or use both together. They work differently and are a different class of medicines, so taking them together is not an overdose. If you have chronic liver or kidney disease, talk with your healthcare provider before using these medicines. Also talk with your provider if you ve had a stomach ulcer or GI bleeding or are taking blood thinner medicines.  Home care  Here are some guidelines to help you care for yourself at home:    Drink plenty of fluids, unless your healthcare provider told you not to. Fluids will prevent dehydration and flush out your bladder.    Use good personal hygiene. Wipe from front to back after using the toilet, and clean your penis regularly. If you  aren t circumcised, retract the foreskin when cleaning.    Urinate more frequently, and don t try to hold it in for long periods of time, if possible.    Wear loose-fitting clothes and cotton underwear. Avoid tight-fitting pants. This helps keep you clean and dry.    Change your diet to prevent constipation. This means eating more fresh foods and more fiber, and less junk and fatty foods.    Avoid sex until your symptoms are gone.    Avoid caffeine, alcohol, and spicy foods. These can irritate the bladder.  Follow-up care  Follow up with your healthcare provider, or as advised if all symptoms have not cleared up within 5 days. It is important to keep your follow-up appointment. You can talk with your provider to see if you need more tests of the urinary tract. This is especially important if you have infections that keep coming back.  If a culture was done, you will be told if your treatment needs to be changed. If directed, you can call to find out the results.  If X-rays were taken, you will be told of any findings that may affect your care.  Call 911  Call 911 if any of these occur:    Trouble breathing    Difficulty waking up    Feeling confused    Fainting or loss of consciousness    Rapid heart rate  When to seek medical advice  Call your healthcare provider right away if any of these occur:    Fever of 100.4 F (38 C) or higher, or as directed by your healthcare provider    Your symptoms don t improve after 2 days of treatment    Back or abdominal pain that gets worse    Repeated vomiting, or you aren t able to keep medicine down    Weakness or dizziness  Date Last Reviewed: 10/1/2016    4092-3589 [employer]. [address] [place] [address], [place] [address]. All rights reserved. This information is not intended as a substitute for professional medical care. Always follow your healthcare professional's instructions.

## 2019-04-03 NOTE — PROGRESS NOTES
SUBJECTIVE:   Joseph Cardona is a 69 year old male who presents to clinic today for the following health issues:    Genitourinary - Male  Onset: 4 weeks    Description:   Dysuria (painful urination): YES, a little  Hematuria (blood in urine): no   Frequency: YES  Are you urinating at night : YES  Hesitancy (delay in urine): no   Retention (unable to empty): YES  Decrease in urinary flow: YES  Incontinence: no     Progression of Symptoms:  worsening    Accompanying Signs & Symptoms:  Fever: no   Back/Flank pain: no   Urethral discharge: no   Testicle lumps/masses/pain: no   Nausea and/or vomiting: no   Abdominal pain: no     History:   History of frequent UTI's: no   History of kidney stones: no   History of hernias: no   Personal or Family history of Prostate problems: no  Sexually active: no     Precipitating factors:   None     Alleviating factors:  None     -patient hasn't been able to eat for the past 2 days due to decrease in appetite  having some burping.  On plavix and apixaban for PVD< CAD s/p sx.    does sometimes get a little dizzy when standing     Hx COPDm stable currently, no hx DM            Allergies   Allergen Reactions     Lisinopril Cough       Past Medical History:   Diagnosis Date     Heart disease      Hypertension        Patient Active Problem List   Diagnosis     Hyperlipidemia LDL goal <70     Essential hypertension     Advanced directives, counseling/discussion     COPD, mild (H)     Coronary artery disease involving native coronary artery of native heart without angina pectoris     Positive hepatitis C antibody test     Paroxysmal atrial fibrillation (H)     Intermittent claudication (H)     Peripheral vascular disease (H)     S/P insertion of iliac artery stent     S/P left femoral endarterectomy     Coronary artery disease of native heart with stable angina pectoris, unspecified vessel or lesion type (H)         Current Outpatient Medications on File Prior to Visit:  apixaban  "ANTICOAGULANT (ELIQUIS) 5 MG tablet Take 5 mg by mouth 2 times daily   atorvastatin (LIPITOR) 40 MG tablet Take 1 tablet (40 mg) by mouth daily   carvedilol (COREG) 6.25 MG tablet TAKE 1 TABLET BY MOUTH TWO TIMES DAILY WITH MEALS   clopidogrel (PLAVIX) 75 MG tablet TAKE 1 TABLET (75 MG) BY MOUTH DAILY   furosemide (LASIX) 20 MG tablet TAKE 1 TABLET (20 MG) BY MOUTH DAILY   INCRUSE ELLIPTA 62.5 MCG/INH Inhaler INHALE 1 PUFF INTO THE LUNGS DAILY   losartan (COZAAR) 25 MG tablet Take 1 tablet (25 mg) by mouth daily   losartan (COZAAR) 50 MG tablet TAKE 1 TABLET (50 MG) BY MOUTH DAILY   nitroGLYcerin (NITROSTAT) 0.4 MG sublingual tablet PLACE 1 TABLET UNDER THE TONGUE EVERY 5 MINUTES AS NEEDED FOR CHEST PAIN.   oxyCODONE-acetaminophen (PERCOCET) 5-325 MG tablet 1-2 tabs by mouth every 4 hours prn for pain   ranitidine (ZANTAC) 150 MG tablet TAKE 1 TABLET (150 MG) BY MOUTH 2 TIMES DAILY   VENTOLIN  (90 Base) MCG/ACT Inhaler INHALE 2 PUFFS INTO THE LUNGS EVERY 4 HOURS AS NEEDED FOR SHORTNESS OF BREATH / DYSPNEA OR WHEEZING   zolpidem (AMBIEN) 5 MG tablet TAKE 1 TABLET BY MOUTH ONCE NIGHTLY AS NEEDED     No current facility-administered medications on file prior to visit.     Social History     Tobacco Use     Smoking status: Former Smoker     Smokeless tobacco: Never Used     Tobacco comment: quit 2 years ago   Substance Use Topics     Alcohol use: Yes     Alcohol/week: 0.0 oz     Drug use: No       History reviewed. No pertinent family history.    ROS:  General: negative for fever  Resp:hx COPD  CV: orthostatics as above   polyuria as above    OBJECTIVE:  /64 (BP Location: Right arm, Patient Position: Chair, Cuff Size: Adult Regular)   Pulse 99   Temp 97.7  F (36.5  C) (Oral)   Resp 18   Ht 1.778 m (5' 10\")   Wt 76.7 kg (169 lb)   SpO2 99%   BMI 24.25 kg/m     General:   awake, alert, and cooperative.  NAD.   Head: Normocephalic, atraumatic.  Eyes: Conjunctiva clear,   Heart: Regular rate and " rhythm. No murmur.  Lungs: Chest is clear; no wheezes or rales.   Neuro: Alert and oriented - normal speech.    UA c/w UTI  Patient not currently orthostatic    ASSESSMENT:    ICD-10-CM    1. Bacterial UTI N39.0 cephALEXin (KEFLEX) 500 MG capsule    A49.9    2. UTI symptoms R39.9 UA reflex to Microscopic and Culture     Urine Microscopic   3. Nonspecific finding on examination of urine R82.90 Urine Culture Aerobic Bacterial   4. COPD, mild (H) J44.9        PLAN:   Follow up:  prn  Advised about symptoms which might herald more serious problems.

## 2019-04-05 ENCOUNTER — TELEPHONE (OUTPATIENT)
Dept: FAMILY MEDICINE | Facility: CLINIC | Age: 69
End: 2019-04-05

## 2019-04-05 DIAGNOSIS — N39.0 UTI (URINARY TRACT INFECTION), BACTERIAL: Primary | ICD-10-CM

## 2019-04-05 DIAGNOSIS — A49.9 UTI (URINARY TRACT INFECTION), BACTERIAL: Primary | ICD-10-CM

## 2019-04-05 LAB
BACTERIA SPEC CULT: ABNORMAL
SPECIMEN SOURCE: ABNORMAL

## 2019-04-05 RX ORDER — SULFAMETHOXAZOLE/TRIMETHOPRIM 800-160 MG
1 TABLET ORAL 2 TIMES DAILY
Qty: 14 TABLET | Refills: 0 | Status: SHIPPED | OUTPATIENT
Start: 2019-04-05 | End: 2019-05-31

## 2019-04-05 NOTE — TELEPHONE ENCOUNTER
Please call patient and let him know his urine culture grew bacteria resistant to the anitbiotic Prescribed at his visit.  HE should stop that and start the new one I have ordered now,    Rashad Abbott PA-C

## 2019-04-05 NOTE — TELEPHONE ENCOUNTER
Called and informed the patient of the results as written.    Jacquelyn Finn RN, [facility]

## 2019-04-15 ENCOUNTER — TRANSFERRED RECORDS (OUTPATIENT)
Dept: HEALTH INFORMATION MANAGEMENT | Facility: CLINIC | Age: 69
End: 2019-04-15

## 2019-04-29 ENCOUNTER — OFFICE VISIT (OUTPATIENT)
Dept: FAMILY MEDICINE | Facility: CLINIC | Age: 69
End: 2019-04-29
Payer: MEDICARE

## 2019-04-29 VITALS
WEIGHT: 165.2 LBS | HEIGHT: 70 IN | HEART RATE: 83 BPM | DIASTOLIC BLOOD PRESSURE: 69 MMHG | RESPIRATION RATE: 18 BRPM | SYSTOLIC BLOOD PRESSURE: 115 MMHG | TEMPERATURE: 97.5 F | BODY MASS INDEX: 23.65 KG/M2 | OXYGEN SATURATION: 100 %

## 2019-04-29 DIAGNOSIS — N39.0 UTI (URINARY TRACT INFECTION), BACTERIAL: ICD-10-CM

## 2019-04-29 DIAGNOSIS — R30.0 DYSURIA: Primary | ICD-10-CM

## 2019-04-29 DIAGNOSIS — R82.90 NONSPECIFIC FINDING ON EXAMINATION OF URINE: ICD-10-CM

## 2019-04-29 DIAGNOSIS — A49.9 UTI (URINARY TRACT INFECTION), BACTERIAL: ICD-10-CM

## 2019-04-29 LAB
ALBUMIN UR-MCNC: ABNORMAL MG/DL
APPEARANCE UR: ABNORMAL
BACTERIA #/AREA URNS HPF: ABNORMAL /HPF
BILIRUB UR QL STRIP: NEGATIVE
COLOR UR AUTO: ABNORMAL
GLUCOSE UR STRIP-MCNC: NEGATIVE MG/DL
HGB UR QL STRIP: ABNORMAL
KETONES UR STRIP-MCNC: NEGATIVE MG/DL
LEUKOCYTE ESTERASE UR QL STRIP: ABNORMAL
NITRATE UR QL: NEGATIVE
NON-SQ EPI CELLS #/AREA URNS LPF: ABNORMAL /LPF
PH UR STRIP: 6 PH (ref 5–7)
RBC #/AREA URNS AUTO: ABNORMAL /HPF
SOURCE: ABNORMAL
SP GR UR STRIP: 1.02 (ref 1–1.03)
UROBILINOGEN UR STRIP-ACNC: >=8 EU/DL (ref 0.2–1)
WBC #/AREA URNS AUTO: >100 /HPF

## 2019-04-29 PROCEDURE — 81001 URINALYSIS AUTO W/SCOPE: CPT | Performed by: PHYSICIAN ASSISTANT

## 2019-04-29 PROCEDURE — 87186 SC STD MICRODIL/AGAR DIL: CPT | Performed by: PHYSICIAN ASSISTANT

## 2019-04-29 PROCEDURE — 87086 URINE CULTURE/COLONY COUNT: CPT | Performed by: PHYSICIAN ASSISTANT

## 2019-04-29 PROCEDURE — 87088 URINE BACTERIA CULTURE: CPT | Performed by: PHYSICIAN ASSISTANT

## 2019-04-29 PROCEDURE — 99213 OFFICE O/P EST LOW 20 MIN: CPT | Performed by: PHYSICIAN ASSISTANT

## 2019-04-29 RX ORDER — NITROFURANTOIN 25; 75 MG/1; MG/1
100 CAPSULE ORAL 2 TIMES DAILY
Qty: 14 CAPSULE | Refills: 0 | Status: SHIPPED | OUTPATIENT
Start: 2019-04-29 | End: 2019-05-31

## 2019-04-29 ASSESSMENT — MIFFLIN-ST. JEOR: SCORE: 1510.59

## 2019-04-29 ASSESSMENT — PAIN SCALES - GENERAL: PAINLEVEL: SEVERE PAIN (7)

## 2019-04-29 NOTE — PROGRESS NOTES
SUBJECTIVE:   Joseph Cardona is a 69 year old male who presents to clinic today for the following   health issues:      Genitourinary - Male  Onset: x1 week    Description:   Dysuria (painful urination): YES  Hematuria (blood in urine): YES  Frequency: YES  Are you urinating at night : YES, Makes it hard to sleep  Hesitancy (delay in urine): YES  Retention (unable to empty): YES  Decrease in urinary flow: YES  Incontinence: YES    Progression of Symptoms:  same    Accompanying Signs & Symptoms:  Fever: no   Back/Flank pain: no   Urethral discharge: no   Testicle lumps/masses/pain: no   Nausea and/or vomiting: no   Abdominal pain: no     History:   History of frequent UTI's: YES  History of kidney stones: no   History of hernias: no   Personal or Family history of Prostate problems: no  Sexually active: no     Precipitating factors:   None    Alleviating factors:  None    tx'd by me earlier this month for cefazolin/cefoxitin resistant Ecoli UTI    A1C in January waa 5.2    Pt states he also has a hard time with BM's. He feels like he needs to use the bathroom but cannot. Uses mercedes-lax.           Allergies   Allergen Reactions     Lisinopril Cough       Patient Active Problem List   Diagnosis     Hyperlipidemia LDL goal <70     Essential hypertension     Advanced directives, counseling/discussion     COPD, mild (H)     Coronary artery disease involving native coronary artery of native heart without angina pectoris     Positive hepatitis C antibody test     Paroxysmal atrial fibrillation (H)     Intermittent claudication (H)     Peripheral vascular disease (H)     S/P insertion of iliac artery stent     S/P left femoral endarterectomy     Coronary artery disease of native heart with stable angina pectoris, unspecified vessel or lesion type (H)       Past Medical History:   Diagnosis Date     Heart disease      Hypertension          Current Outpatient Medications on File Prior to Visit:  apixaban ANTICOAGULANT  "(ELIQUIS) 5 MG tablet Take 5 mg by mouth 2 times daily   atorvastatin (LIPITOR) 40 MG tablet Take 1 tablet (40 mg) by mouth daily   carvedilol (COREG) 6.25 MG tablet TAKE 1 TABLET BY MOUTH TWO TIMES DAILY WITH MEALS   clopidogrel (PLAVIX) 75 MG tablet TAKE 1 TABLET (75 MG) BY MOUTH DAILY   furosemide (LASIX) 20 MG tablet TAKE 1 TABLET (20 MG) BY MOUTH DAILY   INCRUSE ELLIPTA 62.5 MCG/INH Inhaler INHALE 1 PUFF INTO THE LUNGS DAILY   losartan (COZAAR) 25 MG tablet Take 1 tablet (25 mg) by mouth daily   losartan (COZAAR) 50 MG tablet TAKE 1 TABLET (50 MG) BY MOUTH DAILY   nitroGLYcerin (NITROSTAT) 0.4 MG sublingual tablet PLACE 1 TABLET UNDER THE TONGUE EVERY 5 MINUTES AS NEEDED FOR CHEST PAIN.   oxyCODONE-acetaminophen (PERCOCET) 5-325 MG tablet 1-2 tabs by mouth every 4 hours prn for pain   ranitidine (ZANTAC) 150 MG tablet TAKE 1 TABLET (150 MG) BY MOUTH 2 TIMES DAILY   VENTOLIN  (90 Base) MCG/ACT Inhaler INHALE 2 PUFFS INTO THE LUNGS EVERY 4 HOURS AS NEEDED FOR SHORTNESS OF BREATH / DYSPNEA OR WHEEZING   zolpidem (AMBIEN) 5 MG tablet TAKE 1 TABLET BY MOUTH ONCE NIGHTLY AS NEEDED   [] sulfamethoxazole-trimethoprim (BACTRIM DS) 800-160 MG tablet Take 1 tablet by mouth 2 times daily for 7 days     No current facility-administered medications on file prior to visit.     Social History     Tobacco Use     Smoking status: Former Smoker     Smokeless tobacco: Never Used     Tobacco comment: quit 2 years ago   Substance Use Topics     Alcohol use: Yes     Alcohol/week: 0.0 oz     Drug use: No       ROS:   GEN: NO fevers   + as above  ENDO no hx DM    OBJECTIVE:  /69 (BP Location: Left arm, Patient Position: Sitting, Cuff Size: Adult Regular)   Pulse 83   Temp 97.5  F (36.4  C) (Oral)   Resp 18   Ht 1.778 m (5' 10\")   Wt 74.9 kg (165 lb 3.2 oz)   SpO2 100%   BMI 23.70 kg/m     General:   awake, alert, and cooperative.  NAD.   Head: Normocephalic, atraumatic.  Eyes: Conjunctiva clear,   Lungs: " Regular rate  Neuro: Alert and oriented - normal speech.    ASSESSMENT:well appearing    ICD-10-CM    1. Dysuria R30.0 UA reflex to Microscopic and Culture     Urine Microscopic   2. Nonspecific finding on examination of urine R82.90 Urine Culture Aerobic Bacterial   3. UTI (urinary tract infection), bacterial N39.0 nitroFURantoin macrocrystal-monohydrate (MACROBID) 100 MG capsule    A49.9        PLAN:   Follow up: as needed  Advised about symptoms which might herald more serious problems.

## 2019-04-29 NOTE — PATIENT INSTRUCTIONS
At [facility], we strive to deliver an exceptional experience to you, every time we see you.  If you receive a survey in the mail, please send us back your thoughts. We really do value your feedback.    Your care team:                            Family Medicine Internal Medicine   MD Kyle Mario MD Shantel Branch-Fleming, MD Katya Georgiev PA-C Megan Hill, APRN CNP    Sabas Aranda, MD Pediatrics   Rashad Abbott, RONI Vera, MD Meli Lorenzana APRN CNP   MD Rohini Birmingham MD Deborah Mielke, MD Radha Sierra, APRN [facility]      Clinic hours: Monday - Thursday 7 am-7 pm; Fridays 7 am-5 pm.   Urgent care: Monday - Friday 11 am-9 pm; Saturday and Sunday 9 am-5 pm.  Pharmacy : Monday -Thursday 8 am-8 pm; Friday 8 am-6 pm; Saturday and Sunday 9 am-5 pm.     Clinic: [phone]   Pharmacy: [phone]        Patient Education     Urinary Tract Infections in Men    Urinary tract infections (UTIs) are most often caused by bacteria that invade the urinary tract. The bacteria may come from outside the body. Or they may travel from the skin outside of the rectum into the urethra. Pain in or around the urinary tract is a common symptom for most UTIs. But the only way to know for sure if you have a UTI is to have a urinalysis and urine culture.   Types of UTIs    Cystitis. This is a bladder infection. It is often linked to a blockage from an enlarged prostate. You may have an urgent or frequent need to urinate, and bloody urine. Treatment includes antibiotics and medicine to relax or shrink the prostate. Sometimes you will need surgery.    Urethritis. This is an infection of the urethra. You may have a discharge from the urethra or burning when you urinate. You may also have pain in the urethra or penis. It is treated with antibiotics.    Prostatitis. This is an inflammation or infection of the prostate. You may have an urgent or frequent need to  urinate, fever, or burning when you urinate. Or you may have a tender prostate, or a vague feeling of pressure. Prostatitis is treated with a range of medicines, depending on the cause.    Pyelonephritis. This is a kidney infection. If not treated, it can be serious and damage your kidneys. In severe cases you may need to stay in the hospital. You may have a fever and lower back pain.  Medicines to treat a UTI  Most UTIs are treated with antibiotics. These kill the bacteria. The length of time you need to take them depends on the type of infection. Take antibiotics exactly as directed until all of the medicine is gone. If you don't, the infection may not go away and may become harder to treat. For certain types of UTIs, you may be given other medicine to help treat your symptoms.  Lifestyle changes to treat and prevent UTIs  The lifestyle changes below will help get rid of your current infection. They may also help prevent future UTIs.    Drink plenty of fluids such as water, juice, or other caffeine-free drinks. This helps flush bacteria out of your system.    Empty your bladder when you feel the urge to urinate and before going to sleep. Urine that stays in your bladder promotes infection.    Use condoms during sex. These help prevent UTIs caused by sexually transmitted bacteria.    Keep follow-up appointments with your healthcare provider. He or she can may do tests to make sure the infection has cleared. If needed, more treatment can be started.  Other treatments to prevent UTIs  Most UTIs respond to medicine. But sometimes you will need a procedure or surgery. This can treat an enlarged prostate, or remove a kidney stone or other blockage. Surgery may also treat problems caused by scarring or long-term infections.  Date Last Reviewed: 1/1/2017 2000-2017 [employer]. [address] [place] [address], [place] [address]. All rights reserved. This information is not intended as a substitute for professional  medical care. Always follow your healthcare professional's instructions.

## 2019-05-01 DIAGNOSIS — I25.118 CORONARY ARTERY DISEASE OF NATIVE HEART WITH STABLE ANGINA PECTORIS, UNSPECIFIED VESSEL OR LESION TYPE (H): ICD-10-CM

## 2019-05-01 DIAGNOSIS — I10 ESSENTIAL HYPERTENSION WITH GOAL BLOOD PRESSURE LESS THAN 140/90: ICD-10-CM

## 2019-05-01 LAB
BACTERIA SPEC CULT: ABNORMAL
SPECIMEN SOURCE: ABNORMAL

## 2019-05-01 NOTE — TELEPHONE ENCOUNTER
In chart as Historical, unsure if it was ever sent?    carvedilol (COREG) 6.25 MG tablet 180 tablet 3 11/12/2018  No   Sig: TAKE 1 TABLET BY MOUTH TWO TIMES DAILY WITH MEALS   Class: Historical   Order: [id number]

## 2019-05-02 RX ORDER — CARVEDILOL 6.25 MG/1
TABLET ORAL
Qty: 180 TABLET | Refills: 3 | Status: SHIPPED | OUTPATIENT
Start: 2019-05-02 | End: 2020-01-01

## 2019-05-02 NOTE — TELEPHONE ENCOUNTER
Routing refill request to provider for review/approval because:  Medication is reported/historical    Maryann Gutierrez RN

## 2019-05-17 ENCOUNTER — TELEPHONE (OUTPATIENT)
Dept: PHARMACY | Facility: CLINIC | Age: 69
End: 2019-05-17

## 2019-05-17 NOTE — TELEPHONE ENCOUNTER
Spoke with patient to schedule 6 month Medication Therapy Management visit.  Scheduled.    Lila Carter, PharmD  Medication Therapy Management Pharmacist  [phone]

## 2019-05-31 ENCOUNTER — OFFICE VISIT (OUTPATIENT)
Dept: PHARMACY | Facility: CLINIC | Age: 69
End: 2019-05-31
Payer: COMMERCIAL

## 2019-05-31 VITALS — DIASTOLIC BLOOD PRESSURE: 58 MMHG | SYSTOLIC BLOOD PRESSURE: 114 MMHG

## 2019-05-31 DIAGNOSIS — G47.00 INSOMNIA, UNSPECIFIED TYPE: ICD-10-CM

## 2019-05-31 DIAGNOSIS — I10 ESSENTIAL HYPERTENSION: Primary | ICD-10-CM

## 2019-05-31 DIAGNOSIS — E78.5 HYPERLIPIDEMIA LDL GOAL <70: ICD-10-CM

## 2019-05-31 DIAGNOSIS — K21.9 GASTROESOPHAGEAL REFLUX DISEASE WITHOUT ESOPHAGITIS: ICD-10-CM

## 2019-05-31 DIAGNOSIS — J44.9 COPD, MILD (H): ICD-10-CM

## 2019-05-31 PROCEDURE — 99605 MTMS BY PHARM NP 15 MIN: CPT | Performed by: PHARMACIST

## 2019-05-31 PROCEDURE — 99607 MTMS BY PHARM ADDL 15 MIN: CPT | Performed by: PHARMACIST

## 2019-05-31 NOTE — PROGRESS NOTES
SUBJECTIVE/OBJECTIVE:                Joseph Cardona is a 69 year old male coming in for a follow-up visit for Medication Therapy Management.  He was referred to me from [employer] insurance plan.     Chief Complaint: Follow up from our visit on 11/12/18.  This will serve as an initial visit for 2019. No concerns - med review.    Tobacco: History of tobacco dependence - quit August 2015   Alcohol: not currently using    Medication Adherence: No issues. Adherence is excellent; denies any missed doses. He sets up own med boxes and takes meds 2 times per day.     Hx STEMI/HTN: Currently taking losartan 25mg daily ([facility] cardiologist may have 50 mg daily on file, pt currently takes 1/2 of a 50 mg), carvedilol 6.25mg BID, furosemide 20mg daily, Eliquis 5 mg daily and Plavix 75mg daily.  Aspirin was discontinued after he was in the ER for a shaving cut that wouldn't stop bleeding.  Also has nitrostat 0.4mg SL on-hand for chest pain, has used this in the last month, that was the reason he went into see cardiology.  Denies abnormal bruising/bleeding or other side effects. Hx of 3 or 4 heat attacks. Denies lightheadedness or orthostatic hypotension. Continues to complain of significant urination with furosemide. Denies any hx edema. Does not check BP outside of clinic.  BP Readings from Last 3 Encounters:   04/29/19 115/69   04/03/19 112/64   01/31/19 97/60     Hyperlipidemia/PVD: Current therapy includes atorvastatin 80mg once daily, this was increased last week by cardiology d/t chest pain and NTG use in the last couple of weeks.  Pt reports no significant myalgias or other side effects.  Recent Labs   Lab Test 01/09/19  0856 02/02/18  1130   CHOL 110 119   HDL 33* 39*   LDL 64 66   TRIG 65 72     COPD: Current medications: Incruse once daily and Albuterol used rarely, once every two weeks.  Cost is not a problem for inhalers.  Pt reports the following symptoms: none.  Pt does not have an COPD Action Plan on  file.   Has spirometry been completed: Yes      Insomnia: Current medications include: zolpidem 5mg 1-2 times a week. Pt reports trouble staying asleep, but that the zolpidem is effective when he uses it. No reported SE.     GERD: Current medications include: ranitidine 150mg BID. No symptoms; pt feels his regimen is effective. No history of ulcer or GI bleed that he is aware of. Is on dual anticoagulant therapy.    Immunization: reviewed history, has not had Shingrix yet    Today's Vitals: /58       ASSESSMENT:              Current medications were reviewed today as discussed above.      Medication Adherence: excellent, no issues identified    Hx STEMI/HTN: Stable. Pt has been on losartan 25 mg (not 50 mg) for the last two cardiology appointments and BP is at goal < 130/80.     Hyperlipidemia/PVD: Stable, tolerating increased atorvastatin dose.    COPD: Stable.     Insomnia: Stable.     GERD: Stable, appropriate to continue ranitidine d/t dual anticoagulant therapy.    Immunization: recommend Shingrix     PLAN:                  1. I will contact your cardiologist and ask what dose of losartan you should be taking (I will call you). - left message with cardiology about dosing on 5/31/19.    2. Request a refill on zolpidem through your pharmacy.    3. Remind pt at follow up to get Shingrix.    I spent 30 minutes with this patient today (an extra 15 minutes was spent creating the Medication Action Plan). A copy of the visit note was provided to the patient's primary care provider.     Will follow up as needed.    The patient was given a summary of these recommendations as an after visit summary.    Lila Carter, PharmD  Medication Therapy Management Pharmacist  [phone]

## 2019-05-31 NOTE — LETTER
"     [facility] MTM     Date: 2019    Joseph Cardona  [address] [facility] [address]    Dear Mr. Cardona,    Thank you for talking with me on 19 about your health and medications. Medicare s MTM (Medication Therapy Management) program helps you understand your medications and use them safely.      This letter includes an action plan (Medication Action Plan) and medication list (Personal Medication List). The action plan has steps you should take to help you get the best results from your medications. The medication list will help you keep track of your medications and how to use them the right way.       Have your action plan and medication list with you when you talk with your doctors, pharmacists, and other healthcare providers in your care team.     Ask your doctors, pharmacists, and other healthcare providers to update the action plan and medication list at every visit.     Take your medication list with you if you go to the hospital or emergency room.     Give a copy of the action plan and medication list to your family or caregivers.     If you want to talk about this letter or any of the papers with it, please call   [phone].   We look forward to working with you, your doctors, and other healthcare providers to help you stay healthy.     Sincerely,    Chinyere Carter      Enclosed: Medication Action Plan and Personal Medication List    MEDICATION ACTION PLAN FOR Joseph Cardona,  1950     This action plan will help you get the best results from your medications if you:   1. Read \"What we talked about.\"   2. Take the steps listed in the \"What I need to do\" boxes.   3. Fill in \"What I did and when I did it.\"   4. Fill in \"My follow-up plan\" and \"Questions I want to ask.\"     Have this action plan with you when you talk with your doctors, pharmacists, and other healthcare providers in your care team. Share this with your family or caregivers " too.  DATE PREPARED: 2019    My follow-up plan:    1. I will contact your cardiologist and ask what dose of losartan you should be taking (I will call you).     2. Request a refill on zolpidem through your pharmacy.       Questions I want to ask:              If you have any questions about your action plan, call Chinyere Carter, Phone: [phone] , Monday-Friday 8-4:30pm.           MEDICATION LIST FOR Joseph Cardona, PEPE 1950     This medication list was made for you after we talked. We also used information from your doctor's chart.      Use blank rows to add new medications. Then fill in the dates you started using them.    Cross out medications when you no longer use them. Then write the date and why you stopped using them.    Ask your doctors, pharmacists, and other healthcare providers to update this list at every visit. Keep this list up-to-date with:       Prescription medications    Over the counter drugs     Herbals    Vitamins    Minerals      If you go to the hospital or emergency room, take this list with you. Share this with your family or caregivers too.     DATE PREPARED: 2019  Allergies or side effects: Lisinopril     Medication:  APIXABAN 5 MG PO TABS      How I use it:  Take 5 mg by mouth 2 times daily      Why I use it: Paroxysmal atrial fibrillation (H); Peripheral vascular disease (H)    Prescriber:  Dr. Faulkner      Date I started using it:       Date I stopped using it:         Why I stopped using it:            Medication:  CARVEDILOL 6.25 MG PO TABS      How I use it:  TAKE 1 TABLET BY MOUTH TWO TIMES DAILY WITH MEALS      Why I use it: Essential hypertension with goal blood pressure less than 140/90; Coronary artery disease of native heart with stable angina pectoris, unspecified vessel or lesion type (H)    Prescriber:  Lisa Cadena MD      Date I started using it:       Date I stopped using it:         Why I stopped using it:            Medication:  CLOPIDOGREL  BISULFATE 75 MG PO TABS      How I use it:  TAKE 1 TABLET (75 MG) BY MOUTH DAILY      Why I use it: Coronary artery disease of native heart with stable angina pectoris, unspecified vessel or lesion type (H)    Prescriber:  Kyle López MD      Date I started using it:       Date I stopped using it:         Why I stopped using it:            Medication:  FUROSEMIDE 20 MG PO TABS      How I use it:  TAKE 1 TABLET (20 MG) BY MOUTH DAILY      Why I use it: Essential hypertension with goal blood pressure less than 140/90    Prescriber:  Kyle López MD      Date I started using it:       Date I stopped using it:         Why I stopped using it:            Medication:  INCRUSE ELLIPTA 62.5 MCG/INH IN AEPB      How I use it:  INHALE 1 PUFF INTO THE LUNGS DAILY      Why I use it: COPD, mild (H)    Prescriber:  Kyle López MD      Date I started using it:       Date I stopped using it:         Why I stopped using it:            Medication:  LOSARTAN POTASSIUM 25 MG PO TABS      How I use it:  Take 1 tablet (25 mg) by mouth daily      Why I use it: Essential hypertension with goal blood pressure less than 140/90    Prescriber:   Dr. Faulkner      Date I started using it:       Date I stopped using it:         Why I stopped using it:            Medication:  NITROGLYCERIN 0.4 MG SL SUBL      How I use it:  PLACE 1 TABLET UNDER THE TONGUE EVERY 5 MINUTES AS NEEDED FOR CHEST PAIN.      Why I use it: Coronary artery disease of native heart with stable angina pectoris, unspecified vessel or lesion type (H)    Prescriber:  Kyle López MD      Date I started using it:       Date I stopped using it:         Why I stopped using it:            Medication:  RANITIDINE  MG PO TABS      How I use it:  TAKE 1 TABLET (150 MG) BY MOUTH 2 TIMES DAILY      Why I use it: Gastroesophageal reflux disease without esophagitis    Prescriber:  Kyle López MD      Date I started using it:       Date I  stopped using it:         Why I stopped using it:            Medication:  VENTOLIN  (90 BASE) MCG/ACT IN AERS      How I use it:  INHALE 2 PUFFS INTO THE LUNGS EVERY 4 HOURS AS NEEDED FOR SHORTNESS OF BREATH / DYSPNEA OR WHEEZING      Why I use it: COPD, mild (H)    Prescriber:  Kyle López MD      Date I started using it:       Date I stopped using it:         Why I stopped using it:            Medication:  ZOLPIDEM TARTRATE 5 MG PO TABS      How I use it:  TAKE 1 TABLET BY MOUTH ONCE NIGHTLY AS NEEDED      Why I use it: Persistent insomnia    Prescriber:  Kyle López MD      Date I started using it:       Date I stopped using it:         Why I stopped using it:            Medication:         How I use it:         Why I use it:      Prescriber:         Date I started using it:       Date I stopped using it:         Why I stopped using it:            Medication:         How I use it:         Why I use it:      Prescriber:         Date I started using it:       Date I stopped using it:         Why I stopped using it:            Medication:         How I use it:         Why I use it:      Prescriber:         Date I started using it:       Date I stopped using it:         Why I stopped using it:              Other Information:     If you have any questions about your action plan, call [phone].    According to the Paperwork Reduction Act of 1995, no persons are required to respond to a collection of information unless it displays a valid OMB control number. The valid OMB number for this information collection is [id number]. The time required to complete this information collection is estimated to average 40 minutes per response, including the time to review instructions, searching existing data resources, gather the data needed, and complete and review the information collection. If you have any comments concerning the accuracy of the time estimate(s) or suggestions for improving this form,  please write to: CMS, Attn: [employer] Clearance Officer, [address] [place] [address], [place], [place] [address].

## 2019-05-31 NOTE — PATIENT INSTRUCTIONS
Recommendations from today's MTM visit:                                                    MTM (medication therapy management) is a service provided by a clinical pharmacist designed to help you get the most of out of your medicines.   Today we reviewed what your medicines are for, how to know if they are working, that your medicines are safe and how to make your medicine regimen as easy as possible.     1. I will contact your cardiologist and ask what dose of losartan you should be taking (I will call you).    2. Request a refill on zolpidem through your pharmacy.    Next MTM visit: I will call you when I hear back from your cardiologist about lostartan.  At least every year and as needed.    To schedule another MTM appointment, please call the clinic directly or you may call the MTM scheduling line at [phone] or toll-free at [phone].     My Clinical Pharmacist's contact information:                                                      It was a pleasure talking with you today!  Please feel free to contact me with any questions or concerns you have.      Lila Carter, PharmD  Medication Therapy Management Pharmacist  [phone]    You may receive a survey about the MTM services you received by email and/or US Mail.  I would appreciate your feedback to help me serve you better in the future. Your comments will be anonymous.

## 2019-06-07 ENCOUNTER — TELEPHONE (OUTPATIENT)
Dept: PHARMACY | Facility: CLINIC | Age: 69
End: 2019-06-07

## 2019-06-07 NOTE — TELEPHONE ENCOUNTER
Follow Up Medication Therapy Management Phone Call:     Plan:  1. I confirmed with cardiologist that he should be on losartan 25 mg daily.   2. Encouraged patient to get Shingrix vaccine - pt will talk with Dr. López about this next week.     Follow-Up: 1 year or as needed.    Lila Carter, PharmD  Medication Therapy Management Pharmacist  [phone]

## 2019-06-07 NOTE — TELEPHONE ENCOUNTER
Called patient for Medication Therapy Management follow up, patient unavailable.  Left message.  Will try again.    Lila Carter, PharmD  Medication Therapy Management Pharmacist  [phone]       Concern for worsening infectious process/Concern for delay in diagnosis and treatment/Concern for cardiopulmonary deterioration

## 2019-06-17 ENCOUNTER — OFFICE VISIT (OUTPATIENT)
Dept: FAMILY MEDICINE | Facility: CLINIC | Age: 69
End: 2019-06-17
Payer: MEDICARE

## 2019-06-17 VITALS
WEIGHT: 170 LBS | TEMPERATURE: 98 F | SYSTOLIC BLOOD PRESSURE: 126 MMHG | DIASTOLIC BLOOD PRESSURE: 59 MMHG | BODY MASS INDEX: 24.34 KG/M2 | RESPIRATION RATE: 18 BRPM | HEIGHT: 70 IN | OXYGEN SATURATION: 97 % | HEART RATE: 73 BPM

## 2019-06-17 DIAGNOSIS — I73.9 PERIPHERAL VASCULAR DISEASE (H): ICD-10-CM

## 2019-06-17 DIAGNOSIS — Z01.818 PREOP GENERAL PHYSICAL EXAM: Primary | ICD-10-CM

## 2019-06-17 LAB
ALBUMIN SERPL-MCNC: 3.8 G/DL (ref 3.4–5)
ALP SERPL-CCNC: 78 U/L (ref 40–150)
ALT SERPL W P-5'-P-CCNC: 17 U/L (ref 0–70)
ANION GAP SERPL CALCULATED.3IONS-SCNC: 9 MMOL/L (ref 3–14)
AST SERPL W P-5'-P-CCNC: 12 U/L (ref 0–45)
BASOPHILS # BLD AUTO: 0.1 10E9/L (ref 0–0.2)
BASOPHILS NFR BLD AUTO: 0.8 %
BILIRUB SERPL-MCNC: 1.1 MG/DL (ref 0.2–1.3)
BUN SERPL-MCNC: 18 MG/DL (ref 7–30)
CALCIUM SERPL-MCNC: 9.1 MG/DL (ref 8.5–10.1)
CHLORIDE SERPL-SCNC: 106 MMOL/L (ref 94–109)
CO2 SERPL-SCNC: 25 MMOL/L (ref 20–32)
CREAT SERPL-MCNC: 0.99 MG/DL (ref 0.66–1.25)
DIFFERENTIAL METHOD BLD: ABNORMAL
EOSINOPHIL # BLD AUTO: 0.2 10E9/L (ref 0–0.7)
EOSINOPHIL NFR BLD AUTO: 2.7 %
ERYTHROCYTE [DISTWIDTH] IN BLOOD BY AUTOMATED COUNT: 15.2 % (ref 10–15)
GFR SERPL CREATININE-BSD FRML MDRD: 76 ML/MIN/{1.73_M2}
GLUCOSE SERPL-MCNC: 101 MG/DL (ref 70–99)
HCT VFR BLD AUTO: 39.7 % (ref 40–53)
HGB BLD-MCNC: 12.9 G/DL (ref 13.3–17.7)
INR PPP: 1.5 (ref 0.86–1.14)
LYMPHOCYTES # BLD AUTO: 1.4 10E9/L (ref 0.8–5.3)
LYMPHOCYTES NFR BLD AUTO: 21.5 %
MCH RBC QN AUTO: 29.5 PG (ref 26.5–33)
MCHC RBC AUTO-ENTMCNC: 32.5 G/DL (ref 31.5–36.5)
MCV RBC AUTO: 91 FL (ref 78–100)
MONOCYTES # BLD AUTO: 0.8 10E9/L (ref 0–1.3)
MONOCYTES NFR BLD AUTO: 12.3 %
NEUTROPHILS # BLD AUTO: 3.9 10E9/L (ref 1.6–8.3)
NEUTROPHILS NFR BLD AUTO: 62.7 %
PLATELET # BLD AUTO: 288 10E9/L (ref 150–450)
POTASSIUM SERPL-SCNC: 4.1 MMOL/L (ref 3.4–5.3)
PROT SERPL-MCNC: 7.5 G/DL (ref 6.8–8.8)
RBC # BLD AUTO: 4.38 10E12/L (ref 4.4–5.9)
SODIUM SERPL-SCNC: 140 MMOL/L (ref 133–144)
WBC # BLD AUTO: 6.3 10E9/L (ref 4–11)

## 2019-06-17 PROCEDURE — 93000 ELECTROCARDIOGRAM COMPLETE: CPT | Performed by: INTERNAL MEDICINE

## 2019-06-17 PROCEDURE — 99215 OFFICE O/P EST HI 40 MIN: CPT | Performed by: INTERNAL MEDICINE

## 2019-06-17 PROCEDURE — 80053 COMPREHEN METABOLIC PANEL: CPT | Performed by: INTERNAL MEDICINE

## 2019-06-17 PROCEDURE — 36415 COLL VENOUS BLD VENIPUNCTURE: CPT | Performed by: INTERNAL MEDICINE

## 2019-06-17 PROCEDURE — 85025 COMPLETE CBC W/AUTO DIFF WBC: CPT | Performed by: INTERNAL MEDICINE

## 2019-06-17 PROCEDURE — 85610 PROTHROMBIN TIME: CPT | Performed by: INTERNAL MEDICINE

## 2019-06-17 RX ORDER — ATORVASTATIN CALCIUM 80 MG/1
TABLET, FILM COATED ORAL
Refills: 3 | COMMUNITY
Start: 2019-05-20 | End: 2020-01-01

## 2019-06-17 ASSESSMENT — MIFFLIN-ST. JEOR: SCORE: 1532.36

## 2019-06-17 ASSESSMENT — PAIN SCALES - GENERAL: PAINLEVEL: NO PAIN (0)

## 2019-06-17 NOTE — LETTER
June 25, 2019      Joseph Cardona  [address] [facility] [address]      Mr. Cardona,                               Your recent laboratory tests show normal kidney and normal liver function tests, glucose is not within diabetic range, mild anemia and mildly high INR (which is not worrisome). Continue your current medications. For any questions, you may call my office at [phone].     Sincerely,     Kyle López MD   Internal Medicine       Resulted Orders   CBC with platelets and differential   Result Value Ref Range    WBC 6.3 4.0 - 11.0 10e9/L    RBC Count 4.38 (L) 4.4 - 5.9 10e12/L    Hemoglobin 12.9 (L) 13.3 - 17.7 g/dL    Hematocrit 39.7 (L) 40.0 - 53.0 %    MCV 91 78 - 100 fl    MCH 29.5 26.5 - 33.0 pg    MCHC 32.5 31.5 - 36.5 g/dL    RDW 15.2 (H) 10.0 - 15.0 %    Platelet Count 288 150 - 450 10e9/L    % Neutrophils 62.7 %    % Lymphocytes 21.5 %    % Monocytes 12.3 %    % Eosinophils 2.7 %    % Basophils 0.8 %    Absolute Neutrophil 3.9 1.6 - 8.3 10e9/L    Absolute Lymphocytes 1.4 0.8 - 5.3 10e9/L    Absolute Monocytes 0.8 0.0 - 1.3 10e9/L    Absolute Eosinophils 0.2 0.0 - 0.7 10e9/L    Absolute Basophils 0.1 0.0 - 0.2 10e9/L    Diff Method Automated Method    Comprehensive metabolic panel (BMP + Alb, Alk Phos, ALT, AST, Total. Bili, TP)   Result Value Ref Range    Sodium 140 133 - 144 mmol/L    Potassium 4.1 3.4 - 5.3 mmol/L    Chloride 106 94 - 109 mmol/L    Carbon Dioxide 25 20 - 32 mmol/L    Anion Gap 9 3 - 14 mmol/L    Glucose 101 (H) 70 - 99 mg/dL      Comment:      Fasting specimen    Urea Nitrogen 18 7 - 30 mg/dL    Creatinine 0.99 0.66 - 1.25 mg/dL    GFR Estimate 76 >60 mL/min/[1.73_m2]      Comment:      Non  GFR Calc  Starting 12/18/2018, serum creatinine based estimated GFR (eGFR) will be   calculated using the Chronic Kidney Disease Epidemiology Collaboration   (CKD-EPI) equation.      GFR Estimate If Black 88 >60 mL/min/[1.73_m2]      Comment:        GFR Calc  Starting 12/18/2018, serum creatinine based estimated GFR (eGFR) will be   calculated using the Chronic Kidney Disease Epidemiology Collaboration   (CKD-EPI) equation.      Calcium 9.1 8.5 - 10.1 mg/dL    Bilirubin Total 1.1 0.2 - 1.3 mg/dL    Albumin 3.8 3.4 - 5.0 g/dL    Protein Total 7.5 6.8 - 8.8 g/dL    Alkaline Phosphatase 78 40 - 150 U/L    ALT 17 0 - 70 U/L    AST 12 0 - 45 U/L   INR   Result Value Ref Range    INR 1.50 (H) 0.86 - 1.14      Comment:      This test is intended for monitoring Coumadin therapy.  Results are not   accurate in patients with prolonged INR due to factor deficiency.

## 2019-06-17 NOTE — PROGRESS NOTES
[address] [address]  [phone]  Dept: [phone]    PRE-OP EVALUATION:  Today's date: 2019    Joseph Cardona (: 1950) presents for pre-operative evaluation assessment as requested by Dr. Corona.  He requires evaluation and anesthesia risk assessment prior to undergoing surgery/procedure for treatment of Angiogram .    Proposed Surgery/ Procedure: Angiogram  Date of Surgery/ Procedure: 2019  Time of Surgery/ Procedure: 11:00AM  Hospital/Surgical Facility: [facility]  Fax number for surgical facility: [phone]  Primary Physician: Kyle López  Type of Anesthesia Anticipated: General    Patient has a Health Care Directive or Living Will:  NO    1. YES - DO YOU HAVE A HISTORY OF HEART ATTACK, STROKE, STENT, BYPASS OR SURGERY ON AN ARTERY IN THE HEAD, NECK, HEART OR LEG? Surgery of an artery and 2 bypass's  2. NO - Do you ever have any pain or discomfort in your chest?  3. YES - DO YOU HAVE A HISTORY OF HEART FAILURE   4. YES - ARE YOUR TROUBLED BY SHORTNESS OF BREATH WHEN WALKING ON THE LEVEL, UP A SLIGHT HILL OR AT NIGHT? When walking  5. NO - Do you currently have a cold, bronchitis or other respiratory infection?  6. NO - Do you have a cough, shortness of breath or wheezing?  7. YES - DO YOU SOMETIMES GET PAINS IN THE CALVES OF YOUR LEGS WHEN YOU WALK?   8. YES - DO YOU OR ANYONE IN YOUR FAMILY HAVE PREVIOUS HISTORY OF BLOOD CLOTS?   9. NO - Do you or does anyone in your family have a serious bleeding problem such as prolonged bleeding following surgeries or cuts?  10. NO - Have you ever had problems with anemia or been told to take iron pills?  11. NO - Have you had any abnormal blood loss such as black, tarry or bloody stools, or abnormal vaginal bleeding?  12. NO - Have you ever had a blood transfusion?  13. NO - Have you or any of your relatives ever had problems with anesthesia?  14. NO - Do you have sleep apnea,  excessive snoring or daytime drowsiness?  15. NO - Do you have any prosthetic heart valves?  16. NO - Do you have prosthetic joints?  17. NO - Is there any chance that you may be pregnant?      HPI:     HPI related to upcoming procedure:            This is a 69 year old male who comes in today for a preoperative evaluation. Mr. Cardona is scheduled to undergo peripheral angiogram and possible angioplasty of left lower extremity on 6/21/2019.       CAD - Patient has a longstanding history of CAD. NSTEMI last April 25, 2018 (admitted at [facility]), S/P drug-eluting stent placement of the mid body saphenous grafts and distal anastomosis of thr saphenous vein graft to the L[place]. Patient denies recent chest pain, exercise induced dyspnea or PND.                                                                                                                                PAD - The patient previously underwent bilateral common iliac artery and external iliac artery stent placement last January 2017 and right common femoral endarterectomy last January 18, 2019.                .    COPD - Patient has been doing well overall noting NO SYMPTOMS and continues on long-acting antimuscarinic agent (Incruse Ellipta) without any side effects.                                                                                    HYPERLIPIDEMIA - Patient has a long history of significant Hyperlipidemia requiring medication for treatment with recent good control. Patient reports no problems or side effects with Atorvastatin.                                                                                                                                                      .  HYPERTENSION - Patient currently denies any symptoms referable to elevated blood pressure. Blood pressure readings have been in normal range. Current medication regimen is as listed below. Patient denies any side effects     MEDICAL HISTORY:     Patient  Active Problem List    Diagnosis Date Noted     Coronary artery disease of native heart with stable angina pectoris, unspecified vessel or lesion type (H) 01/31/2019     Priority: Medium     S/P left femoral endarterectomy 06/05/2018     Priority: Medium     Peripheral vascular disease (H) 06/26/2017     Priority: Medium     S/P insertion of iliac artery stent 06/26/2017     Priority: Medium     Intermittent claudication (H) 11/09/2016     Priority: Medium     Positive hepatitis C antibody test 06/10/2016     Priority: Medium     Coronary artery disease involving native coronary artery of native heart without angina pectoris 06/09/2016     Priority: Medium     COPD, mild (H) 02/09/2016     Priority: Medium     Advanced directives, counseling/discussion 11/17/2015     Priority: Medium     Patient dont have HCD, declined to discuss at this time  Funmi King MA         Hyperlipidemia LDL goal <70 08/14/2015     Priority: Medium     Problem list name updated by automated process. Provider to review       Essential hypertension 08/14/2015     Priority: Medium     Paroxysmal atrial fibrillation (H) 08/14/2015     Priority: Medium      Past Medical History:   Diagnosis Date     Heart disease      Hypertension      Past Surgical History:   Procedure Laterality Date     CARDIAC SURGERY      Bypass double and quadruple     EYE SURGERY      cataract surgery     HEAD & NECK SURGERY      Caratid surgery     VASCULAR SURGERY       Current Outpatient Medications   Medication Sig Dispense Refill     apixaban ANTICOAGULANT (ELIQUIS) 5 MG tablet Take 5 mg by mouth 2 times daily       carvedilol (COREG) 6.25 MG tablet TAKE 1 TABLET BY MOUTH TWO TIMES DAILY WITH MEALS 180 tablet 3     clopidogrel (PLAVIX) 75 MG tablet TAKE 1 TABLET (75 MG) BY MOUTH DAILY 90 tablet 3     furosemide (LASIX) 20 MG tablet TAKE 1 TABLET (20 MG) BY MOUTH DAILY 90 tablet 3     INCRUSE ELLIPTA 62.5 MCG/INH Inhaler INHALE 1 PUFF INTO THE LUNGS DAILY 3  "Inhaler 1     losartan (COZAAR) 25 MG tablet Take 1 tablet (25 mg) by mouth daily       nitroGLYcerin (NITROSTAT) 0.4 MG sublingual tablet PLACE 1 TABLET UNDER THE TONGUE EVERY 5 MINUTES AS NEEDED FOR CHEST PAIN. 25 tablet 1     ranitidine (ZANTAC) 150 MG tablet TAKE 1 TABLET (150 MG) BY MOUTH 2 TIMES DAILY 180 tablet 3     VENTOLIN  (90 Base) MCG/ACT Inhaler INHALE 2 PUFFS INTO THE LUNGS EVERY 4 HOURS AS NEEDED FOR SHORTNESS OF BREATH / DYSPNEA OR WHEEZING 18 Inhaler 3     zolpidem (AMBIEN) 5 MG tablet TAKE 1 TABLET BY MOUTH ONCE NIGHTLY AS NEEDED 30 tablet 5     atorvastatin (LIPITOR) 80 MG tablet TAKE 1 TABLET BY MOUTH EVERYDAY AT BEDTIME  3     OTC products: Aspirin    Allergies   Allergen Reactions     Lisinopril Cough      Latex Allergy: NO    Social History     Tobacco Use     Smoking status: Former Smoker     Smokeless tobacco: Never Used     Tobacco comment: quit 2 years ago   Substance Use Topics     Alcohol use: Yes     Alcohol/week: 0.0 oz     History   Drug Use No       REVIEW OF SYSTEMS:   CONSTITUTIONAL: NEGATIVE for fever, chills, change in weight  INTEGUMENTARY/SKIN: NEGATIVE for worrisome rashes, moles or lesions  EYES: NEGATIVE for vision changes or irritation  ENT/MOUTH: NEGATIVE for ear, mouth and throat problems  RESP: NEGATIVE for significant cough or SOB  CV: NEGATIVE for chest pain, palpitations or peripheral edema  GI: NEGATIVE for nausea, abdominal pain, heartburn, or change in bowel habits  : NEGATIVE for frequency, dysuria, or hematuria  MUSCULOSKELETAL: NEGATIVE for significant arthralgias or myalgia  NEURO: NEGATIVE for weakness, dizziness or paresthesias  ENDOCRINE: POSITIVE  for prediabetes.  HEME: NEGATIVE for bleeding problems  PSYCHIATRIC: NEGATIVE for changes in mood or affect    EXAM:   /59 (BP Location: Left arm, Patient Position: Sitting, Cuff Size: Adult Regular)   Pulse 73   Temp 98  F (36.7  C) (Oral)   Resp 18   Ht 1.778 m (5' 10\")   Wt 77.1 kg (170 lb) "   SpO2 97%   BMI 24.39 kg/m      GENERAL APPEARANCE: healthy, alert and no distress     EYES: EOMI,  PERRL     HENT: oral mucous membranes moist     RESP: lungs clear to auscultation - no rales, rhonchi or wheezes     CV: regular rates and rhythm, normal S1 S2, no S3 or S4 and no murmur, click or rub     ABDOMEN:  soft, nontender, no HSM or masses and bowel sounds normal     MS: extremities normal- no gross deformities noted, no evidence of inflammation in joints, FROM in all extremities.     SKIN: no suspicious lesions or rashes     NEURO: Normal strength and tone, sensory exam grossly normal, mentation intact and speech normal     PSYCH: mentation appears normal. and affect normal/bright    DIAGNOSTICS:   EKG:   Sinus  Rhythm  -First degree A-V block  - occasional ectopic ventricular beat     -Old anteroseptal infarct.    Low voltage with rightward P-axis and rotation -possible pulmonary disease.     INR 1.50  High   0.86 - 1.14  Final 06/17/2019  1:14 PM BK     Sodium 140   133 - 144 mmol/L Final 06/17/2019  1:14 PM MG   Potassium 4.1   3.4 - 5.3 mmol/L Final 06/17/2019  1:14 PM MG   Chloride 106   94 - 109 mmol/L Final 06/17/2019  1:14 PM MG   Carbon Dioxide 25   20 - 32 mmol/L Final 06/17/2019  1:14 PM MG   Anion Gap 9   3 - 14 mmol/L Final 06/17/2019  1:14 PM MG   Glucose 101  High   70 - 99 mg/dL Final 06/17/2019  1:14 PM MG   Comment:   Fasting specimen   Urea Nitrogen 18   7 - 30 mg/dL Final 06/17/2019  1:14 PM MG   Creatinine 0.99   0.66 - 1.25 mg/dL Final 06/17/2019  1:14 PM MG   GFR Estimate 76   >60 mL/min/ Final 06/17/2019  1:14 PM MG   Comment:   Non  GFR Calc   Starting 12/18/2018, serum creatinine based estimated GFR (eGFR) will be   calculated using the Chronic Kidney Disease Epidemiology Collaboration   (CKD-EPI) equation.    GFR Estimate If Black 88   >60 mL/min/ Final 06/17/2019  1:14 PM MG   Comment:    GFR Calc   Starting 12/18/2018, serum creatinine based  estimated GFR (eGFR) will be   calculated using the Chronic Kidney Disease Epidemiology Collaboration   (CKD-EPI) equation.    Calcium 9.1   8.5 - 10.1 mg/dL Final 06/17/2019  1:14 PM MG   Bilirubin Total 1.1   0.2 - 1.3 mg/dL Final 06/17/2019  1:14 PM MG   Albumin 3.8   3.4 - 5.0 g/dL Final 06/17/2019  1:14 PM MG   Protein Total 7.5   6.8 - 8.8 g/dL Final 06/17/2019  1:14 PM MG   Alkaline Phosphatase 78   40 - 150 U/L Final 06/17/2019  1:14 PM MG   ALT 17   0 - 70 U/L Final 06/17/2019  1:14 PM MG   AST 12   0 - 45 U/L Final 06/17/2019  1:14 PM M           Recent Labs   Lab Test 01/31/19  1614 01/09/19  0856 06/01/18  1157 04/25/18  1118   HGB 11.9* 14.5 13.4 13.7   * 240 203 234   INR  --  1.17* 1.14  --    NA  --  136 139  --    POTASSIUM  --  4.5 4.1  --    CR  --  1.04 1.04  --    A1C  --  5.8*  --  5.7*      WBC 6.3   4.0 - 11.0 10e9/L Final 06/17/2019  1:14 PM BK   RBC Count 4.38  Low   4.4 - 5.9 10e12/L Final 06/17/2019  1:14 PM BK   Hemoglobin 12.9  Low   13.3 - 17.7 g/dL Final 06/17/2019  1:14 PM BK   Hematocrit 39.7  Low   40.0 - 53.0 % Final 06/17/2019  1:14 PM BK   MCV 91   78 - 100 fl Final 06/17/2019  1:14 PM BK   MCH 29.5   26.5 - 33.0 pg Final 06/17/2019  1:14 PM BK   MCHC 32.5   31.5 - 36.5 g/dL Final 06/17/2019  1:14 PM BK   RDW 15.2  High   10.0 - 15.0 % Final 06/17/2019  1:14 PM BK   Platelet Count 288   150 - 450 10e9/L Final 06/17/2019  1:14 PM BK   % Neutrophils 62.7    % Final 06/17/2019  1:14 PM BK   % Lymphocytes 21.5    % Final 06/17/2019  1:14 PM BK   % Monocytes 12.3    % Final 06/17/2019  1:14 PM BK   % Eosinophils 2.7    % Final 06/17/2019  1:14 PM BK   % Basophils 0.8    % Final 06/17/2019  1:14 PM BK   Absolute Neutrophil 3.9   1.6 - 8.3 10e9/L Final 06/17/2019  1:14 PM BK   Absolute Lymphocytes 1.4   0.8 - 5.3 10e9/L Final 06/17/2019  1:14 PM BK   Absolute Monocytes 0.8   0.0 - 1.3 10e9/L Final 06/17/2019  1:14 PM BK   Absolute Eosinophils 0.2   0.0 - 0.7 10e9/L Final  06/17/2019  1:14 PM BK   Absolute Basophils 0.1   0.0 - 0.2 10e9/L Final 06/17/2019  1:14 PM BK   Diff Method     Final 06/17/2019  1:14 PM BK         IMPRESSION:   Reason for surgery/procedure: Peripheral angiogram and possible angioplasty of left lower extremity.  Diagnosis/reason for consult: Preoperative evaluation.    The proposed surgical procedure is considered INTERMEDIATE risk.    REVISED CARDIAC RISK INDEX  The patient has the following serious cardiovascular risks for perioperative complications such as (MI, PE, VFib and 3  AV Block):  Coronary Artery Disease (MI, positive stress test, angina, Qs on EKG)  INTERPRETATION: 2 risks: Class III (moderate risk - 6.6% complication rate)    The patient has the following additional risks for perioperative complications:  No identified additional risks      ICD-10-CM    1. Preop general physical exam Z01.818        RECOMMENDATIONS:         --Patient is to take all scheduled medications on the day of surgery EXCEPT for over-the-counter medications.    APPROVAL GIVEN to proceed with proposed procedure, without further diagnostic evaluation       Signed Electronically by: Kyle López MD    Copy of this evaluation report is provided to requesting physician.    [facility] Preop Guidelines    Revised Cardiac Risk Index

## 2019-07-24 DIAGNOSIS — J44.9 COPD, MILD (H): ICD-10-CM

## 2019-07-24 NOTE — TELEPHONE ENCOUNTER
"Requested Prescriptions   Pending Prescriptions Disp Refills     INCRUSE ELLIPTA 62.5 MCG/INH Inhaler [Pharmacy Med Name: INCRUSE ELLIPTA 62.5 MCG INH]  Last Written Prescription Date:  01/28/19  Last Fill Quantity: 3,  # refills: 1   Last Office Visit with [facility], [facility] or [facility] prescribing provider:  06/17/19- Vocal  Future Office Visit:     1     Sig: TAKE 1 PUFF BY MOUTH EVERY DAY       Asthma Maintenance Inhalers - Anticholinergics Passed - 7/24/2019  1:25 AM        Passed - Patient is age 12 years or older        Passed - Recent (12 mo) or future (30 days) visit within the authorizing provider's specialty     Patient had office visit in the last 12 months or has a visit in the next 30 days with authorizing provider or within the authorizing provider's specialty.  See \"Patient Info\" tab in inbasket, or \"Choose Columns\" in Meds & Orders section of the refill encounter.              Passed - Medication is active on med list          "

## 2019-07-29 ENCOUNTER — TRANSFERRED RECORDS (OUTPATIENT)
Dept: HEALTH INFORMATION MANAGEMENT | Facility: CLINIC | Age: 69
End: 2019-07-29

## 2019-07-29 DIAGNOSIS — G47.00 PERSISTENT INSOMNIA: ICD-10-CM

## 2019-07-29 NOTE — TELEPHONE ENCOUNTER
Routing refill request to provider for review/approval because:  Drug not on the [facility] refill protocol   Shawna Becerra RN

## 2019-07-30 RX ORDER — ZOLPIDEM TARTRATE 5 MG/1
5 TABLET ORAL
Qty: 30 TABLET | Refills: 5 | Status: SHIPPED | OUTPATIENT
Start: 2019-07-30 | End: 2020-02-07

## 2019-07-31 ENCOUNTER — TRANSFERRED RECORDS (OUTPATIENT)
Dept: HEALTH INFORMATION MANAGEMENT | Facility: CLINIC | Age: 69
End: 2019-07-31

## 2019-07-31 NOTE — TELEPHONE ENCOUNTER
Signed Prescriptions:                        Disp   Refills    zolpidem (AMBIEN) 5 MG tablet              30 tab*5        Sig: Take 1 tablet (5 mg) by mouth nightly as needed for           sleep  Authorizing Provider: ERIK BLANTON,  [facility]    Written rx faxed to the pharmacy, Pharmacy will contact pt when medication is ready for pickup.  Osmani Nugent,  [facility]

## 2019-11-01 ENCOUNTER — TRANSFERRED RECORDS (OUTPATIENT)
Dept: HEALTH INFORMATION MANAGEMENT | Facility: CLINIC | Age: 69
End: 2019-11-01

## 2019-12-12 DIAGNOSIS — I10 ESSENTIAL HYPERTENSION WITH GOAL BLOOD PRESSURE LESS THAN 140/90: ICD-10-CM

## 2019-12-12 NOTE — TELEPHONE ENCOUNTER
"Requested Prescriptions   Pending Prescriptions Disp Refills     losartan (COZAAR) 50 MG tablet [Pharmacy Med Name: LOSARTAN POTASSIUM 50 MG TAB]  Last Written Prescription Date:  09/20/19  Last Fill Quantity: 90,  # refills: 0   Last Office Visit with [facility], [facility] or [facility] prescribing provider:  06/17/19-Vocal   Future Office Visit:    90 tablet 0     Sig: TAKE 1 TABLET (50 MG) BY MOUTH DAILY       Angiotensin-II Receptors Passed - 12/12/2019  4:47 AM        Passed - Last blood pressure under 140/90 in past 12 months     BP Readings from Last 3 Encounters:   06/17/19 126/59   05/31/19 114/58   04/29/19 115/69                 Passed - Recent (12 mo) or future (30 days) visit within the authorizing provider's specialty     Patient has had an office visit with the authorizing provider or a provider within the authorizing providers department within the previous 12 mos or has a future within next 30 days. See \"Patient Info\" tab in inbasket, or \"Choose Columns\" in Meds & Orders section of the refill encounter.              Passed - Medication is active on med list        Passed - Patient is age 18 or older        Passed - Normal serum creatinine on file in past 12 months     Recent Labs   Lab Test 06/17/19  1314   CR 0.99             Passed - Normal serum potassium on file in past 12 months     Recent Labs   Lab Test 06/17/19  1314   POTASSIUM 4.1                      "

## 2019-12-13 RX ORDER — LOSARTAN POTASSIUM 50 MG/1
50 TABLET ORAL DAILY
Qty: 90 TABLET | Refills: 0 | Status: SHIPPED | OUTPATIENT
Start: 2019-12-13 | End: 2020-03-11

## 2020-01-01 ENCOUNTER — TRANSFERRED RECORDS (OUTPATIENT)
Dept: HEALTH INFORMATION MANAGEMENT | Facility: CLINIC | Age: 70
End: 2020-01-01

## 2020-01-01 ENCOUNTER — OFFICE VISIT (OUTPATIENT)
Dept: FAMILY MEDICINE | Facility: CLINIC | Age: 70
End: 2020-01-01
Payer: MEDICARE

## 2020-01-01 ENCOUNTER — TELEPHONE (OUTPATIENT)
Dept: PHARMACY | Facility: CLINIC | Age: 70
End: 2020-01-01

## 2020-01-01 ENCOUNTER — ALLIED HEALTH/NURSE VISIT (OUTPATIENT)
Dept: PHARMACY | Facility: CLINIC | Age: 70
End: 2020-01-01
Payer: COMMERCIAL

## 2020-01-01 ENCOUNTER — VIRTUAL VISIT (OUTPATIENT)
Dept: FAMILY MEDICINE | Facility: CLINIC | Age: 70
End: 2020-01-01
Payer: MEDICARE

## 2020-01-01 VITALS
BODY MASS INDEX: 24.91 KG/M2 | WEIGHT: 174 LBS | HEIGHT: 70 IN | TEMPERATURE: 98.2 F | OXYGEN SATURATION: 98 % | DIASTOLIC BLOOD PRESSURE: 63 MMHG | SYSTOLIC BLOOD PRESSURE: 106 MMHG | HEART RATE: 80 BPM

## 2020-01-01 DIAGNOSIS — I25.9 CHRONIC ISCHEMIC HEART DISEASE: ICD-10-CM

## 2020-01-01 DIAGNOSIS — I10 ESSENTIAL HYPERTENSION WITH GOAL BLOOD PRESSURE LESS THAN 140/90: ICD-10-CM

## 2020-01-01 DIAGNOSIS — J44.9 COPD, MILD (H): ICD-10-CM

## 2020-01-01 DIAGNOSIS — I73.9 PERIPHERAL VASCULAR DISEASE (H): ICD-10-CM

## 2020-01-01 DIAGNOSIS — G47.00 PERSISTENT INSOMNIA: ICD-10-CM

## 2020-01-01 DIAGNOSIS — I25.5 ISCHEMIC CARDIOMYOPATHY: ICD-10-CM

## 2020-01-01 DIAGNOSIS — I48.0 PAROXYSMAL ATRIAL FIBRILLATION (H): ICD-10-CM

## 2020-01-01 DIAGNOSIS — E78.5 HYPERLIPIDEMIA LDL GOAL <70: ICD-10-CM

## 2020-01-01 DIAGNOSIS — I25.708 CORONARY ARTERY DISEASE OF BYPASS GRAFT OF NATIVE HEART WITH STABLE ANGINA PECTORIS (H): ICD-10-CM

## 2020-01-01 DIAGNOSIS — I10 ESSENTIAL HYPERTENSION: Primary | ICD-10-CM

## 2020-01-01 DIAGNOSIS — I25.118 CORONARY ARTERY DISEASE OF NATIVE HEART WITH STABLE ANGINA PECTORIS, UNSPECIFIED VESSEL OR LESION TYPE (H): ICD-10-CM

## 2020-01-01 DIAGNOSIS — E78.5 HYPERLIPIDEMIA LDL GOAL <70: Primary | ICD-10-CM

## 2020-01-01 DIAGNOSIS — G47.00 INSOMNIA, UNSPECIFIED TYPE: ICD-10-CM

## 2020-01-01 DIAGNOSIS — I21.4: Primary | ICD-10-CM

## 2020-01-01 LAB
ALBUMIN SERPL-MCNC: 3.5 G/DL (ref 3.4–5)
ALP SERPL-CCNC: 90 U/L (ref 40–150)
ALT SERPL W P-5'-P-CCNC: 20 U/L (ref 0–70)
ANION GAP SERPL CALCULATED.3IONS-SCNC: 4 MMOL/L (ref 3–14)
ANION GAP SERPL CALCULATED.3IONS-SCNC: 5 MMOL/L (ref 3–14)
AST SERPL W P-5'-P-CCNC: 12 U/L (ref 0–45)
BASOPHILS # BLD AUTO: 0.1 10E9/L (ref 0–0.2)
BASOPHILS NFR BLD AUTO: 0.7 %
BILIRUB SERPL-MCNC: 1.1 MG/DL (ref 0.2–1.3)
BUN SERPL-MCNC: 23 MG/DL (ref 7–30)
BUN SERPL-MCNC: 24 MG/DL (ref 7–30)
CALCIUM SERPL-MCNC: 8.5 MG/DL (ref 8.5–10.1)
CALCIUM SERPL-MCNC: 8.9 MG/DL (ref 8.5–10.1)
CHLORIDE SERPL-SCNC: 107 MMOL/L (ref 94–109)
CHLORIDE SERPL-SCNC: 107 MMOL/L (ref 94–109)
CHOLEST SERPL-MCNC: 95 MG/DL
CO2 SERPL-SCNC: 24 MMOL/L (ref 20–32)
CO2 SERPL-SCNC: 28 MMOL/L (ref 20–32)
CREAT SERPL-MCNC: 0.99 MG/DL (ref 0.66–1.25)
CREAT SERPL-MCNC: 1 MG/DL (ref 0.66–1.25)
DIFFERENTIAL METHOD BLD: ABNORMAL
EOSINOPHIL # BLD AUTO: 0.3 10E9/L (ref 0–0.7)
EOSINOPHIL NFR BLD AUTO: 4.2 %
ERYTHROCYTE [DISTWIDTH] IN BLOOD BY AUTOMATED COUNT: 14 % (ref 10–15)
GFR SERPL CREATININE-BSD FRML MDRD: 75 ML/MIN/{1.73_M2}
GFR SERPL CREATININE-BSD FRML MDRD: 76 ML/MIN/{1.73_M2}
GLUCOSE SERPL-MCNC: 132 MG/DL (ref 70–99)
GLUCOSE SERPL-MCNC: 98 MG/DL (ref 70–99)
HCT VFR BLD AUTO: 40.5 % (ref 40–53)
HDLC SERPL-MCNC: 31 MG/DL
HGB BLD-MCNC: 12.8 G/DL (ref 13.3–17.7)
LDLC SERPL CALC-MCNC: 51 MG/DL
LYMPHOCYTES # BLD AUTO: 1 10E9/L (ref 0.8–5.3)
LYMPHOCYTES NFR BLD AUTO: 12.3 %
MAGNESIUM SERPL-MCNC: 2.2 MG/DL (ref 1.6–2.3)
MCH RBC QN AUTO: 28.1 PG (ref 26.5–33)
MCHC RBC AUTO-ENTMCNC: 31.6 G/DL (ref 31.5–36.5)
MCV RBC AUTO: 89 FL (ref 78–100)
MONOCYTES # BLD AUTO: 1 10E9/L (ref 0–1.3)
MONOCYTES NFR BLD AUTO: 11.8 %
NEUTROPHILS # BLD AUTO: 5.8 10E9/L (ref 1.6–8.3)
NEUTROPHILS NFR BLD AUTO: 71 %
NONHDLC SERPL-MCNC: 64 MG/DL
NT-PROBNP SERPL-MCNC: 4261 PG/ML (ref 0–125)
PLATELET # BLD AUTO: 273 10E9/L (ref 150–450)
POTASSIUM SERPL-SCNC: 4.2 MMOL/L (ref 3.4–5.3)
POTASSIUM SERPL-SCNC: 4.8 MMOL/L (ref 3.4–5.3)
PROT SERPL-MCNC: 6.9 G/DL (ref 6.8–8.8)
RBC # BLD AUTO: 4.55 10E12/L (ref 4.4–5.9)
SODIUM SERPL-SCNC: 136 MMOL/L (ref 133–144)
SODIUM SERPL-SCNC: 139 MMOL/L (ref 133–144)
TRIGL SERPL-MCNC: 67 MG/DL
WBC # BLD AUTO: 8.2 10E9/L (ref 4–11)

## 2020-01-01 PROCEDURE — 99607 MTMS BY PHARM ADDL 15 MIN: CPT | Performed by: PHARMACIST

## 2020-01-01 PROCEDURE — 85025 COMPLETE CBC W/AUTO DIFF WBC: CPT | Performed by: INTERNAL MEDICINE

## 2020-01-01 PROCEDURE — 80048 BASIC METABOLIC PNL TOTAL CA: CPT | Performed by: FAMILY MEDICINE

## 2020-01-01 PROCEDURE — 99214 OFFICE O/P EST MOD 30 MIN: CPT | Performed by: INTERNAL MEDICINE

## 2020-01-01 PROCEDURE — 36415 COLL VENOUS BLD VENIPUNCTURE: CPT | Performed by: FAMILY MEDICINE

## 2020-01-01 PROCEDURE — 83880 ASSAY OF NATRIURETIC PEPTIDE: CPT | Performed by: INTERNAL MEDICINE

## 2020-01-01 PROCEDURE — G2012 BRIEF CHECK IN BY MD/QHP: HCPCS | Performed by: FAMILY MEDICINE

## 2020-01-01 PROCEDURE — 36415 COLL VENOUS BLD VENIPUNCTURE: CPT | Performed by: INTERNAL MEDICINE

## 2020-01-01 PROCEDURE — 99605 MTMS BY PHARM NP 15 MIN: CPT | Performed by: PHARMACIST

## 2020-01-01 PROCEDURE — 80061 LIPID PANEL: CPT | Performed by: INTERNAL MEDICINE

## 2020-01-01 PROCEDURE — 83735 ASSAY OF MAGNESIUM: CPT | Performed by: INTERNAL MEDICINE

## 2020-01-01 PROCEDURE — 80053 COMPREHEN METABOLIC PANEL: CPT | Performed by: INTERNAL MEDICINE

## 2020-01-01 RX ORDER — NITROGLYCERIN 0.4 MG/1
TABLET SUBLINGUAL
Qty: 25 TABLET | Refills: 11 | Status: SHIPPED | OUTPATIENT
Start: 2020-01-01

## 2020-01-01 RX ORDER — ZOLPIDEM TARTRATE 5 MG/1
TABLET ORAL
Qty: 30 TABLET | Refills: 5 | Status: SHIPPED | OUTPATIENT
Start: 2020-01-01 | End: 2021-01-01

## 2020-01-01 RX ORDER — SACUBITRIL AND VALSARTAN 24; 26 MG/1; MG/1
TABLET, FILM COATED ORAL
Qty: 60 TABLET | Refills: 5 | Status: SHIPPED | OUTPATIENT
Start: 2020-01-01 | End: 2020-01-01

## 2020-01-01 RX ORDER — SACUBITRIL AND VALSARTAN 24; 26 MG/1; MG/1
TABLET, FILM COATED ORAL
Qty: 60 TABLET | Refills: 11 | Status: SHIPPED | OUTPATIENT
Start: 2020-01-01

## 2020-01-01 RX ORDER — CARVEDILOL 6.25 MG/1
TABLET ORAL
Qty: 180 TABLET | Refills: 2 | Status: SHIPPED | OUTPATIENT
Start: 2020-01-01 | End: 2020-01-01

## 2020-01-01 RX ORDER — SACUBITRIL AND VALSARTAN 24; 26 MG/1; MG/1
TABLET, FILM COATED ORAL
Qty: 60 TABLET | Refills: 0 | Status: SHIPPED | OUTPATIENT
Start: 2020-01-01 | End: 2020-01-01

## 2020-01-01 RX ORDER — ISOSORBIDE MONONITRATE 60 MG/1
60 TABLET, EXTENDED RELEASE ORAL EVERY MORNING
Qty: 90 TABLET | Refills: 3 | Status: SHIPPED | OUTPATIENT
Start: 2020-01-01

## 2020-01-01 RX ORDER — ATORVASTATIN CALCIUM 80 MG/1
TABLET, FILM COATED ORAL
Qty: 90 TABLET | Refills: 3 | Status: SHIPPED | OUTPATIENT
Start: 2020-01-01

## 2020-01-01 RX ORDER — FUROSEMIDE 20 MG
20 TABLET ORAL DAILY
Qty: 90 TABLET | Refills: 3 | Status: SHIPPED | OUTPATIENT
Start: 2020-01-01

## 2020-01-01 RX ORDER — ALBUTEROL SULFATE 90 UG/1
AEROSOL, METERED RESPIRATORY (INHALATION)
Qty: 18 INHALER | Refills: 3 | Status: SHIPPED | OUTPATIENT
Start: 2020-01-01 | End: 2021-01-01

## 2020-01-01 RX ORDER — CARVEDILOL 6.25 MG/1
6.25 TABLET ORAL 2 TIMES DAILY WITH MEALS
Qty: 180 TABLET | Refills: 3 | Status: SHIPPED | OUTPATIENT
Start: 2020-01-01

## 2020-01-01 RX ORDER — CLOPIDOGREL BISULFATE 75 MG/1
75 TABLET ORAL DAILY
Qty: 90 TABLET | Refills: 3 | Status: SHIPPED | OUTPATIENT
Start: 2020-01-01

## 2020-01-01 ASSESSMENT — MIFFLIN-ST. JEOR: SCORE: 1552.97

## 2020-01-19 DIAGNOSIS — I10 ESSENTIAL HYPERTENSION WITH GOAL BLOOD PRESSURE LESS THAN 140/90: ICD-10-CM

## 2020-01-19 DIAGNOSIS — J44.9 COPD, MILD (H): ICD-10-CM

## 2020-01-19 NOTE — TELEPHONE ENCOUNTER
"Requested Prescriptions   Pending Prescriptions Disp Refills     INCRUSE ELLIPTA 62.5 MCG/INH Inhaler [Pharmacy Med Name: INCRUSE ELLIPTA 62.5 MCG INH]  1     Sig: INHALE 1 PUFF BY MOUTH EVERY DAY           Last Written Prescription Date:  7/24/19  Last Fill Quantity: 90,  # refills: 1   Last Office Visit with [facility], [facility] or [facility] prescribing provider:  6/17/19   Future Office Visit:         Asthma Maintenance Inhalers - Anticholinergics Passed - 1/19/2020  1:15 AM        Passed - Patient is age 12 years or older        Passed - Recent (12 mo) or future (30 days) visit within the authorizing provider's specialty     Patient has had an office visit with the authorizing provider or a provider within the authorizing providers department within the previous 12 mos or has a future within next 30 days. See \"Patient Info\" tab in inbasket, or \"Choose Columns\" in Meds & Orders section of the refill encounter.              Passed - Medication is active on med list        furosemide (LASIX) 20 MG tablet [Pharmacy Med Name: FUROSEMIDE 20 MG TABLET] 90 tablet 3     Sig: TAKE 1 TABLET (20 MG) BY MOUTH DAILY         Last Written Prescription Date:  1/24/19  Last Fill Quantity: 90,  # refills: 3   Last Office Visit with [facility], [facility] or [facility] prescribing provider:  6/17/19   Future Office Visit:         Diuretics (Including Combos) Protocol Passed - 1/19/2020  1:15 AM        Passed - Blood pressure under 140/90 in past 12 months     BP Readings from Last 3 Encounters:   06/17/19 126/59   05/31/19 114/58   04/29/19 115/69                 Passed - Recent (12 mo) or future (30 days) visit within the authorizing provider's specialty     Patient has had an office visit with the authorizing provider or a provider within the authorizing providers department within the previous 12 mos or has a future within next 30 days. See \"Patient Info\" tab in inFAD ? IOet, or \"Choose Columns\" in Meds & Orders section of the refill encounter.              " Passed - Medication is active on med list        Passed - Patient is age 18 or older        Passed - Normal serum creatinine on file in past 12 months     Recent Labs   Lab Test 06/17/19  1314   CR 0.99              Passed - Normal serum potassium on file in past 12 months     Recent Labs   Lab Test 06/17/19  1314   POTASSIUM 4.1                    Passed - Normal serum sodium on file in past 12 months     Recent Labs   Lab Test 06/17/19  1314                       Francisco Faarax  [facility]

## 2020-01-20 RX ORDER — FUROSEMIDE 20 MG
20 TABLET ORAL DAILY
Qty: 90 TABLET | Refills: 3 | Status: SHIPPED | OUTPATIENT
Start: 2020-01-20 | End: 2020-01-01

## 2020-01-20 NOTE — TELEPHONE ENCOUNTER
Prescription approved per [facility] Refill Protocol.  Felicia Jha RN on 1/20/2020 at 4:39 PM

## 2020-02-05 ENCOUNTER — OFFICE VISIT (OUTPATIENT)
Dept: FAMILY MEDICINE | Facility: CLINIC | Age: 70
End: 2020-02-05
Payer: MEDICARE

## 2020-02-05 VITALS
RESPIRATION RATE: 16 BRPM | DIASTOLIC BLOOD PRESSURE: 70 MMHG | OXYGEN SATURATION: 96 % | WEIGHT: 187 LBS | BODY MASS INDEX: 26.77 KG/M2 | SYSTOLIC BLOOD PRESSURE: 134 MMHG | HEIGHT: 70 IN | HEART RATE: 90 BPM | TEMPERATURE: 98 F

## 2020-02-05 DIAGNOSIS — J44.9 CHRONIC OBSTRUCTIVE PULMONARY DISEASE, UNSPECIFIED COPD TYPE (H): ICD-10-CM

## 2020-02-05 DIAGNOSIS — Z00.00 ENCOUNTER FOR MEDICARE ANNUAL WELLNESS EXAM: Primary | ICD-10-CM

## 2020-02-05 DIAGNOSIS — I25.9 CHRONIC ISCHEMIC HEART DISEASE: ICD-10-CM

## 2020-02-05 DIAGNOSIS — E78.5 HYPERLIPIDEMIA LDL GOAL <70: ICD-10-CM

## 2020-02-05 DIAGNOSIS — I10 HYPERTENSION GOAL BP (BLOOD PRESSURE) < 130/80: ICD-10-CM

## 2020-02-05 LAB
ALBUMIN SERPL-MCNC: 3.9 G/DL (ref 3.4–5)
ALP SERPL-CCNC: 83 U/L (ref 40–150)
ALT SERPL W P-5'-P-CCNC: 16 U/L (ref 0–70)
ANION GAP SERPL CALCULATED.3IONS-SCNC: 4 MMOL/L (ref 3–14)
AST SERPL W P-5'-P-CCNC: 13 U/L (ref 0–45)
BASOPHILS # BLD AUTO: 0 10E9/L (ref 0–0.2)
BASOPHILS NFR BLD AUTO: 0.3 %
BILIRUB SERPL-MCNC: 1 MG/DL (ref 0.2–1.3)
BUN SERPL-MCNC: 20 MG/DL (ref 7–30)
CALCIUM SERPL-MCNC: 9.4 MG/DL (ref 8.5–10.1)
CHLORIDE SERPL-SCNC: 106 MMOL/L (ref 94–109)
CHOLEST SERPL-MCNC: 126 MG/DL
CO2 SERPL-SCNC: 28 MMOL/L (ref 20–32)
CREAT SERPL-MCNC: 1.1 MG/DL (ref 0.66–1.25)
CREAT UR-MCNC: 19 MG/DL
DIFFERENTIAL METHOD BLD: NORMAL
EOSINOPHIL # BLD AUTO: 0.4 10E9/L (ref 0–0.7)
EOSINOPHIL NFR BLD AUTO: 3.9 %
ERYTHROCYTE [DISTWIDTH] IN BLOOD BY AUTOMATED COUNT: 13.9 % (ref 10–15)
GFR SERPL CREATININE-BSD FRML MDRD: 67 ML/MIN/{1.73_M2}
GLUCOSE SERPL-MCNC: 107 MG/DL (ref 70–99)
HCT VFR BLD AUTO: 45.1 % (ref 40–53)
HDLC SERPL-MCNC: 40 MG/DL
HGB BLD-MCNC: 14.8 G/DL (ref 13.3–17.7)
LDLC SERPL CALC-MCNC: 72 MG/DL
LYMPHOCYTES # BLD AUTO: 1.3 10E9/L (ref 0.8–5.3)
LYMPHOCYTES NFR BLD AUTO: 14.1 %
MCH RBC QN AUTO: 29.9 PG (ref 26.5–33)
MCHC RBC AUTO-ENTMCNC: 32.8 G/DL (ref 31.5–36.5)
MCV RBC AUTO: 91 FL (ref 78–100)
MICROALBUMIN UR-MCNC: <5 MG/L
MICROALBUMIN/CREAT UR: NORMAL MG/G CR (ref 0–17)
MONOCYTES # BLD AUTO: 1.1 10E9/L (ref 0–1.3)
MONOCYTES NFR BLD AUTO: 12.3 %
NEUTROPHILS # BLD AUTO: 6.3 10E9/L (ref 1.6–8.3)
NEUTROPHILS NFR BLD AUTO: 69.4 %
NONHDLC SERPL-MCNC: 86 MG/DL
PLATELET # BLD AUTO: 239 10E9/L (ref 150–450)
POTASSIUM SERPL-SCNC: 5 MMOL/L (ref 3.4–5.3)
PROT SERPL-MCNC: 7.7 G/DL (ref 6.8–8.8)
RBC # BLD AUTO: 4.95 10E12/L (ref 4.4–5.9)
SODIUM SERPL-SCNC: 138 MMOL/L (ref 133–144)
TRIGL SERPL-MCNC: 71 MG/DL
TROPONIN I SERPL-MCNC: <0.015 UG/L (ref 0–0.04)
WBC # BLD AUTO: 9.1 10E9/L (ref 4–11)

## 2020-02-05 PROCEDURE — 36415 COLL VENOUS BLD VENIPUNCTURE: CPT | Performed by: INTERNAL MEDICINE

## 2020-02-05 PROCEDURE — 93000 ELECTROCARDIOGRAM COMPLETE: CPT | Performed by: INTERNAL MEDICINE

## 2020-02-05 PROCEDURE — 85025 COMPLETE CBC W/AUTO DIFF WBC: CPT | Performed by: INTERNAL MEDICINE

## 2020-02-05 PROCEDURE — G0438 PPPS, INITIAL VISIT: HCPCS | Performed by: INTERNAL MEDICINE

## 2020-02-05 PROCEDURE — 80061 LIPID PANEL: CPT | Performed by: INTERNAL MEDICINE

## 2020-02-05 PROCEDURE — 80053 COMPREHEN METABOLIC PANEL: CPT | Performed by: INTERNAL MEDICINE

## 2020-02-05 PROCEDURE — 82043 UR ALBUMIN QUANTITATIVE: CPT | Performed by: INTERNAL MEDICINE

## 2020-02-05 PROCEDURE — 84484 ASSAY OF TROPONIN QUANT: CPT | Performed by: INTERNAL MEDICINE

## 2020-02-05 PROCEDURE — 99214 OFFICE O/P EST MOD 30 MIN: CPT | Mod: 25 | Performed by: INTERNAL MEDICINE

## 2020-02-05 RX ORDER — NITROGLYCERIN 0.4 MG/1
TABLET SUBLINGUAL
Qty: 25 TABLET | Refills: 11 | Status: SHIPPED | OUTPATIENT
Start: 2020-02-05 | End: 2020-01-01

## 2020-02-05 RX ORDER — ISOSORBIDE MONONITRATE 30 MG/1
30 TABLET, EXTENDED RELEASE ORAL EVERY MORNING
Qty: 30 TABLET | Refills: 11 | Status: SHIPPED | OUTPATIENT
Start: 2020-02-05 | End: 2020-01-01

## 2020-02-05 ASSESSMENT — PAIN SCALES - GENERAL: PAINLEVEL: NO PAIN (0)

## 2020-02-05 ASSESSMENT — MIFFLIN-ST. JEOR: SCORE: 1616.97

## 2020-02-05 NOTE — PROGRESS NOTES
"  SUBJECTIVE:   Joseph Cardona is a 69 year old male who presents for Preventive Visit.    Are you in the first 12 months of your Medicare Part B coverage?  No    Physical Health:    In general, how would you rate your overall physical health? good    Outside of work, how many days during the week do you exercise? 2-3 days/week    Outside of work, approximately how many minutes a day do you exercise?less than 15 minutes    If you drink alcohol do you typically have >3 drinks per day or >7 drinks per week? No    Do you usually eat at least 4 servings of fruit and vegetables a day, include whole grains & fiber and avoid regularly eating high fat or \"junk\" foods? NO    Do you have any problems taking medications regularly?  No    Do you have any side effects from medications? none    Needs assistance for the following daily activities: no assistance needed    Which of the following safety concerns are present in your home?  none identified     Hearing impairment: No    In the past 6 months, have you been bothered by leaking of urine? no    Mental Health:    In general, how would you rate your overall mental or emotional health? good  PHQ-2 Score:      Do you feel safe in your environment? Yes    Have you ever done Advance Care Planning? (For example, a Health Directive, POLST, or a discussion with a medical provider or your loved ones about your wishes): No, advance care planning information given to patient to review.  Advanced care planning was discussed at today's visit.    Additional concerns to address?  No    Fall risk:  Fallen 2 or more times in the past year?: No  Any fall with injury in the past year?: No    Cognitive Screenin) Repeat 3 items (Leader, Season, Table)    2) Clock draw: NORMAL  3) 3 item recall: Recalls 3 objects  Results: NORMAL clock, 1-2 items recalled: COGNITIVE IMPAIRMENT LESS LIKELY    Mini-CogTM Copyright YANETH Evans. Licensed by the author for use in [facility]; " reprinted with permission (radha@.[email or web address]). All rights reserved.      Do you have sleep apnea, excessive snoring or daytime drowsiness?: yes - patient has cpap machine but states that he doesn't use it.      CHRONIC CONDITIONS    1) Coronary artery disease/Vascular Disease Follow-up      How often do you take nitroglycerin? Occasionally    Do you take an aspirin every day? Yes and Clopidogrel.    Precipitating factor: Shoveling snow.     Complications: No dizziness, diaphoresis nor dyspnea.    Beta-blockers: yes.    Other symptoms: No claudication.    2) COPD Follow-Up    Overall, how are your COPD symptoms since your last clinic visit?  Better    How much fatigue or shortness of breath do you have when you are walking?  None    How much shortness of breath do you have when you are resting?  None    How often do you cough? Rarely    Have you noticed any change in your sputum/phlegm?  No    Have you experienced a recent fever? No    Please describe how far you can walk without stopping to rest:  Less than 1 block    How many flights of stairs are you able to walk up without stopping?  3 or more    Have you had any Emergency Room Visits, Urgent Care Visits, or Hospital Admissions because of your COPD since your last office visit?  No    History   Smoking Status     Former Smoker   Smokeless Tobacco     Never Used     Comment: quit 2 years ago         Reviewed and updated as needed this visit by clinical staff         Reviewed and updated as needed this visit by Provider        Social History     Tobacco Use     Smoking status: Former Smoker     Smokeless tobacco: Never Used     Tobacco comment: quit 2 years ago   Substance Use Topics     Alcohol use: Yes     Alcohol/week: 0.0 standard drinks                           Current providers sharing in care for this patient include:   Patient Care Team:  Kyle López MD as PCP - General (Internal Medicine)  Kyle López MD as Assigned PCP  Kyle López  MD Filemon as MD (Internal Medicine)  Bo Hampton MD as Resident (Radiology)  Chinyere Carter [facility] as Pharmacist    The following health maintenance items are reviewed in Epic and correct as of today:  Health Maintenance   Topic Date Due     ZOSTER IMMUNIZATION (1 of 2) 02/16/1998     MEDICARE ANNUAL WELLNESS VISIT  11/08/2017     FALL RISK ASSESSMENT  05/01/2019     PHQ-2  01/01/2020     BMP  06/17/2020     ADVANCE CARE PLANNING  11/17/2020     LIPID  01/09/2024     DTAP/TDAP/TD IMMUNIZATION (3 - Td) 11/17/2025     COLONOSCOPY  02/09/2026     SPIROMETRY  Completed     HEPATITIS C SCREENING  Completed     COPD ACTION PLAN  Completed     INFLUENZA VACCINE  Completed     PNEUMOCOCCAL IMMUNIZATION 65+ LOW/MEDIUM RISK  Completed     AORTIC ANEURYSM SCREENING (SYSTEM ASSIGNED)  Completed     IPV IMMUNIZATION  Aged Out     MENINGITIS IMMUNIZATION  Aged Out     Labs reviewed in EPIC  BP Readings from Last 3 Encounters:   02/05/20 134/70   06/17/19 126/59   05/31/19 114/58    Wt Readings from Last 3 Encounters:   02/05/20 84.8 kg (187 lb)   06/17/19 77.1 kg (170 lb)   04/29/19 74.9 kg (165 lb 3.2 oz)                  Patient Active Problem List   Diagnosis     Hyperlipidemia LDL goal <70     Essential hypertension     Advanced directives, counseling/discussion     COPD, mild (H)     Coronary artery disease involving native coronary artery of native heart without angina pectoris     Positive hepatitis C antibody test     Paroxysmal atrial fibrillation (H)     Intermittent claudication (H)     Peripheral vascular disease (H)     S/P insertion of iliac artery stent     S/P left femoral endarterectomy     Coronary artery disease of native heart with stable angina pectoris, unspecified vessel or lesion type (H)     Past Surgical History:   Procedure Laterality Date     CARDIAC SURGERY      Bypass double and quadruple     EYE SURGERY      cataract surgery     HEAD & NECK SURGERY      Caratid surgery     VASCULAR SURGERY    "      Social History     Tobacco Use     Smoking status: Former Smoker     Smokeless tobacco: Never Used     Tobacco comment: quit 2 years ago   Substance Use Topics     Alcohol use: Yes     Alcohol/week: 0.0 standard drinks     History reviewed. No pertinent family history.      Allergies   Allergen Reactions     Lisinopril Cough     Recent Labs   Lab Test 06/17/19  1314 01/09/19  0856 06/01/18  1157 04/25/18  1118 02/02/18  1130 06/26/17  1154  11/17/15  1212   A1C  --  5.8*  --  5.7*  --  5.6   < >  --    LDL  --  64  --   --  66 58   < >  --    HDL  --  33*  --   --  39* 36*   < >  --    TRIG  --  65  --   --  72 77   < >  --    ALT 17 12 14  --  17 15   < >  --    CR 0.99 1.04 1.04  --  1.13 1.03   < >  --    GFRESTIMATED 76 72 71  --  64 72   < >  --    GFRESTBLACK 88 83 85  --  78 87   < >  --    POTASSIUM 4.1 4.5 4.1  --  4.5 4.6   < >  --    TSH  --   --   --  2.60  --   --   --  4.71*    < > = values in this interval not displayed.          ROS:  CONSTITUTIONAL: NEGATIVE for fever, chills, change in weight  INTEGUMENTARY/SKIN: NEGATIVE for worrisome rashes, moles or lesions  EYES: NEGATIVE for vision changes or irritation  ENT/MOUTH: NEGATIVE for ear, mouth and throat problems  RESP: NEGATIVE for significant cough or SOB  CV: NEGATIVE for chest pain, palpitations or peripheral edema  GI: NEGATIVE for nausea, abdominal pain, heartburn, or change in bowel habits  : NEGATIVE for frequency, dysuria, or hematuria  MUSCULOSKELETAL: NEGATIVE for significant arthralgias or myalgia  NEURO: NEGATIVE for weakness, dizziness or paresthesias  ENDOCRINE: NEGATIVE for temperature intolerance, skin/hair changes  HEME: NEGATIVE for bleeding problems  PSYCHIATRIC: NEGATIVE for changes in mood or affect    OBJECTIVE:   /70   Pulse 90   Temp 98  F (36.7  C) (Oral)   Resp 16   Ht 1.79 m (5' 10.47\")   Wt 84.8 kg (187 lb)   SpO2 96%   BMI 26.47 kg/m     Estimated body mass index is 24.39 kg/m  as calculated from " "the following:    Height as of 6/17/19: 1.778 m (5' 10\").    Weight as of 6/17/19: 77.1 kg (170 lb).  EXAM:   GENERAL: healthy, alert and no distress  EYES: Eyes grossly normal to inspection, PERRL and conjunctivae and sclerae normal  HENT: ear canals and TM's normal, nose and mouth without ulcers or lesions  NECK: no adenopathy, no asymmetry, masses, or scars and thyroid normal to palpation  RESP: lungs clear to auscultation - no rales, rhonchi or wheezes  CV: regular rate and rhythm, normal S1 S2, no S3 or S4, no murmur, click or rub, no peripheral edema and peripheral pulses strong  ABDOMEN: soft, nontender, no hepatosplenomegaly, no masses and bowel sounds normal  MS: no gross musculoskeletal defects noted, no edema  SKIN: no suspicious lesions or rashes  NEURO: Normal strength and tone, mentation intact and speech normal  PSYCH: mentation appears normal, affect normal/bright    Diagnostic Test Results:  EXAM:   1. RESTING ANKLE-BRACHIAL INDICES (ABIs)   2. DUPLEX ARTERIAL ULTRASOUND OF THE LOWER EXTREMITIES BILATERALLY  LOCATION: [facility]  DATE: 1/21/2020    INDICATION: Peripheral artery disease. History of bilateral iliac stents and  left SFA stent.  COMPARISON: 07/29/2019  Impression:    1.  No sonographic evidence of inflow disease. Iliac stents appear widely  patent.    2.  On the right, there is evidence of mild stenosis at the common femoral  level, and moderate stenoses involving the profunda femoral origin and mid SFA.  Waveforms do remain multiphasic through the pedal level. PIOTR mildly reduced at  0.88.    3.  On the left, there is evidence of mild stenosis at the common femoral level.  Long segment SFA stent widely patent with multiphasic flow through the pedal  level. Near-normal PIOTR of 0.95.    ASSESSMENT / PLAN:   1. Encounter for Medicare annual wellness exam      2. Chronic ischemic heart disease    - nitroGLYcerin (NITROSTAT) 0.4 MG sublingual tablet; PLACE 1 TABLET UNDER THE TONGUE EVERY " "5 MINUTES AS NEEDED FOR CHEST PAIN.  Dispense: 25 tablet; Refill: 11  - isosorbide mononitrate (IMDUR) 30 MG 24 hr tablet; Take 1 tablet (30 mg) by mouth every morning  Dispense: 30 tablet; Refill: 11  - EKG 12-lead complete w/read - Clinics  - Lipid panel reflex to direct LDL Non-fasting  - Troponin I  - CBC with platelets and differential    3. Hypertension goal BP (blood pressure) < 130/80    - Comprehensive metabolic panel (BMP + Alb, Alk Phos, ALT, AST, Total. Bili, TP)  - Albumin Random Urine Quantitative with Creat Ratio    4. Hyperlipidemia LDL goal <70    - Lipid panel reflex to direct LDL Non-fasting  - Comprehensive metabolic panel (BMP + Alb, Alk Phos, ALT, AST, Total. Bili, TP)    5. Chronic obstructive pulmonary disease, unspecified COPD type (H)    - CBC with platelets and differential  - Comprehensive metabolic panel (BMP + Alb, Alk Phos, ALT, AST, Total. Bili, TP)    COUNSELING:  Special attention given to:       Regular exercise       Healthy diet/nutrition       The ASCVD Risk score ([place] DC Jr., et al., 2013) failed to calculate for the following reasons:    The valid total cholesterol range is 130 to 320 mg/dL    Estimated body mass index is 24.39 kg/m  as calculated from the following:    Height as of 6/17/19: 1.778 m (5' 10\").    Weight as of 6/17/19: 77.1 kg (170 lb).         reports that he has quit smoking. He has never used smokeless tobacco.      Appropriate preventive services were discussed with this patient, including applicable screening as appropriate for cardiovascular disease, diabetes, osteopenia/osteoporosis, and glaucoma.  As appropriate for age/gender, discussed screening for colorectal cancer, prostate cancer, breast cancer, and cervical cancer. Checklist reviewing preventive services available has been given to the patient.    Reviewed patients plan of care and provided an AVS. The Basic Care Plan (routine screening as documented in Health Maintenance) for Joseph meets the " Care Plan requirement. This Care Plan has been established and reviewed with the Patient.    Counseling Resources:  ATP IV Guidelines  Pooled Cohorts Equation Calculator  Breast Cancer Risk Calculator  FRAX Risk Assessment  ICSI Preventive Guidelines  Dietary Guidelines for Americans, 2010  USDA's MyPlate  ASA Prophylaxis  Lung CA Screening    Kyle López MD  [facility]

## 2020-02-05 NOTE — PATIENT INSTRUCTIONS
Patient Education   Personalized Prevention Plan  You are due for the preventive services outlined below.  Your care team is available to assist you in scheduling these services.  If you have already completed any of these items, please share that information with your care team to update in your medical record.  Health Maintenance Due   Topic Date Due     Zoster (Shingles) Vaccine (1 of 2) 02/16/1998     Annual Wellness Visit  11/08/2017     FALL RISK ASSESSMENT  05/01/2019     PHQ-2  01/01/2020

## 2020-02-05 NOTE — LETTER
February 7, 2020      Joseph Cardona  [address] [address] [id number]        Dear ,    We are writing to inform you of your test results.  Despite anticoagulant use (Eliquis), you are not anemic.  Normal white blood cell count indicates no underlying inflammatory/infectious conditions.  Continue current medications.    If you have any questions or concerns, please call the clinic at the number listed above.     Sincerely,      Kyle López MD/[facility]  Internal Medicine    Resulted Orders   CBC with platelets and differential   Result Value Ref Range    WBC 9.1 4.0 - 11.0 10e9/L    RBC Count 4.95 4.4 - 5.9 10e12/L    Hemoglobin 14.8 13.3 - 17.7 g/dL    Hematocrit 45.1 40.0 - 53.0 %    MCV 91 78 - 100 fl    MCH 29.9 26.5 - 33.0 pg    MCHC 32.8 31.5 - 36.5 g/dL    RDW 13.9 10.0 - 15.0 %    Platelet Count 239 150 - 450 10e9/L    % Neutrophils 69.4 %    % Lymphocytes 14.1 %    % Monocytes 12.3 %    % Eosinophils 3.9 %    % Basophils 0.3 %    Absolute Neutrophil 6.3 1.6 - 8.3 10e9/L    Absolute Lymphocytes 1.3 0.8 - 5.3 10e9/L    Absolute Monocytes 1.1 0.0 - 1.3 10e9/L    Absolute Eosinophils 0.4 0.0 - 0.7 10e9/L    Absolute Basophils 0.0 0.0 - 0.2 10e9/L    Diff Method Automated Method

## 2020-02-06 ENCOUNTER — TELEPHONE (OUTPATIENT)
Dept: FAMILY MEDICINE | Facility: CLINIC | Age: 70
End: 2020-02-06

## 2020-02-06 NOTE — TELEPHONE ENCOUNTER
This writer attempted to contact Joseph on 02/06/20      Reason for call results and left message.      If patient calls back:   Registered Nurse called. Follow Triage Call workflow        Shawna Becerra RN     Notes recorded by Kyle López MD on 2/5/2020 at 1:50 PM CST  Despite anticoagulant use (Eliquis), patient is not anemic.  Normal white blood cell count indicates no underlying inflammatory/infectious conditions.  Continue current medications.    (Call patient)     Notes recorded by Kyle López MD on 2/5/2020 at 9:42 PM CST  Normal urine albumin indicates no early sign of kidney disease despite his coronary artery disease and hypertension.  Lipid panel is normal. Continue Atorvastatin at the same dose.  Kidney and liver function tests are normal.  Despite intermittent chest pain, normal Troponin I indicates no ongoing myocardial ischemia.    Shawna Becerra RN

## 2020-02-07 NOTE — TELEPHONE ENCOUNTER
This writer attempted to contact patient on 02/07/20      Reason for call results and left message.      If patient calls back:   Registered Nurse called. Follow Triage Call workflow        Rayne Carey RN

## 2020-02-07 NOTE — TELEPHONE ENCOUNTER
Patient called back and was informed of normal results on 2/5/2020 labs as written below.    Jacquelyn Finn RN, [facility] Triage

## 2020-02-27 DIAGNOSIS — K21.9 GASTROESOPHAGEAL REFLUX DISEASE WITHOUT ESOPHAGITIS: ICD-10-CM

## 2020-02-27 NOTE — TELEPHONE ENCOUNTER
"Requested Prescriptions   Pending Prescriptions Disp Refills     ranitidine (ZANTAC) 150 MG tablet  Last Written Prescription Date:  01/24/19  Last Fill Quantity: 180,  # refills: 3   Last Office Visit with [facility], P or [facility] prescribing provider:  02/05/2020-Vocal   Future Office Visit:    180 tablet 3     Sig: Take 1 tablet (150 mg) by mouth 2 times daily       H2 Blockers Protocol Passed - 2/27/2020 12:24 PM        Passed - Patient is age 12 or older        Passed - Recent (12 mo) or future (30 days) visit within the authorizing provider's specialty     Patient has had an office visit with the authorizing provider or a provider within the authorizing providers department within the previous 12 mos or has a future within next 30 days. See \"Patient Info\" tab in inbasket, or \"Choose Columns\" in Meds & Orders section of the refill encounter.              Passed - Medication is active on med list          "

## 2020-03-02 NOTE — TELEPHONE ENCOUNTER
Prescription approved per [facility] Refill Protocol.      Jeffry Zapata RN, BSN, PHN

## 2020-03-09 DIAGNOSIS — I10 ESSENTIAL HYPERTENSION WITH GOAL BLOOD PRESSURE LESS THAN 140/90: ICD-10-CM

## 2020-03-09 NOTE — TELEPHONE ENCOUNTER
"Requested Prescriptions   Pending Prescriptions Disp Refills     losartan (COZAAR) 50 MG tablet [Pharmacy Med Name: LOSARTAN POTASSIUM 50 MG TAB]  Last Written Prescription Date:  12/13/19  Last Fill Quantity: 90,  # refills: 0   Last Office Visit with [facility], [facility] or [facility] prescribing provider:  02/05/2020- Vocal  Future Office Visit:    90 tablet 0     Sig: TAKE 1 TABLET (50 MG) BY MOUTH DAILY       Angiotensin-II Receptors Passed - 3/9/2020  9:32 AM        Passed - Last blood pressure under 140/90 in past 12 months     BP Readings from Last 3 Encounters:   02/05/20 134/70   06/17/19 126/59   05/31/19 114/58                 Passed - Recent (12 mo) or future (30 days) visit within the authorizing provider's specialty     Patient has had an office visit with the authorizing provider or a provider within the authorizing providers department within the previous 12 mos or has a future within next 30 days. See \"Patient Info\" tab in inbasket, or \"Choose Columns\" in Meds & Orders section of the refill encounter.              Passed - Medication is active on med list        Passed - Patient is age 18 or older        Passed - Normal serum creatinine on file in past 12 months     Recent Labs   Lab Test 02/05/20  1140   CR 1.10             Passed - Normal serum potassium on file in past 12 months     Recent Labs   Lab Test 02/05/20  1140   POTASSIUM 5.0                         "

## 2020-03-11 RX ORDER — LOSARTAN POTASSIUM 50 MG/1
50 TABLET ORAL DAILY
Qty: 90 TABLET | Refills: 1 | Status: SHIPPED | OUTPATIENT
Start: 2020-03-11 | End: 2020-01-01

## 2020-04-16 NOTE — TELEPHONE ENCOUNTER
"Requested Prescriptions   Pending Prescriptions Disp Refills     carvedilol (COREG) 6.25 MG tablet [Pharmacy Med Name: CARVEDILOL 6.25 MG TABLET]  Last Written Prescription Date:  05/02/19  Last Fill Quantity: 180,  # refills: 3   Last Office Visit with G, P or [facility] prescribing provider:  02/05/2020-Vocal   Future Office Visit:    180 tablet 3     Sig: TAKE 1 TABLET BY MOUTH TWO TIMES DAILY WITH MEALS       Beta-Blockers Protocol Passed - 4/16/2020 12:25 AM        Passed - Blood pressure under 140/90 in past 12 months     BP Readings from Last 3 Encounters:   02/05/20 134/70   06/17/19 126/59   05/31/19 114/58                 Passed - Patient is age 6 or older        Passed - Recent (12 mo) or future (30 days) visit within the authorizing provider's specialty     Patient has had an office visit with the authorizing provider or a provider within the authorizing providers department within the previous 12 mos or has a future within next 30 days. See \"Patient Info\" tab in inbasket, or \"Choose Columns\" in Meds & Orders section of the refill encounter.              Passed - Medication is active on med list             "

## 2020-04-20 NOTE — TELEPHONE ENCOUNTER
Prescription approved per [facility] Refill Protocol.      Jeffry Zapata RN, BSN, PHN

## 2020-05-01 PROBLEM — I25.708 CORONARY ARTERY DISEASE OF BYPASS GRAFT OF NATIVE HEART WITH STABLE ANGINA PECTORIS (H): Status: ACTIVE | Noted: 2019-01-31

## 2020-05-01 NOTE — PATIENT INSTRUCTIONS
At [facility], we strive to deliver an exceptional experience to you, every time we see you.  If you receive a survey in the mail, please send us back your thoughts. We really do value your feedback.    Based on your medical history, these are the current health maintenance/preventive care services that you are due for (some may have been done at this visit.)  Health Maintenance Due   Topic Date Due     ZOSTER IMMUNIZATION (1 of 2) 02/16/1998         Suggested websites for health information:  Www.Critical Links.org : Up to date and easily searchable information on multiple topics.  Www.medlineplus.gov : medication info, interactive tutorials, watch real surgeries online  Www.familydoctor.org : good info from the Academy of Family Physicians  Www.cdc.gov : public health info, travel advisories, epidemics (H1N1)  Www.aap.org : children's health info, normal development, vaccinations  Www.health.[facility].[place] : [place] dept of health, public health issues in [place], N1N1    Your care team:                            Family Medicine Internal Medicine   MD Kyle Mario MD Shantel Branch-Fleming, MD Katya Georgiev PA-C Nam Ho, MD Pediatrics   Rashad Abbott, PAYOLI Vera, CNP MD Rohini Sherman CNP, MD Deborah Mielke, MD Kim Thein, APRN CNP      Clinic hours: Monday - Thursday 7 am-7 pm; Fridays 7 am-5 pm.   Urgent care: Monday - Friday 11 am-9 pm; Saturday and Sunday 9 am-5 pm.  Pharmacy : Monday -Thursday 8 am-8 pm; Friday 8 am-6 pm; Saturday and Sunday 9 am-5 pm.     Clinic: [phone]   Pharmacy: [phone]

## 2020-05-01 NOTE — PROGRESS NOTES
"Joseph Cardona is a 70 year old male who is being evaluated via a billable telephone visit.      The patient has been notified of following:     \"This telephone visit will be conducted via a call between you and your physician/provider. We have found that certain health care needs can be provided without the need for a physical exam.  This service lets us provide the care you need with a short phone conversation.  If a prescription is necessary we can send it directly to your pharmacy.  If lab work is needed we can place an order for that and you can then stop by our lab to have the test done at a later time.    Telephone visits are billed at different rates depending on your insurance coverage. During this emergency period, for some insurers they may be billed the same as an in-person visit.  Please reach out to your insurance provider with any questions.    If during the course of the call the physician/provider feels a telephone visit is not appropriate, you will not be charged for this service.\"    Patient has given verbal consent for Telephone visit?  Yes    How would you like to obtain your AVS? Mail a copy    Subjective     Joseph Cardona is a 70 year old male who presents to clinic today for the following health issues:    Hospitals in [place]    Hospital Follow-up Visit:    Hospital/Nursing Home/IP Rehab Facility: [facility]  Date of Admission: 4/26/20  Date of Discharge: 4/29/20  Reason(s) for Admission: chest pain (NSTEMI)      \"He developed midsternal chest pain at 2030 tonight while lying in bed talking to his children on smart phone. He reports taking 2 nitroglycerin with improvement in his pain from a 9/10 to a 2/10 in severity.      He has this pain almost daily but it is usually with exertion.  It improves with nitro.  This time it occurred at rest     Pain resolved in the emergency department, though Troponin up-trending.     Patient was started on heparin, nitroglycerin patch   Underwent cor angiogram " "4/28/2020 which was positive for sub-acutely 100% occluded RPDA bypass graft- plan for medical management only. Patient was started on entresto, IMDUR continued. Chest pain was improved. Discharged with close follow up.     PRINCIPAL DISCHARGE DIAGNOSIS: NSTEMI\"      Was your hospitalization related to COVID-19? No   Problems taking medications regularly:  None  Medication changes since discharge: stop losartan, started on Entresto (sacubitril/valsartan)   Problems adhering to non-medication therapy:  None    Summary of hospitalization:  CareEverywhere information obtained and reviewed  Diagnostic Tests/Treatments reviewed.  Follow up needed: \"BMP at post-hospital visit\" expected in 1-2 weeks  Other Healthcare Providers Involved in Patient s Care:         Specialist appointment - \"TELEPHONE APPOINTMENT with SHAISTA An CNP from [facility] on Monday, May 4th at 11:00 AM.\"  Update since discharge: improved.     Post Discharge Medication Reconciliation: discharge medications reconciled and changed, per note/orders (see AVS).  Plan of care communicated with patient            Objective   Reported vitals:  There were no vitals taken for this visit.   PSYCH: Alert and oriented times 3; coherent speech, normal   rate and volume, able to articulate logical thoughts, able   to abstract reason, no tangential thoughts, no hallucinations   or delusions  His affect is normal  RESP: No cough, no audible wheezing, able to talk in full sentences  Remainder of exam unable to be completed due to telephone visits    Diagnostic Test Results:          ASSESSMENT/PLAN:  (I21.4) Non-ST elevation myocardial infarction (NSTEMI) in recovery phase (H)  (primary encounter diagnosis)  Comment: Now asymptomatic.  Involving bypass graft  Plan: Follow-up as described above    (I25.838) Coronary artery disease of bypass graft of native heart with stable angina pectoris (H)  Comment: Medical management, per " cardiology.    Plan: sacubitril-valsartan (ENTRESTO) 24-26 MG per         tablet        I refilled this medication, but I advised him, that he should be receiving further refills (including anticipated dose changes) from his cardiologist office    (I10) Essential hypertension with goal blood pressure less than 140/90  Comment: Discharge summary suggest rechecking the BMP in 1 to 2 weeks, perhaps due to the increase in his BUN  Plan: Basic metabolic panel  (Ca, Cl, CO2, Creat,         Gluc, K, Na, BUN)             Return in about 3 months (around 8/5/2020) for cholesterol, blood pressure check, lab tests, recheck medications.      Phone call duration:  6 minutes    Rupesh Way MD

## 2020-05-29 NOTE — PROGRESS NOTES
MTM ENCOUNTER  SUBJECTIVE/OBJECTIVE:                           Joseph Cardona is a 70 year old male called for a follow-up visit. He was referred to me from [employer] insurance plan.  Today's visit is a follow-up MTM visit from 5/31/19.    Patient consented to a telehealth visit: yes  Telemedicine Visit Details  Type of service:  Telephone visit  Start Time: 11:20 AM  End Time: 11:36 AM  Originating Location (pt. Location): Home  Distant Location (provider location):  [facility] MTM  Mode of Communication:  Telephone    Chief Complaint: Med review.    Allergies/ADRs: Reviewed in EHR  Tobacco:  reports that he has quit smoking. He has never used smokeless tobacco.  Alcohol: none  Caffeine: 1-2 cups/day of coffee  Activity: slightly increased since hospitalization  PMH: Reviewed in EHR    Medication Adherence: No issues. Adherence is excellent; denies any missed doses. He sets up own med boxes and takes meds 2 times per day.     Hx STEMI/HTN/Paroxysmal A fib/Ischemic cardiomyopathy/PVD:   carvedilol 6.25mg BID  furosemide 20mg daily  Eliquis 5 mg twice daily   Plavix 75mg daily  Isosorbide mononitrate 30 mg daily   sacubitril-valsartan 24-26 mg twice daily   NTG available, none used since hospitalization.    He had another NSTEMI and was hospitalized 4/26/20-4/29/20 at [facility] for this. Follows with cardiology. Aspirin was stopped in the past d/t an ER visit for a shaving cut that wouldn't stop bleeding.  Denies abnormal bruising/bleeding or other side effects. Hx of multiple heat attacks. Denies lightheadedness or orthostatic hypotension. Does not check BP outside of clinic.  BP Readings from Last 3 Encounters:   02/05/20 134/70   06/17/19 126/59   05/31/19 114/58     Hyperlipidemia:  atorvastatin 80mg once daily    Pt reports no significant myalgias or other side effects.  Recent Labs   Lab Test 02/05/20  1140 01/09/19  0856   CHOL 126 110   HDL 40 33*   LDL 72 64   TRIG 71 65     COPD:    Incruse Ellipta once daily  Albuterol used rarely, ~ 5x/month.    Cost is not a problem for inhalers.  Pt reports the following symptoms: none.   Has spirometry been completed: Yes      Insomnia:   zolpidem 5mg at bedtime as needed (~4 times a week)     Use of zolpidem has increased recently from ~1-2x/week.   Pt reports trouble staying asleep, but that the zolpidem is effective when he uses it. Asks about Advil PM. No reported SE.    Today's Vitals: There were no vitals taken for this visit. - phone visit      ASSESSMENT:                          Medicare Part D topics discussed:Medication safety    Medication Adherence: good, no issues identified    Hx STEMI/HTN: Stable. Plan in place with cardiology.    Hyperlipidemia: Stable. Pt is on high intensity statin which is indicated based on 2019 ACC/AHA guidelines for lipid management.      COPD: Stable.     Insomnia: Needs improvement. Education zolpidem provided. Would not recommend Advil PM, he may benefit from melatonin.    PLAN:                            Patient:    1. Consider starting melatonin 3 mg in the evening (at supper or a couple hours before bedtime). Can increase the dose to 6 mg if needed in 2-4 weeks. (Maximum dose is 10 mg/day).    2. Please avoid NSAID medications, these are medications that contain ibuprofen (Motrin, Advil), or naproxen (Aleve), as these can be harmful on the heart, kidneys, and stomach.     I spent 20 minutes with this patient today (an extra 15 minutes was spent creating the Medication Action Plan). All changes were made via collaborative practice agreement with Kyle López. A copy of the visit note was provided to the patient's primary care provider.    Will follow up in 6 months.    The patient was mailed a summary of these recommendations.     Lila Carter, PharmD  Medication Therapy Management Pharmacist  [phone]

## 2020-05-29 NOTE — LETTER
"     [facility] MTM     Date: 2020    Joseph Cardona  [address] [address]    Dear Mr. Cardona,    Thank you for talking with me on 20 about your health and medications. Medicare s MTM (Medication Therapy Management) program helps you understand your medications and use them safely.      This letter includes an action plan (Medication Action Plan) and medication list (Personal Medication List). The action plan has steps you should take to help you get the best results from your medications. The medication list will help you keep track of your medications and how to use them the right way.       Have your action plan and medication list with you when you talk with your doctors, pharmacists, and other healthcare providers in your care team.     Ask your doctors, pharmacists, and other healthcare providers to update the action plan and medication list at every visit.     Take your medication list with you if you go to the hospital or emergency room.     Give a copy of the action plan and medication list to your family or caregivers.     If you want to talk about this letter or any of the papers with it, please call   [phone].   We look forward to working with you, your doctors, and other healthcare providers to help you stay healthy.     Sincerely,    Chinyere Carter [facility]      Enclosed: Medication Action Plan and Personal Medication List    MEDICATION ACTION PLAN FOR Joseph Cardona,  1950     This action plan will help you get the best results from your medications if you:   1. Read \"What we talked about.\"   2. Take the steps listed in the \"What I need to do\" boxes.   3. Fill in \"What I did and when I did it.\"   4. Fill in \"My follow-up plan\" and \"Questions I want to ask.\"     Have this action plan with you when you talk with your doctors, pharmacists, and other healthcare providers in your care team. Share this with your family or caregivers " too.  DATE PREPARED: 2020  What we talked about: Medications for sleep                                                  What I need to do:     1. Consider starting melatonin 3 mg in the evening (at supper or a couple hours before bedtime). Can increase the dose to 6 mg if needed in 2-4 weeks. (Maximum dose is 10 mg/day).    2. Please avoid NSAID medications, these are medications that contain ibuprofen (Motrin, Advil), or naproxen (Aleve), as these can be harmful on the heart, kidneys, and stomach.    What I did and when I did it:                                              My follow-up plan:                 Questions I want to ask:              If you have any questions about your action plan, call Chinyere Carter [facility], Phone: [phone] , Monday-Friday 8-4:30pm.           MEDICATION LIST FOR Joseph Cardona,  1950     This medication list was made for you after we talked. We also used information from your doctor's chart.      Use blank rows to add new medications. Then fill in the dates you started using them.    Cross out medications when you no longer use them. Then write the date and why you stopped using them.    Ask your doctors, pharmacists, and other healthcare providers to update this list at every visit. Keep this list up-to-date with:       Prescription medications    Over the counter drugs     Herbals    Vitamins    Minerals      If you go to the hospital or emergency room, take this list with you. Share this with your family or caregivers too.     DATE PREPARED: 2020  Allergies or side effects: Lisinopril     Medication:  APIXABAN 5 MG PO TABS      How I use it:  Take 5 mg by mouth 2 times daily      Why I use it: Paroxysmal atrial fibrillation (H); Peripheral vascular disease (H)    Prescriber:  Darryl Hernandez MD      Date I started using it:       Date I stopped using it:         Why I stopped using it:            Medication:  ATORVASTATIN CALCIUM 80 MG PO TABS      How I use it:   TAKE 1 TABLET BY MOUTH EVERYDAY AT BEDTIME      Why I use it:  cholesterol    Prescriber:  Dr. Kyle López      Date I started using it:       Date I stopped using it:         Why I stopped using it:            Medication:  CARVEDILOL 6.25 MG PO TABS      How I use it:  TAKE 1 TABLET BY MOUTH TWO TIMES DAILY WITH MEALS      Why I use it: Essential hypertension with goal blood pressure less than 140/90; Coronary artery disease of native heart with stable angina pectoris, unspecified vessel or lesion type (H)    Prescriber:  Lisa Cadena MD      Date I started using it:       Date I stopped using it:         Why I stopped using it:            Medication:  CLOPIDOGREL BISULFATE 75 MG PO TABS      How I use it:  TAKE 1 TABLET (75 MG) BY MOUTH DAILY      Why I use it: Coronary artery disease of native heart with stable angina pectoris, unspecified vessel or lesion type (H)    Prescriber:  Kyle López MD      Date I started using it:       Date I stopped using it:         Why I stopped using it:            Medication:  FUROSEMIDE 20 MG PO TABS      How I use it:  TAKE 1 TABLET (20 MG) BY MOUTH DAILY      Why I use it: Essential hypertension with goal blood pressure less than 140/90    Prescriber:  Kyle López MD      Date I started using it:       Date I stopped using it:         Why I stopped using it:            Medication:  INCRUSE ELLIPTA 62.5 MCG/INH IN AEPB      How I use it:  INHALE 1 PUFF BY MOUTH EVERY DAY      Why I use it: COPD, mild (H)    Prescriber:  Kyle López MD      Date I started using it:       Date I stopped using it:         Why I stopped using it:            Medication:  ISOSORBIDE MONONITRATE ER 30 MG PO TB24      How I use it:  Take 1 tablet (30 mg) by mouth every morning      Why I use it: Chronic ischemic heart disease    Prescriber:  Kyle López MD      Date I started using it:       Date I stopped using it:         Why I stopped using it:             Medication:  MELATONIN 3 MG PO CAPS      How I use it:  Take 3-6 mg by mouth every evening      Why I use it:  sleep    Prescriber:  Dr. Kyle López      Date I started using it:       Date I stopped using it:         Why I stopped using it:            Medication:  NITROGLYCERIN 0.4 MG SL SUBL      How I use it:  PLACE 1 TABLET UNDER THE TONGUE EVERY 5 MINUTES AS NEEDED FOR CHEST PAIN.      Why I use it: Chronic ischemic heart disease    Prescriber:  Kyle López MD      Date I started using it:       Date I stopped using it:         Why I stopped using it:            Medication:  SACUBITRIL-VALSARTAN 24-26 MG PO TABS      How I use it:  Take 1 tablet by mouth 2 times daily for heart disease (& blood pressure), or as directed by your cardiologist.      Why I use it: Coronary artery disease of bypass graft of native heart with stable angina pectoris (H)    Prescriber:  Rupesh Way MD      Date I started using it:       Date I stopped using it:         Why I stopped using it:            Medication:  VENTOLIN  (90 BASE) MCG/ACT IN AERS      How I use it:  INHALE 2 PUFFS INTO THE LUNGS EVERY 4 HOURS AS NEEDED FOR SHORTNESS OF BREATH / DYSPNEA OR WHEEZING      Why I use it: COPD, mild (H)    Prescriber:  Kyle López MD      Date I started using it:       Date I stopped using it:         Why I stopped using it:            Medication:  ZOLPIDEM TARTRATE 5 MG PO TABS      How I use it:  TAKE 1 TABLET BY MOUTH AS NEEDED FOR SLEEP      Why I use it: Persistent insomnia    Prescriber:  Kyle López MD      Date I started using it:       Date I stopped using it:         Why I stopped using it:            Medication:         How I use it:         Why I use it:      Prescriber:         Date I started using it:       Date I stopped using it:         Why I stopped using it:            Medication:         How I use it:         Why I use it:      Prescriber:         Date I started using it:        Date I stopped using it:         Why I stopped using it:            Medication:         How I use it:         Why I use it:      Prescriber:         Date I started using it:       Date I stopped using it:         Why I stopped using it:              Other Information:     If you have any questions about your action plan, call [phone].    According to the Paperwork Reduction Act of 1995, no persons are required to respond to a collection of information unless it displays a valid OMB control number. The valid OMB number for this information collection is [id number]. The time required to complete this information collection is estimated to average 40 minutes per response, including the time to review instructions, searching existing data resources, gather the data needed, and complete and review the information collection. If you have any comments concerning the accuracy of the time estimate(s) or suggestions for improving this form, please write to: CMS, Attn: CHANDAN Reports Clearance Officer, [address] [place] [phone].

## 2020-05-29 NOTE — Clinical Note
SHABBIR POLANCO note, thanks!    Lila Carter, PharmD  Medication Therapy Management Pharmacist  [phone]

## 2020-06-18 NOTE — TELEPHONE ENCOUNTER
Prescription approved per G Refill Protocol.    Jacquelyn Finn RN, [facility] Triage

## 2020-08-05 NOTE — TELEPHONE ENCOUNTER
Called to let patient know he is due for an A1C lab, no answer, LVM.  Unable to order, as diabetes is not on diagnosis list. Informed him to ask about this at next PCP visit.     Lila Carter, PharmD  Medication Therapy Management Pharmacist  [phone]

## 2020-08-10 NOTE — PROGRESS NOTES
Subjective     Joseph Cardona is a 70 year old male who presents to clinic today for the following health issues:    HPI       Hyperlipidemia Follow-Up      Are you regularly taking any medication or supplement to lower your cholesterol?   Yes- Atorvastatin    Are you having muscle aches or other side effects that you think could be caused by your cholesterol lowering medication?  No    Vascular Disease Follow-up      How often do you take nitroglycerin? A few times monthly    Do you take an aspirin every day? No      How many servings of fruits and vegetables do you eat daily?  0-1    On average, how many sweetened beverages do you drink each day (Examples: soda, juice, sweet tea, etc.  Do NOT count diet or artificially sweetened beverages)?   0    How many days per week do you exercise enough to make your heart beat faster? 3 or less    How many minutes a day do you exercise enough to make your heart beat faster? 9 or less    How many days per week do you miss taking your medication? 0    COPD Follow-Up    Overall, how are your COPD symptoms since your last clinic visit?  Better    How much fatigue or shortness of breath do you have when you are walking?  Same as usual    How much shortness of breath do you have when you are resting?  Same as usual    How often do you cough? Rarely    Have you noticed any change in your sputum/phlegm?  No    Have you experienced a recent fever? No    Please describe how far you can walk without stopping to rest:  Less than 1 block    How many flights of stairs are you able to walk up without stopping?  1    Have you had any Emergency Room Visits, Urgent Care Visits, or Hospital Admissions because of your COPD since your last office visit?  No    History   Smoking Status     Former Smoker   Smokeless Tobacco     Never Used     Comment: quit 2 years ago        Hypertension Follow-up      Outpatient blood pressures monitoring: no    Low Salt Diet: no    Adverse effects:  none    Compliance: excellent    Secondary causes: none    Chronic kidney disease: yes    Hyperthyroidism: no    Anxiety: no    Decongestants: no    Substance abuse: no    Diabetes: no    Ischemic heart disease: yes    Stroke: no    Hyperlipidemia: yes         Patient Active Problem List   Diagnosis     Hyperlipidemia LDL goal <70     Essential hypertension with goal blood pressure less than 140/90     Advanced directives, counseling/discussion     COPD, mild (H)     Positive hepatitis C antibody test     Paroxysmal atrial fibrillation (H)     Intermittent claudication (H)     Peripheral vascular disease (H)     S/P insertion of iliac artery stent     S/P left femoral endarterectomy     Coronary artery disease of bypass graft of native heart with stable angina pectoris (H)     Chronic ischemic heart disease     Ischemic cardiomyopathy     Persistent insomnia     Past Surgical History:   Procedure Laterality Date     CARDIAC SURGERY      Bypass double and quadruple     EYE SURGERY      cataract surgery     HEAD & NECK SURGERY      Caratid surgery     VASCULAR SURGERY         Social History     Tobacco Use     Smoking status: Former Smoker     Smokeless tobacco: Never Used     Tobacco comment: quit 2 years ago   Substance Use Topics     Alcohol use: Yes     Alcohol/week: 0.0 standard drinks     No family history on file.      Allergies   Allergen Reactions     Lisinopril Cough     Recent Labs   Lab Test 08/10/20  1242 05/11/20  1107 02/05/20  1140 06/17/19  1314 01/09/19  0856  04/25/18  1118  06/26/17  1154  11/17/15  1212   A1C  --   --   --   --  5.8*  --  5.7*  --  5.6   < >  --    LDL 51  --  72  --  64  --   --    < > 58   < >  --    HDL 31*  --  40  --  33*  --   --    < > 36*   < >  --    TRIG 67  --  71  --  65  --   --    < > 77   < >  --    ALT 20  --  16 17 12   < >  --    < > 15   < >  --    CR 1.00 0.99 1.10 0.99 1.04   < >  --    < > 1.03   < >  --    GFRESTIMATED 75 76 67 76 72   < >  --    < > 72   <  ">  --    GFRESTBLACK 86 88 77 88 83   < >  --    < > 87   < >  --    POTASSIUM 4.8 4.2 5.0 4.1 4.5   < >  --    < > 4.6   < >  --    TSH  --   --   --   --   --   --  2.60  --   --   --  4.71*    < > = values in this interval not displayed.      BP Readings from Last 3 Encounters:   08/10/20 106/63   02/05/20 134/70   06/17/19 126/59    Wt Readings from Last 3 Encounters:   08/10/20 78.9 kg (174 lb)   02/05/20 84.8 kg (187 lb)   06/17/19 77.1 kg (170 lb)                    Reviewed and updated as needed this visit by Provider         Review of Systems   CONSTITUTIONAL: NEGATIVE for fever, chills, change in weight  INTEGUMENTARY/SKIN: NEGATIVE for worrisome rashes, moles or lesions  EYES: NEGATIVE for vision changes or irritation  ENT/MOUTH: NEGATIVE for ear, mouth and throat problems  RESP: NEGATIVE for significant cough or SOB  CV: POSITIVE for peripheral vascular disease and paroxysmal atrial fibrillation.  GI: NEGATIVE for nausea, abdominal pain, heartburn, or change in bowel habits  : NEGATIVE for frequency, dysuria, or hematuria  MUSCULOSKELETAL: NEGATIVE for significant arthralgias or myalgia  NEURO: NEGATIVE for weakness, dizziness or paresthesias  ENDOCRINE: NEGATIVE for temperature intolerance, skin/hair changes  HEME: NEGATIVE for bleeding problems  PSYCHIATRIC: NEGATIVE for changes in mood or affect      Objective    /63 (BP Location: Right arm, Patient Position: Chair, Cuff Size: Adult Regular)   Pulse 80   Temp 98.2  F (36.8  C) (Oral)   Ht 1.79 m (5' 10.47\")   Wt 78.9 kg (174 lb)   SpO2 98%   BMI 24.63 kg/m       Physical Exam   GENERAL: healthy, alert and no distress  EYES: Eyes grossly normal to inspection, PERRL and conjunctivae and sclerae normal  HENT: ear canals and TM's normal, nose and mouth without ulcers or lesions  NECK: no adenopathy, no asymmetry, masses, or scars and thyroid normal to palpation  RESP: lungs clear to auscultation - no rales, rhonchi or wheezes  CV: regular " rate and rhythm, normal S1 S2, no S3 or S4, no murmur, click or rub, no peripheral edema and peripheral pulses strong  ABDOMEN: soft, nontender, no hepatosplenomegaly, no masses and bowel sounds normal  MS: no gross musculoskeletal defects noted, no edema  SKIN: no suspicious lesions or rashes  NEURO: Normal strength and tone, mentation intact and speech normal  PSYCH: mentation appears normal, affect normal/bright    Diagnostic Test Results:  Results for orders placed or performed in visit on 08/10/20   CBC with platelets and differential     Status: Abnormal   Result Value Ref Range    WBC 8.2 4.0 - 11.0 10e9/L    RBC Count 4.55 4.4 - 5.9 10e12/L    Hemoglobin 12.8 (L) 13.3 - 17.7 g/dL    Hematocrit 40.5 40.0 - 53.0 %    MCV 89 78 - 100 fl    MCH 28.1 26.5 - 33.0 pg    MCHC 31.6 31.5 - 36.5 g/dL    RDW 14.0 10.0 - 15.0 %    Platelet Count 273 150 - 450 10e9/L    % Neutrophils 71.0 %    % Lymphocytes 12.3 %    % Monocytes 11.8 %    % Eosinophils 4.2 %    % Basophils 0.7 %    Absolute Neutrophil 5.8 1.6 - 8.3 10e9/L    Absolute Lymphocytes 1.0 0.8 - 5.3 10e9/L    Absolute Monocytes 1.0 0.0 - 1.3 10e9/L    Absolute Eosinophils 0.3 0.0 - 0.7 10e9/L    Absolute Basophils 0.1 0.0 - 0.2 10e9/L    Diff Method Automated Method    Comprehensive metabolic panel (BMP + Alb, Alk Phos, ALT, AST, Total. Bili, TP)     Status: None   Result Value Ref Range    Sodium 136 133 - 144 mmol/L    Potassium 4.8 3.4 - 5.3 mmol/L    Chloride 107 94 - 109 mmol/L    Carbon Dioxide 24 20 - 32 mmol/L    Anion Gap 5 3 - 14 mmol/L    Glucose 98 70 - 99 mg/dL    Urea Nitrogen 24 7 - 30 mg/dL    Creatinine 1.00 0.66 - 1.25 mg/dL    GFR Estimate 75 >60 mL/min/[1.73_m2]    GFR Estimate If Black 86 >60 mL/min/[1.73_m2]    Calcium 8.5 8.5 - 10.1 mg/dL    Bilirubin Total 1.1 0.2 - 1.3 mg/dL    Albumin 3.5 3.4 - 5.0 g/dL    Protein Total 6.9 6.8 - 8.8 g/dL    Alkaline Phosphatase 90 40 - 150 U/L    ALT 20 0 - 70 U/L    AST 12 0 - 45 U/L   BNP-N terminal  pro     Status: Abnormal   Result Value Ref Range    N-Terminal Pro Bnp 4,261 (H) 0 - 125 pg/mL   Magnesium     Status: None   Result Value Ref Range    Magnesium 2.2 1.6 - 2.3 mg/dL   Lipid panel reflex to direct LDL Non-fasting     Status: Abnormal   Result Value Ref Range    Cholesterol 95 <200 mg/dL    Triglycerides 67 <150 mg/dL    HDL Cholesterol 31 (L) >39 mg/dL    LDL Cholesterol Calculated 51 <100 mg/dL    Non HDL Cholesterol 64 <130 mg/dL           Assessment & Plan     1. Hyperlipidemia LDL goal <70    - atorvastatin (LIPITOR) 80 MG tablet; TAKE 1 TABLET BY MOUTH EVERYDAY AT BEDTIME  Dispense: 90 tablet; Refill: 3  - Lipid panel reflex to direct LDL Non-fasting    2. COPD, mild (H)    - umeclidinium (INCRUSE ELLIPTA) 62.5 MCG/INH inhaler; INHALE 1 PUFF BY MOUTH EVERY DAY  Dispense: 90 each; Refill: 3  - albuterol (VENTOLIN HFA) 108 (90 Base) MCG/ACT inhaler; INHALE 2 PUFFS INTO THE LUNGS EVERY 4 HOURS AS NEEDED FOR SHORTNESS OF BREATH / DYSPNEA OR WHEEZING  Dispense: 18 Inhaler; Refill: 3    3. Chronic ischemic heart disease    - carvedilol (COREG) 6.25 MG tablet; Take 1 tablet (6.25 mg) by mouth 2 times daily (with meals)  Dispense: 180 tablet; Refill: 3  - clopidogrel (PLAVIX) 75 MG tablet; Take 1 tablet (75 mg) by mouth daily  Dispense: 90 tablet; Refill: 3  - sacubitril-valsartan (ENTRESTO) 24-26 MG per tablet; TAKE 1 TABLET BY MOUTH 2 TIMES DAILY FOR HEART DISEASE (& BLOOD PRESSURE)  Dispense: 60 tablet; Refill: 11  - isosorbide mononitrate (IMDUR) 60 MG 24 hr tablet; Take 1 tablet (60 mg) by mouth every morning  Dispense: 90 tablet; Refill: 3  - nitroGLYcerin (NITROSTAT) 0.4 MG sublingual tablet; PLACE 1 TABLET UNDER THE TONGUE EVERY 5 MINUTES AS NEEDED FOR CHEST PAIN.  Dispense: 25 tablet; Refill: 11    4. Ischemic cardiomyopathy    - sacubitril-valsartan (ENTRESTO) 24-26 MG per tablet; TAKE 1 TABLET BY MOUTH 2 TIMES DAILY FOR HEART DISEASE (& BLOOD PRESSURE)  Dispense: 60 tablet; Refill: 11  -  BNP-N terminal pro    5. Essential hypertension with goal blood pressure less than 140/90    - carvedilol (COREG) 6.25 MG tablet; Take 1 tablet (6.25 mg) by mouth 2 times daily (with meals)  Dispense: 180 tablet; Refill: 3  - furosemide (LASIX) 20 MG tablet; Take 1 tablet (20 mg) by mouth daily  Dispense: 90 tablet; Refill: 3    6. Persistent insomnia    - zolpidem (AMBIEN) 5 MG tablet; TAKE 1 TABLET BY MOUTH AS NEEDED FOR SLEEP  Dispense: 30 tablet; Refill: 5    7. Paroxysmal atrial fibrillation (H)    - apixaban ANTICOAGULANT (ELIQUIS) 5 MG tablet; Take 1 tablet (5 mg) by mouth 2 times daily  Dispense: 60 tablet; Refill: 11  - Comprehensive metabolic panel (BMP + Alb, Alk Phos, ALT, AST, Total. Bili, TP)  - Magnesium    8. Peripheral vascular disease (H)    - apixaban ANTICOAGULANT (ELIQUIS) 5 MG tablet; Take 1 tablet (5 mg) by mouth 2 times daily  Dispense: 60 tablet; Refill: 11       FUTURE APPOINTMENTS:       - Follow-up visit in 6 months.      Kyle López MD  [facility]

## 2020-08-18 PROBLEM — G47.00 PERSISTENT INSOMNIA: Status: ACTIVE | Noted: 2020-01-01

## 2020-08-18 PROBLEM — I25.5 ISCHEMIC CARDIOMYOPATHY: Status: ACTIVE | Noted: 2020-01-01

## 2020-12-04 NOTE — TELEPHONE ENCOUNTER
Called to schedule MTM appointment. No answer, LVM.    Lila Carter, PharmD  Medication Therapy Management Pharmacist  [phone]

## 2021-01-01 ENCOUNTER — VIRTUAL VISIT (OUTPATIENT)
Dept: PHARMACY | Facility: CLINIC | Age: 71
End: 2021-01-01
Payer: COMMERCIAL

## 2021-01-01 ENCOUNTER — TRANSFERRED RECORDS (OUTPATIENT)
Dept: HEALTH INFORMATION MANAGEMENT | Facility: CLINIC | Age: 71
End: 2021-01-01

## 2021-01-01 DIAGNOSIS — J44.9 COPD, MILD (H): ICD-10-CM

## 2021-01-01 DIAGNOSIS — I25.9 CHRONIC ISCHEMIC HEART DISEASE: ICD-10-CM

## 2021-01-01 DIAGNOSIS — G47.00 PERSISTENT INSOMNIA: ICD-10-CM

## 2021-01-01 DIAGNOSIS — I48.0 PAROXYSMAL ATRIAL FIBRILLATION (H): ICD-10-CM

## 2021-01-01 DIAGNOSIS — E78.5 HYPERLIPIDEMIA LDL GOAL <70: ICD-10-CM

## 2021-01-01 DIAGNOSIS — I50.20 HEART FAILURE WITH REDUCED EJECTION FRACTION, NYHA CLASS I (H): Primary | ICD-10-CM

## 2021-01-01 DIAGNOSIS — I10 ESSENTIAL HYPERTENSION: ICD-10-CM

## 2021-01-01 PROCEDURE — 99607 MTMS BY PHARM ADDL 15 MIN: CPT | Mod: TEL | Performed by: PHARMACIST

## 2021-01-01 PROCEDURE — 99605 MTMS BY PHARM NP 15 MIN: CPT | Mod: TEL | Performed by: PHARMACIST

## 2021-01-01 RX ORDER — ALBUTEROL SULFATE 90 UG/1
AEROSOL, METERED RESPIRATORY (INHALATION)
Qty: 18 INHALER | Refills: 3 | Status: SHIPPED | OUTPATIENT
Start: 2021-01-01

## 2021-02-18 NOTE — PATIENT INSTRUCTIONS
Recommendations from today's MTM visit:                                                    MTM (medication therapy management) is a service provided by a clinical pharmacist designed to help you get the most of out of your medicines.   Today we reviewed what your medicines are for, how to know if they are working, that your medicines are safe and how to make your medicine regimen as easy as possible.      1. Continue present medications.  2. Refill for albuterol inhaler sent to pharmacy.  3. Request for zolpidem refill sent to primary care provider.    It was great to speak with you today.  I value your experience and would be very thankful for your time with providing feedback on our clinic survey. You may receive a survey via email or text message in the next few days.     Next MTM visit: 1 year for annual medication review    To schedule another MTM appointment, please call the clinic directly or you may call the MTM scheduling line at [phone] or toll-free at [phone].     My Clinical Pharmacist's contact information:                                                      It was a pleasure talking with you today!  Please feel free to contact me with any questions or concerns you have.      Renetta Cuba, Pharm.D., BCACP  Medication Therapy Management Pharmacist  [phone]

## 2021-02-18 NOTE — PROGRESS NOTES
Medication Therapy Management (MTM) Encounter    ASSESSMENT:                            Medication Adherence/Access: No issues identified    HFrEF/Ischemic cardiomyopathy/HTN: Stable.    Afib: Stable.    Hyperlipidemia: Stable.    COPD: Stable.    Insomnia: Stable.    PLAN:                            1. Continue present medications.  2. Refill for albuterol inhaler sent to pharmacy.  3. Request for zolpidem refill sent to primary care provider.    Follow-up: 1 year, sooner if needed    SUBJECTIVE/OBJECTIVE:                          Joseph Cardona is a 71 year old male called for a follow-up visit. He was referred to me from his Part D Plan with [employer]..  Today's visit is a follow-up MTM visit from 5/29/2020 visit with Lila Carter PharmD. This is his first visit of 2021.     Reason for visit: Medication Review, no specific concerns.    Allergies/ADRs: Reviewed in chart  Tobacco: He reports that he has quit smoking. He has never used smokeless tobacco.  Alcohol: none  Caffeine: 1-2 cups/day of coffee  Activity: None except work around the house/yard  Past Medical History: Reviewed in chart      Medication Adherence/Access: no issues reported  Patient uses pill box(es).      HFrEF/Ischemic cardiomyopathy/HTN:   Hx STEMI, CABG ('94, '11), NURY ('15). Follows [facility], last visit 1/25/21 -- stable no med changes made .BP/P at that visit 132/60mmHg, 80bpm.   Current medications include carvedilol 6.25mg twice daily, furosemide 20mg daily, isosorbide 60mg daily and sacubitril/valsartan (Entresto) 24-26mg twice daily, clopidogrel 75mg daily.  He also has SL nitroglycerin and uses PRN -- took one today while shoveling snow and this relieved chest pain. Patient reports the following medication side effects: occasionaly orthostatic lightheadedness. He is cautious when rising from a sitting/laying position.   ECHO:  Date 4/27/2020, EF 30-35%  Pt is not complaining of HF symptoms.     Pt is measuring daily weights and reports stable between 165-170lbs.   Patient does self-monitor blood pressure. Home BP monitoring in range of 120's systolic over 70's diastolic. HR in the 80's  Pt is following a low sodium diet, is avoiding EtOH.  Potassium   Date Value Ref Range Status   08/10/2020 4.8 3.4 - 5.3 mmol/L Final     GFR Estimate   Date Value Ref Range Status   08/10/2020 75 >60 mL/min/[1.73_m2] Final     Comment:     Non  GFR Calc  Starting 12/18/2018, serum creatinine based estimated GFR (eGFR) will be   calculated using the Chronic Kidney Disease Epidemiology Collaboration   (CKD-EPI) equation.         Afib:  Paroxysmal with life-long anticoag. Patient is currently taking Eliquis 5mg twice daily for anticoagulation. Patient reports no current concerns of bruising or bleeding. Patient does not have a history of GI bleed.   BP Readings from Last 3 Encounters:   08/10/20 106/63   02/05/20 134/70   06/17/19 126/59     Pulse Readings from Last 1 Encounters:   08/10/20 80       Hyperlipidemia: Current therapy includes atorvastatin 80mg daily.  Patient reports no significant myalgias or other side effects.  Lab Test 08/10/20  1242 02/05/20  1140   CHOL 95 126   HDL 31* 40   LDL 51 72   TRIG 67 71       COPD: Current medications:  Albuterol inhaler PRN (uses rarely; is effective when needed) and Incruse Ellipta 62.5mcg one puff daily.     Patient is not experiencing side effects.   Patient reports the following symptoms: none.     Insomnia: Current medications include: melatonin 3mg PRN and uses maybe 2x/week and zolpidem 5mg PRN, uses 3-4x/week and is effective when needed. He denies side effects. Requests refill.       Today's Vitals: There were no vitals taken for this visit.  ----------------      I spent 18 minutes with this patient today (an extra 15 minutes was spent creating the Medication Action Plan). All changes were made via collaborative practice agreement with Kyle López  [place]. A copy of the visit note was provided to the patient's primary care provider.    The patient was mailed a summary of these recommendations.     Renetta Cuab, Pharm.D., [facility]  Medication Therapy Management Pharmacist  [phone]    Telemedicine Visit Details  Type of service:  Telephone visit  Start Time: 1:30 PM  End Time: 1:48 PM  Originating Location (patient location): [place]  Distant Location (provider location):  [facility] [place]      Medication Therapy Recommendations  No medication therapy recommendations to display

## 2021-02-18 NOTE — LETTER
"        Date: 2021    Joseph Cardona  [address] [address]    Dear Mr. Cardona,    Thank you for talking with me on 2021 about your health and medications. Medicare s MTM (Medication Therapy Management) program helps you understand your medications and use them safely.      This letter includes an action plan (Medication Action Plan) and medication list (Personal Medication List). The action plan has steps you should take to help you get the best results from your medications. The medication list will help you keep track of your medications and how to use them the right way.       Have your action plan and medication list with you when you talk with your doctors, pharmacists, and other healthcare providers in your care team.     Ask your doctors, pharmacists, and other healthcare providers to update the action plan and medication list at every visit.     Take your medication list with you if you go to the hospital or emergency room.     Give a copy of the action plan and medication list to your family or caregivers.     If you want to talk about this letter or any of the papers with it, please call   [phone].We look forward to working with you, your doctors, and other healthcare providers to help you stay healthy through the [facility] MTM program.    Sincerely,  Renetta Cuba, PharmD, BCACP    Enclosed: Medication Action Plan and Personal Medication List    MEDICATION ACTION PLAN FOR Joseph Cardona,  1950     This action plan will help you get the best results from your medications if you:   1. Read \"What we talked about.\"   2. Take the steps listed in the \"What I need to do\" boxes.   3. Fill in \"What I did and when I did it.\"   4. Fill in \"My follow-up plan\" and \"Questions I want to ask.\"     Have this action plan with you when you talk with your doctors, pharmacists, and other healthcare providers in your care team. Share this with your " family or caregivers too.  DATE PREPARED: 2021  What we talked about: Medication Review                                                  What I need to do: Continue current medications without change       What I did and when I did it:                                              My follow-up plan:                 Questions I want to ask:              If you have any questions about your action plan, call Renetta Cuba, RyannD, BCACP, Phone: [phone] , Monday-Friday 8-4:30pm.           PERSONAL MEDICATION LIST FOR PEPE Robb 1950     This medication list was made for you after we talked. We also used information from your doctor's chart.      Use blank rows to add new medications. Then fill in the dates you started using them.    Cross out medications when you no longer use them. Then write the date and why you stopped using them.    Ask your doctors, pharmacists, and other healthcare providers to update this list at every visit. Keep this list up-to-date with:       Prescription medications    Over the counter drugs     Herbals    Vitamins    Minerals      If you go to the hospital or emergency room, take this list with you. Share this with your family or caregivers too.     DATE PREPARED: 2021  Allergies or side effects: Lisinopril     Medication:  ALBUTEROL SULFATE  (90 BASE) MCG/ACT IN AERS      How I use it:  INHALE 2 PUFFS INTO THE LUNGS EVERY 4 HOURS AS NEEDED FOR SHORTNESS OF BREATH / DYSPNEA OR WHEEZING      Why I use it: COPD, mild (H)    Prescriber:  Kyle López MD      Date I started using it:       Date I stopped using it:         Why I stopped using it:            Medication:  APIXABAN 5 MG PO TABS      How I use it:  Take 1 tablet (5 mg) by mouth 2 times daily      Why I use it: Paroxysmal atrial fibrillation (H); Peripheral vascular disease (H)    Prescriber:  Kyle López MD      Date I started using it:       Date I stopped using it:          Why I stopped using it:            Medication:  ASPIRIN NOT PRESCRIBED (INTENTIONAL)      How I use it:  Please choose reason not prescribed, below      Why I use it:      Prescriber:  Patient Reported      Date I started using it:       Date I stopped using it:         Why I stopped using it:            Medication:  ATORVASTATIN CALCIUM 80 MG PO TABS      How I use it:  TAKE 1 TABLET BY MOUTH EVERYDAY AT BEDTIME      Why I use it: Hyperlipidemia LDL goal <70    Prescriber:  Kyle López MD      Date I started using it:       Date I stopped using it:         Why I stopped using it:            Medication:  CARVEDILOL 6.25 MG PO TABS      How I use it:  Take 1 tablet (6.25 mg) by mouth 2 times daily (with meals)      Why I use it: Essential hypertension with goal blood pressure less than 140/90; Chronic ischemic heart disease    Prescriber:  Kyle López MD      Date I started using it:       Date I stopped using it:         Why I stopped using it:            Medication:  CLOPIDOGREL BISULFATE 75 MG PO TABS      How I use it:  Take 1 tablet (75 mg) by mouth daily      Why I use it: Chronic ischemic heart disease    Prescriber:  Kyle López MD      Date I started using it:       Date I stopped using it:         Why I stopped using it:            Medication:  ENTRESTO 24-26 MG PO TABS      How I use it:  TAKE 1 TABLET BY MOUTH 2 TIMES DAILY FOR HEART DISEASE (& BLOOD PRESSURE)      Why I use it: Ischemic cardiomyopathy; Chronic ischemic heart disease    Prescriber:  Kyle López MD      Date I started using it:       Date I stopped using it:         Why I stopped using it:            Medication:  FUROSEMIDE 20 MG PO TABS      How I use it:  Take 1 tablet (20 mg) by mouth daily      Why I use it: Essential hypertension with goal blood pressure less than 140/90    Prescriber:  Kyle López MD      Date I started using it:       Date I stopped using it:         Why I stopped  using it:            Medication:  INCRUSE ELLIPTA 62.5 MCG/INH IN AEPB      How I use it:  INHALE 1 PUFF BY MOUTH EVERY DAY      Why I use it: COPD, mild (H)    Prescriber:  Kyle López MD      Date I started using it:       Date I stopped using it:         Why I stopped using it:            Medication:  ISOSORBIDE MONONITRATE ER 60 MG PO TB24      How I use it:  Take 1 tablet (60 mg) by mouth every morning      Why I use it: Chronic ischemic heart disease    Prescriber:  Kyle López MD      Date I started using it:       Date I stopped using it:         Why I stopped using it:            Medication:  MELATONIN 3 MG PO CAPS      How I use it:  Take 3-6 mg by mouth every evening      Why I use it:  Insomnia    Prescriber:  Patient Reported      Date I started using it:       Date I stopped using it:         Why I stopped using it:            Medication:  NITROGLYCERIN 0.4 MG SL SUBL      How I use it:  PLACE 1 TABLET UNDER THE TONGUE EVERY 5 MINUTES AS NEEDED FOR CHEST PAIN.      Why I use it: Chronic ischemic heart disease    Prescriber:  Kyle López MD      Date I started using it:       Date I stopped using it:         Why I stopped using it:            Medication:  ZOLPIDEM TARTRATE 5 MG PO TABS      How I use it:  TAKE 1 TABLET BY MOUTH AS NEEDED FOR SLEEP      Why I use it: Persistent insomnia    Prescriber:  Kyle López MD      Date I started using it:       Date I stopped using it:         Why I stopped using it:            Medication:         How I use it:         Why I use it:      Prescriber:         Date I started using it:       Date I stopped using it:         Why I stopped using it:            Medication:         How I use it:         Why I use it:      Prescriber:         Date I started using it:       Date I stopped using it:         Why I stopped using it:            Medication:         How I use it:         Why I use it:      Prescriber:         Date I started  using it:       Date I stopped using it:         Why I stopped using it:              Other Information:     If you have any questions about your medication list, call Renetta Cuba PharmD, BCACP, Phone: [phone] , Monday-Friday 8-4:30pm.    According to the Paperwork Reduction Act of 1995, no persons are required to respond to a collection of information unless it displays a valid OMB control number. The valid OMB number for this information collection is 4271-0871. The time required to complete this information collection is estimated to average 40 minutes per response, including the time to review instructions, searching existing data resources, gather the data needed, and complete and review the information collection. If you have any comments concerning the accuracy of the time estimate(s) or suggestions for improving this form, please write to: CMS, Attn: CHANDAN Reports Clearance Officer, [address] [place] [id number].

## 2021-03-31 ENCOUNTER — TELEPHONE (OUTPATIENT)
Dept: FAMILY MEDICINE | Facility: CLINIC | Age: 71
End: 2021-03-31

## 2021-03-31 NOTE — TELEPHONE ENCOUNTER
Notify Medical Health Examiner that patient's face-to-face visit with this provider was August 10, 2020, therefore, in my opinion, I cannot sign Mr. Cardona's death certificate (if visit more than 6 months).

## 2021-03-31 NOTE — TELEPHONE ENCOUNTER
Reason for Call:  Other call back     Detailed comments: Joseph passed away last week at home, medical examiner is wondering when Dr. López last saw the patient and if he can sign the death certificate.    Phone Number Patient can be reached at: [phone]    Best Time: any    Can we leave a detailed message on this number? YES    Call taken on 3/31/2021 at 9:19 AM by Savanah Ford

## 2021-03-31 NOTE — TELEPHONE ENCOUNTER
Notification of a  patient is sent via email.  Osmani Nugent,  For 1st Floor Primary Care    Please call the medical examiner to notify them of Dr. López's note below.  Osmani Nugent,  For 1st Floor Primary Care

## 2021-04-01 ENCOUNTER — TELEPHONE (OUTPATIENT)
Dept: FAMILY MEDICINE | Facility: CLINIC | Age: 71
End: 2021-04-01

## 2021-04-01 NOTE — TELEPHONE ENCOUNTER
Reason for Call:  Other patient death    Detailed comments: [facility] [place] calling. Patient  last Thursday. They spoke with medial examiner [facility] and was told they were waiting for Dr López to call them back regarding who would sign death certificate. [phone] [facility] Medical Examiner. Please call them    Phone Number Patient can be reached at: Other phone number:  [phone]    Best Time:  any    Can we leave a detailed message on this number? YES    Call taken on 2021 at 9:47 AM by Gloria Lynn

## 2021-04-01 NOTE — TELEPHONE ENCOUNTER
Called and spoke to Wild at the  home and gave them Dr López's message. They state that we need to call the Medical Examiners office and let them know.  Called the Medical Examiners office and gave them Dr López's message and they are going to contact the Cardiologist.  Gloria Huber [facility]  2nd Floor  Primary Care

## 2021-04-01 NOTE — TELEPHONE ENCOUNTER
Called  home and reached the answering service. Will call back later.  Gloria Huber MA  [facility]  2nd Floor  Primary Care

## 2021-04-01 NOTE — TELEPHONE ENCOUNTER
Patient was seen by this provider more than six months ago, hence, I cannot sign the death certificate.    Please refer to telephone encounter yesterday.